# Patient Record
Sex: FEMALE | Race: WHITE | NOT HISPANIC OR LATINO | URBAN - METROPOLITAN AREA
[De-identification: names, ages, dates, MRNs, and addresses within clinical notes are randomized per-mention and may not be internally consistent; named-entity substitution may affect disease eponyms.]

---

## 2017-01-26 ENCOUNTER — PATIENT OUTREACH (OUTPATIENT)
Dept: ADMINISTRATIVE | Facility: HOSPITAL | Age: 62
End: 2017-01-26

## 2017-01-26 NOTE — LETTER
January 27, 2017    Celine Steen  [address] [place] [address]             [facility]  [address]  [facility] [address]  Phone: [phone] Dear Louie JiménezRector:    [facility] is committed to your overall health.  To help you get the most out of each of your visits, we will review your information to make sure you are up to date on all of your recommended tests and/or procedures.  We have Dr. Casa Lepe listed as your primary care provider.     He has found that you may be due for an office visit with him, some labs for your diabetes and cholesterol, your annual diabetic eye and foot exams, pap smear, mammogram, hepatitis C screening, and possibly some immunizations (flu, shingles, pneumonia, and tetanus).     If you have had any of the above done at an outside facility, please let us know so I can update your record.  If you have a copy of these records, please provide a copy for us to scan into your chart.  If not, please provide that provider/facilities contact information so that we may obtain copies from that facility.     Otherwise, please schedule these appointments at your earliest convenience.      If you have any questions or concerns, please don't hesitate to call.    Thank you for letting us care for you,  Keren Vega LPN Clinical Care Coordinator  [facility] [place]  [phone]

## 2017-01-26 NOTE — PROGRESS NOTES
Eye exam due report.    Due for an office visit with him, some labs for your diabetes and cholesterol, your annual diabetic eye and foot exams, pap smear, mammogram, hepatitis C screening, and possibly some immunizations (flu, shingles, pneumonia, and tetanus)    Last ov Roberth 5/2016    2nd attempt

## 2017-02-06 RX ORDER — ESCITALOPRAM OXALATE 20 MG/1
TABLET ORAL
Qty: 90 TABLET | Refills: 0 | Status: SHIPPED | OUTPATIENT
Start: 2017-02-06 | End: 2023-02-17 | Stop reason: SDUPTHER

## 2017-02-08 RX ORDER — METOPROLOL SUCCINATE 100 MG/1
TABLET, EXTENDED RELEASE ORAL
Qty: 90 TABLET | Refills: 0 | Status: SHIPPED | OUTPATIENT
Start: 2017-02-08 | End: 2023-02-17 | Stop reason: SDUPTHER

## 2017-03-02 ENCOUNTER — PATIENT OUTREACH (OUTPATIENT)
Dept: ADMINISTRATIVE | Facility: HOSPITAL | Age: 62
End: 2017-03-02

## 2017-03-02 NOTE — PROGRESS NOTES
Eye exam due report.    Due for an office visit with him, your fasting cholesterol and diabetes labs, your annual diabetic eye and foot exams, pap smear, mammogram, hepatitis C screening, and possibly some immunizations (flu, shingles, pneumonia, and tetanus)    Last ov Roberth 5/2016    Labs and ov scheduled - did you want to add anything?  There is a bnp and urine tox screen in there.  Do you want me to link them?

## 2017-03-08 ENCOUNTER — PATIENT OUTREACH (OUTPATIENT)
Dept: ADMINISTRATIVE | Facility: HOSPITAL | Age: 62
End: 2017-03-08

## 2017-03-08 NOTE — LETTER
March 8, 2017    Celine Steen  [address] [place] [address]             [facility]  [address]  [facility] [address]  Phone: [phone] Dear  Celsa:    [facility] committed to your overall health.  To help you get the most out of each of your visits, we will review your information to make sure you are up to date on all of your recommended tests and/or procedures.      Dr. Casa Lepe has found that you may be due for your annual diabetic eye and foot exams, pap smear, mammogram, and possibly some immunizations (flu, shingles, pneumonia, and tetanus).     If you have had any of the above done at another facility, please bring the records or information with you so that your record at [facility] will be complete.    If you are currently taking medication, please bring it with you to your appointment for review.    If you have any questions or concerns, please don't hesitate to call.    Thank you for letting us care for you,  Keren Vega LPN Clinical Care Coordinator  [facility] [place]  [phone]

## 2017-03-26 RX ORDER — CLOPIDOGREL BISULFATE 75 MG/1
TABLET ORAL
Qty: 90 TABLET | Refills: 0 | OUTPATIENT
Start: 2017-03-26

## 2017-05-24 RX ORDER — SIMVASTATIN 40 MG/1
TABLET, FILM COATED ORAL
Qty: 90 TABLET | Refills: 0 | OUTPATIENT
Start: 2017-05-24

## 2017-05-30 ENCOUNTER — PATIENT OUTREACH (OUTPATIENT)
Dept: ADMINISTRATIVE | Facility: HOSPITAL | Age: 62
End: 2017-05-30

## 2017-05-30 DIAGNOSIS — I10 ESSENTIAL HYPERTENSION: ICD-10-CM

## 2017-05-30 DIAGNOSIS — E11.00 TYPE 2 DIABETES MELLITUS WITH HYPEROSMOLARITY WITHOUT COMA, UNSPECIFIED LONG TERM INSULIN USE STATUS: Primary | ICD-10-CM

## 2017-05-30 DIAGNOSIS — Z11.59 ENCOUNTER FOR HEPATITIS C SCREENING TEST FOR LOW RISK PATIENT: ICD-10-CM

## 2017-05-30 NOTE — PROGRESS NOTES
DM registry, over 1 year.  Called pt to inform:    Due for an office visit with him, your fasting cholesterol and diabetes labs, a urine test for your kidneys, your annual diabetic eye and foot exams, mammogram, hepatitis C screening, and possibly some immunizations (shingles, pneumonia, and tetanus)    Last ov Roberth 5/2016, 5th attempt    Pt scheduled labs and ov.

## 2017-06-27 ENCOUNTER — PATIENT OUTREACH (OUTPATIENT)
Dept: ADMINISTRATIVE | Facility: HOSPITAL | Age: 62
End: 2017-06-27

## 2017-06-27 NOTE — LETTER
June 27, 2017    Celine Steen  [address] [place] [address]             [facility]  [address]  [facility] [address]  Phone: [phone] Dear Mrs. Jiménezhead:    We have tried to reach you unsuccessfully for the past several months.  We care about your health and want to ensure you are receiving the healthcare you need.  Please contact our office so that we can schedule any necessary appointments or testing that are due.    [facility] committed to your overall health.  To help you get the most out of each of your visits, we will review your information to make sure you are up to date on all of your recommended tests and/or procedures.      We have Dr. Casa Lepe listed as your primary care provider.  You have not been in to see him since May of 2016.  If Dr. Lepe is no longer your primary care provider, please contact me so that we may update our records accordingly.      He has found that you may be due for an office visit with him, your fasting cholesterol and diabetes labs, a urine test for your kidneys, your annual diabetic eye and foot exams, mammogram, and possibly some immunizations (shingles, pneumonia, and tetanus).     Medicare does not cover all immunizations to be given in the clinic.  Check your benefits to ensure that you do not need to receive your immunizations at the pharmacy.    If you have had any of the above done at an outside facility, please let us know so I can update your record.  If you have a copy of these records, please provide a copy for us to scan into your chart.  If not, please provide that provider/facilities contact information so that we may obtain copies from that facility.     Otherwise, please schedule these appointments at your earliest convenience.     If you have any questions or concerns, please don't hesitate to call.    Thank you for letting us care for you,  Keren Vega LPN Clinical Care Coordinator  [facility]  [place]  [phone]

## 2017-06-27 NOTE — PROGRESS NOTES
DM registry, over 1 year.  Called pt to inform:    Due for an office visit with him, your fasting cholesterol and diabetes labs, a urine test for your kidneys, your annual diabetic eye and foot exams, mammogram, and possibly some immunizations (shingles, pneumonia, and tetanus)    6th attempt, Amy's letter sent.

## 2018-04-12 ENCOUNTER — CLINICAL SUPPORT (OUTPATIENT)
Dept: SMOKING CESSATION | Facility: CLINIC | Age: 63
End: 2018-04-12
Payer: COMMERCIAL

## 2018-04-12 DIAGNOSIS — F17.200 NICOTINE DEPENDENCE: Primary | ICD-10-CM

## 2018-04-12 PROCEDURE — 99404 PREV MED CNSL INDIV APPRX 60: CPT | Mod: S$GLB,,,

## 2018-04-12 PROCEDURE — 99999 PR PBB SHADOW E&M-EST. PATIENT-LVL II: CPT | Mod: PBBFAC,,,

## 2018-04-12 RX ORDER — IBUPROFEN 200 MG
1 TABLET ORAL DAILY
Qty: 14 PATCH | Refills: 0 | Status: SHIPPED | OUTPATIENT
Start: 2018-04-12 | End: 2018-04-25 | Stop reason: SDUPTHER

## 2018-04-18 ENCOUNTER — CLINICAL SUPPORT (OUTPATIENT)
Dept: SMOKING CESSATION | Facility: CLINIC | Age: 63
End: 2018-04-18
Payer: COMMERCIAL

## 2018-04-18 DIAGNOSIS — F17.200 NICOTINE DEPENDENCE: Primary | ICD-10-CM

## 2018-04-18 PROCEDURE — 90853 GROUP PSYCHOTHERAPY: CPT | Mod: S$GLB,,,

## 2018-04-18 PROCEDURE — 99999 PR PBB SHADOW E&M-EST. PATIENT-LVL I: CPT | Mod: PBBFAC,,,

## 2018-04-18 NOTE — Clinical Note
Patient is smoking about 8-10 cigarettes, she has made some progress with reduction. The patient remains on the prescribed tobacco cessation medication regimen of 21 mg nicotine patch QD without any negative side effects at this time. The patient will continue to come to the clinic for additional support and encouragement.

## 2018-04-19 NOTE — PROGRESS NOTES
Site: [facility]  Date:  4/18/2018  Clinical Status of Patient: Outpatient   Length of Service and Code: 60 minutes - 30056   Number in Attendance: 5  Group Activities/Focus of Group:  orientation, client introductions, completion of TCRS (Tobacco Cessation Rating Scale) learned addiction model, cues/triggers, personal reasons for quitting, medications, goals, quit date    Target symptoms:  withdrawal and medication side effects             The following were rated moderate (3) to severe (4) on TCRS:       Moderate 3: Desire or crave, anxious - discussed withdrawal and habit      Severe 4:   none  Patient's Response to Intervention: Patient is smoking about 8-10 cigarettes, she has made some progress with reduction. The patient remains on the prescribed tobacco cessation medication regimen of 21 mg nicotine patch QD without any negative side effects at this time. The patient will continue to come to the clinic for additional support and encouragement.   Progress Toward Goals and Other Mental Status Changes: The patient denies any abnormal behavioral or mental changes at this time.   Diagnosis: Z72.0  Plan: The patient will continue with group therapy sessions and medication monitoring by CTTS. Prescribed medication management will be by physician.   Return to Clinic: 1 week

## 2018-04-25 ENCOUNTER — CLINICAL SUPPORT (OUTPATIENT)
Dept: SMOKING CESSATION | Facility: CLINIC | Age: 63
End: 2018-04-25
Payer: COMMERCIAL

## 2018-04-25 DIAGNOSIS — F17.200 NICOTINE DEPENDENCE: Primary | ICD-10-CM

## 2018-04-25 PROCEDURE — 99999 PR PBB SHADOW E&M-EST. PATIENT-LVL I: CPT | Mod: PBBFAC,,,

## 2018-04-25 PROCEDURE — 90853 GROUP PSYCHOTHERAPY: CPT | Mod: S$GLB,,,

## 2018-04-25 RX ORDER — IBUPROFEN 200 MG
1 TABLET ORAL DAILY
Qty: 14 PATCH | Refills: 0 | Status: SHIPPED | OUTPATIENT
Start: 2018-04-25 | End: 2018-05-10 | Stop reason: SDUPTHER

## 2018-04-25 NOTE — Clinical Note
Patient is smoking about 15-16 cigarettes per day. She states that she got some bad news about a friends health and she smoked more at that time. She is determined to get back on track and remain focused on her goal. She will be committed to consistent of the nicotine patch and she has set a reduction goal for this week. The patient remains on the prescribed tobacco cessation medication regimen of 21 mg nicotine patch QD without any negative side effects at this time. The patient will continue to come to the clinic for additional support and encouragement.

## 2018-04-26 NOTE — PROGRESS NOTES
Smoking Cessation Group Session #2    Site: [facility]  Date:  4/25/2018  Clinical Status of Patient: Outpatient   Length of Service and Code: 60 minutes - 27798   Number in Attendance: 4  Group Activities/Focus of Group:  Sharing last weeks challenges, triggers, and coping activities to remain quit and/ or keep making progress toward cessation, completion of TCRS (Tobacco Cessation Rating Scale) learned addiction model, personal reasons for quitting, medications, goals, quit date.    Specific session focus: completion of TCRS (Tobacco Cessation Rating Scale) reviewed strategies, cues, and triggers. Introduced the negative impact of tobacco on health, the health advantages of discontinuing the use of tobacco, time line improved health changes after a quit, withdrawal issues to expect from nicotine and habit, and ways to achieve the goal of a quit.  Target symptoms:  withdrawal and medication side effects             The following were rated moderate (3) to severe (4) on TCRS:       Moderate 3: desire or crave - discussed withdrawal and habit      Severe 4:   None   Patient's Response to Intervention: Patient is smoking about 15-16 cigarettes per day. She states that she got some bad news about a friends health and she smoked more at that time. She is determined to get back on track and remain focused on her goal. She will be committed to consistent of the nicotine patch and she has set a reduction goal for this week. The patient remains on the prescribed tobacco cessation medication regimen of 21 mg nicotine patch QD without any negative side effects at this time. The patient will continue to come to the clinic for additional support and encouragement.   Progress Toward Goals and Other Mental Status Changes: The patient denies any abnormal behavioral or mental changes at this time.   Diagnosis: Z72.0  Plan: The patient will continue with group therapy sessions and medication monitoring by CTTS. Prescribed medication  management will be by physician.   Return to Clinic: 1 week    Quit Date:    Planned Quit Date:

## 2018-05-03 ENCOUNTER — CLINICAL SUPPORT (OUTPATIENT)
Dept: SMOKING CESSATION | Facility: CLINIC | Age: 63
End: 2018-05-03
Payer: COMMERCIAL

## 2018-05-03 DIAGNOSIS — F17.200 NICOTINE DEPENDENCE: Primary | ICD-10-CM

## 2018-05-03 PROCEDURE — 99999 PR PBB SHADOW E&M-EST. PATIENT-LVL I: CPT | Mod: PBBFAC,,,

## 2018-05-03 PROCEDURE — 99407 BEHAV CHNG SMOKING > 10 MIN: CPT | Mod: S$GLB,,,

## 2018-05-09 ENCOUNTER — CLINICAL SUPPORT (OUTPATIENT)
Dept: SMOKING CESSATION | Facility: CLINIC | Age: 63
End: 2018-05-09
Payer: COMMERCIAL

## 2018-05-09 DIAGNOSIS — F17.200 NICOTINE DEPENDENCE: Primary | ICD-10-CM

## 2018-05-09 PROCEDURE — 90853 GROUP PSYCHOTHERAPY: CPT | Mod: S$GLB,,,

## 2018-05-09 PROCEDURE — 99999 PR PBB SHADOW E&M-EST. PATIENT-LVL I: CPT | Mod: PBBFAC,,,

## 2018-05-09 NOTE — Clinical Note
Patient is smoking about 9 cigarettes per day. She states that she got down to 5 per day and with stress she slipped back to 9. The patient remains on the prescribed tobacco cessation medication regimen of 21 mg nicotine patch QD without any negative side effects at this time. The patient will continue to come to the clinic for additional support and encouragement.

## 2018-05-10 RX ORDER — IBUPROFEN 200 MG
1 TABLET ORAL DAILY
Qty: 14 PATCH | Refills: 0 | Status: SHIPPED | OUTPATIENT
Start: 2018-05-10 | End: 2018-05-24 | Stop reason: SDUPTHER

## 2018-05-10 NOTE — PROGRESS NOTES
Smoking Cessation Group Session #4    Site: [facility]  Date:  5/9/2018  Clinical Status of Patient: Outpatient   Length of Service and Code: 60 minutes - 01994   Number in Attendance: 3  Group Activities/Focus of Group:  Sharing last weeks challenges, triggers, and coping activities to remain quit and/ or keep making progress toward cessation, completion of TCRS (Tobacco Cessation Rating Scale) learned addiction model, personal reasons for quitting, medications, goals, quit date.    Specific session focus: completion of TCRS (Tobacco Cessation Rating Scale) reviewed strategies, habitual behavior, stress, and high risk situations. Introduced stress with addition interventions, SOLVE, relaxation with interventions, nutrition, exercise, weight gain, and the importance of rewarding oneself for accomplishments toward becoming tobacco free. Open discussion of all items with interventions.     Target symptoms:  withdrawal and medication side effects             The following were rated moderate (3) to severe (4) on TCRS:       Moderate 3: irritable, desire or crave, anxious, restless - discussed withdrawal and habit      Severe 4:   None   Patient's Response to Intervention: Patient is smoking about 9 cigarettes per day. She states that she got down to 5 per day and with stress she slipped back to 9. The patient remains on the prescribed tobacco cessation medication regimen of 21 mg nicotine patch QD without any negative side effects at this time. The patient will continue to come to the clinic for additional support and encouragement.   Progress Toward Goals and Other Mental Status Changes: The patient denies any abnormal behavioral or mental changes at this time.   Diagnosis: Z72.0  Plan:The patient will continue with group therapy sessions and medication monitoring by CTTS. Prescribed medication management will be by physician.   Return to Clinic: 1 week    Quit Date:    Planned Quit Date: discussed but not yet set

## 2018-05-23 ENCOUNTER — CLINICAL SUPPORT (OUTPATIENT)
Dept: SMOKING CESSATION | Facility: CLINIC | Age: 63
End: 2018-05-23
Payer: COMMERCIAL

## 2018-05-23 DIAGNOSIS — F17.200 NICOTINE DEPENDENCE: Primary | ICD-10-CM

## 2018-05-23 PROCEDURE — 99999 PR PBB SHADOW E&M-EST. PATIENT-LVL I: CPT | Mod: PBBFAC,,,

## 2018-05-23 PROCEDURE — 90853 GROUP PSYCHOTHERAPY: CPT | Mod: S$GLB,,,

## 2018-05-24 RX ORDER — VARENICLINE TARTRATE 0.5 (11)-1
KIT ORAL
Qty: 1 PACKAGE | Refills: 0 | Status: SHIPPED | OUTPATIENT
Start: 2018-05-24 | End: 2018-06-21 | Stop reason: DRUGHIGH

## 2018-05-24 RX ORDER — IBUPROFEN 200 MG
1 TABLET ORAL DAILY
Qty: 14 PATCH | Refills: 0 | Status: SHIPPED | OUTPATIENT
Start: 2018-05-24 | End: 2018-06-07 | Stop reason: SDUPTHER

## 2018-05-24 NOTE — PROGRESS NOTES
Smoking Cessation Group Session #6    Site: [facility]  Date:  5/23/2018  Clinical Status of Patient: Outpatient   Length of Service and Code: 60 minutes - 08063   Number in Attendance: 3  Group Activities/Focus of Group:  Sharing last weeks challenges, triggers, and coping activities to remain quit and/ or keep making progress toward cessation, completion of TCRS (Tobacco Cessation Rating Scale) learned addiction model, personal reasons for quitting, medications, goals, quit date.    Specific session focus: completion of TCRS (Tobacco Cessation Rating Scale) reviewed strategies, cues, triggers, high risk situations, lapses, relapses, diet, exercise, stress, relaxation, sleep, habitual behavior, and life style changes.  Target symptoms:  withdrawal and medication side effects             The following were rated moderate (3) to severe (4) on TCRS:       Moderate 3: irritable, desire or crave, depressed mood, anxious, restless, difficulty concentrating - discussed withdrawal and habit      Severe 4:   none  Patient's Response to Intervention: Patient states that she had a bad week because she had to go see her brother in [place] that is dying with cancer. She is back to smoking 20 cigarettes per day. She took the nicotine patch off  for a few days. She states that this has been a real struggle for her and that she will try to refocus on this quit effort. She has requested Rx for Chantix to further aid her quit. We discussed the importance of a recommit to the quit and the importance of a daily consistent effort despite the more difficult days. The patient remains on the prescribed tobacco cessation medication regimen of 21 mg nicotine patch QD without any negative side effects at this time. I will look into coverage for Chantix. The patient will continue to come to the clinic for additional support and encouragement.      Progress Toward Goals and Other Mental Status Changes: The patient denies any abnormal behavioral or  mental changes at this time.   Diagnosis: Z72.0  Plan: The patient will continue with group therapy sessions and medication monitoring by CTTS. Prescribed medication management will be by physician.   Return to Clinic: 1 week    Quit Date:    Planned Quit Date: discussed but not yet set

## 2018-05-30 ENCOUNTER — CLINICAL SUPPORT (OUTPATIENT)
Dept: SMOKING CESSATION | Facility: CLINIC | Age: 63
End: 2018-05-30
Payer: COMMERCIAL

## 2018-05-30 DIAGNOSIS — F17.200 NICOTINE DEPENDENCE: Primary | ICD-10-CM

## 2018-05-30 PROCEDURE — 90853 GROUP PSYCHOTHERAPY: CPT | Mod: S$GLB,,,

## 2018-05-30 PROCEDURE — 99999 PR PBB SHADOW E&M-EST. PATIENT-LVL I: CPT | Mod: PBBFAC,,,

## 2018-05-30 NOTE — Clinical Note
Patient states that she is doing better this week. She is smoking a few puffs at a time on 1-2 cigarette throughout the day. She started Chantix starter pack and she is tolerating well thus far without any negative side effects. She has continued use of the 21 mg nicotine patch QD without any negative side effects at this time. I have recommended she finish the patches that she has and then plan to stop the nicotine patch. She will try a dry run quit day this week on 6/1/2018. The patient will continue to come to the clinic for additional support and encouragement.

## 2018-05-31 NOTE — PROGRESS NOTES
Site: [facility]  Date:  5/30/2018  Clinical Status of Patient: Outpatient   Length of Service and Code: 60 minutes - 11680   Number in Attendance: 4  Group Activities/Focus of Group:  orientation, client introductions, completion of TCRS (Tobacco Cessation Rating Scale) learned addiction model, cues/triggers, personal reasons for quitting, medications, goals, quit date    Target symptoms:  withdrawal and medication side effects             The following were rated moderate (3) to severe (4) on TCRS:       Moderate 3: increased appetite, anxious - discussed withdrawal and habit       Severe 4:   none  Patient's Response to Intervention: Patient states that she is doing better this week. She is smoking a few puffs at a time on 1-2 cigarette throughout the day. She started Chantix starter pack and she is tolerating well thus far without any negative side effects. She has continued use of the 21 mg nicotine patch QD without any negative side effects at this time. I have recommended she finish the patches that she has and then plan to stop the nicotine patch. She will try a dry run quit day this week on 6/1/2018. The patient will continue to come to the clinic for additional support and encouragement.     Progress Toward Goals and Other Mental Status Changes: The patient denies any abnormal behavioral or mental changes at this time.   Diagnosis: Z72.0  Plan: The patient will continue with group therapy sessions and medication monitoring by CTTS. Prescribed medication management will be by physician.     Return to Clinic: 1 week

## 2018-06-06 ENCOUNTER — CLINICAL SUPPORT (OUTPATIENT)
Dept: SMOKING CESSATION | Facility: CLINIC | Age: 63
End: 2018-06-06
Payer: COMMERCIAL

## 2018-06-06 DIAGNOSIS — F17.200 NICOTINE DEPENDENCE: Primary | ICD-10-CM

## 2018-06-06 PROCEDURE — 99999 PR PBB SHADOW E&M-EST. PATIENT-LVL I: CPT | Mod: PBBFAC,,,

## 2018-06-06 PROCEDURE — 90853 GROUP PSYCHOTHERAPY: CPT | Mod: S$GLB,,,

## 2018-06-06 NOTE — Clinical Note
Patient reports that she is tobacco free since 5/31/2018. She states that she's surprised herself, and she is doing well at this time. Patient is using strategies previously discussed. The patient remains on the prescribed tobacco cessation medication regimen of 1 mg Chantix BID & 21 mg nicotine patch QD, without any negative side effects at this time. We discussed discontinuing the use of patch and allowing Chantix to continue working at it's therapeutic level. She would like to stay on patches 21 mg QD for 2 more weeks. The patient will continue to come to the clinic for additional support and encouragement.

## 2018-06-07 RX ORDER — IBUPROFEN 200 MG
1 TABLET ORAL DAILY
Qty: 14 PATCH | Refills: 0 | Status: SHIPPED | OUTPATIENT
Start: 2018-06-07 | End: 2018-07-12 | Stop reason: DRUGHIGH

## 2018-06-07 NOTE — PROGRESS NOTES
Smoking Cessation Group Session #2    Site: [facility]  Date:  6/6/2018  Clinical Status of Patient: Outpatient   Length of Service and Code: 60 minutes - 38884   Number in Attendance: 3  Group Activities/Focus of Group:  Sharing last weeks challenges, triggers, and coping activities to remain quit and/ or keep making progress toward cessation, completion of TCRS (Tobacco Cessation Rating Scale) learned addiction model, personal reasons for quitting, medications, goals, quit date.    Specific session focus: completion of TCRS (Tobacco Cessation Rating Scale) reviewed strategies, cues, and triggers. Introduced the negative impact of tobacco on health, the health advantages of discontinuing the use of tobacco, time line improved health changes after a quit, withdrawal issues to expect from nicotine and habit, and ways to achieve the goal of a quit.  Target symptoms:  withdrawal and medication side effects             The following were rated moderate (3) to severe (4) on TCRS:       Moderate 3: depressed mood -discussed habit & withdrawal      Severe 4:   Increased appetite - discussed habit & withdrawal   Patient's Response to Intervention: Patient reports that she is tobacco free since 5/31/2018. She states that she's surprised herself, and she is doing well at this time. Patient is using strategies previously discussed. The patient remains on the prescribed tobacco cessation medication regimen of 1 mg Chantix BID & 21 mg nicotine patch QD, without any negative side effects at this time. We discussed discontinuing the use of patch and allowing Chantix to continue working at it's therapeutic level. She would like to stay on patches 21 mg QD for 2 more weeks. The patient will continue to come to the clinic for additional support and encouragement.     Progress Toward Goals and Other Mental Status Changes: The patient denies any abnormal behavioral or mental changes at this time.   Diagnosis: Z72.0  Plan: The patient will  continue with group therapy sessions and medication monitoring by CTTS. Prescribed medication management will be by physician.   Return to Clinic: 1 week    Quit Date:  5/31/2018  Planned Quit Date:

## 2018-06-13 ENCOUNTER — CLINICAL SUPPORT (OUTPATIENT)
Dept: SMOKING CESSATION | Facility: CLINIC | Age: 63
End: 2018-06-13
Payer: COMMERCIAL

## 2018-06-13 DIAGNOSIS — F17.200 NICOTINE DEPENDENCE: Primary | ICD-10-CM

## 2018-06-13 PROCEDURE — 99999 PR PBB SHADOW E&M-EST. PATIENT-LVL I: CPT | Mod: PBBFAC,,,

## 2018-06-13 PROCEDURE — 90853 GROUP PSYCHOTHERAPY: CPT | Mod: S$GLB,,,

## 2018-06-13 NOTE — Clinical Note
Patient remains tobacco free at this time. She is doing well and she states that her urges and desire have become less intense and less frequent. Patient is using strategies previously discussed. The patient remains on the prescribed tobacco cessation medication regimen of 1 mg Chantix BID & 21 mg nicotine patch QD, without any negative side effects at this time. I have encouraged patient to discontinue use of the nicotine patch, but she feels secure with the patch for now and she is tolerating well without negative side effects at this time.

## 2018-06-14 NOTE — PROGRESS NOTES
Smoking Cessation Group Session #3    Site: [facility]  Date:  6/13/2018  Clinical Status of Patient: Outpatient   Length of Service and Code: 60 minutes - 43757   Number in Attendance: 6  Group Activities/Focus of Group:  Sharing last weeks challenges, triggers, and coping activities to remain quit and/ or keep making progress toward cessation, completion of TCRS (Tobacco Cessation Rating Scale) learned addiction model, personal reasons for quitting, medications, goals, quit date.    Specific session focus: completion of TCRS (Tobacco Cessation Rating Scale) reviewed strategies, controlling environment, cues, triggers, new goals set. Introduced high risk situations with preparation interventions, caffeine similarities with withdrawal issues of habit and nicotine, Alcohol, Understanding urges, cravings, stress and relaxation. Open discussion with intervention discussion.  Target symptoms:  withdrawal and medication side effects             The following were rated moderate (3) to severe (4) on TCRS:       Moderate 3: increased appetite - discussed withdrawal & habit       Severe 4:   none  Patient's Response to Intervention: Patient remains tobacco free at this time. She is doing well and she states that her urges and desire have become less intense and less frequent. Patient is using strategies previously discussed. The patient remains on the prescribed tobacco cessation medication regimen of 1 mg Chantix BID & 21 mg nicotine patch QD, without any negative side effects at this time. I have encouraged patient to discontinue use of the nicotine patch, but she feels secure with the patch for now and she is tolerating well without negative side effects at this time.   Progress Toward Goals and Other Mental Status Changes: The patient denies any abnormal behavioral or mental changes at this time.     Diagnosis: Z72.0  Plan: The patient will continue with group therapy sessions and medication monitoring by CTTS. Prescribed  medication management will be by physician.   Return to Clinic: 1 week    Quit Date:  5/31/2018  Planned Quit Date:

## 2018-06-18 ENCOUNTER — TELEPHONE (OUTPATIENT)
Dept: SMOKING CESSATION | Facility: CLINIC | Age: 63
End: 2018-06-18

## 2018-06-18 ENCOUNTER — CLINICAL SUPPORT (OUTPATIENT)
Dept: SMOKING CESSATION | Facility: CLINIC | Age: 63
End: 2018-06-18
Payer: COMMERCIAL

## 2018-06-18 DIAGNOSIS — F17.200 NICOTINE DEPENDENCE: Primary | ICD-10-CM

## 2018-06-18 PROCEDURE — 99407 BEHAV CHNG SMOKING > 10 MIN: CPT | Mod: S$GLB,,,

## 2018-06-18 NOTE — PROGRESS NOTES
Successful contact with patient regarding tobacco cessation quit #1. Pt states, she remains tobacco free since 5/31/2018; down from 1-2 packs/day. Pt commended for the accomplishment, thus far. Pt also states, strategies implemented in group therapy, along with prescribed chantix helped her to maintain being tobacco free. Pt denies any abnormal behavior or mental changes at this time. Pt provided with her current and next available benefit status.  Will follow up with her progress in 3-4 months.

## 2018-06-20 ENCOUNTER — CLINICAL SUPPORT (OUTPATIENT)
Dept: SMOKING CESSATION | Facility: CLINIC | Age: 63
End: 2018-06-20
Payer: COMMERCIAL

## 2018-06-20 DIAGNOSIS — F17.200 NICOTINE DEPENDENCE: Primary | ICD-10-CM

## 2018-06-20 PROCEDURE — 90853 GROUP PSYCHOTHERAPY: CPT | Mod: S$GLB,,,

## 2018-06-20 PROCEDURE — 99999 PR PBB SHADOW E&M-EST. PATIENT-LVL I: CPT | Mod: PBBFAC,,,

## 2018-06-20 NOTE — Clinical Note
Patient is doing well at this time and she is managing withdrawal symptoms well. She remains tobacco free. The patient remains on the prescribed tobacco cessation medication regimen of 1 mg Chantix BID & 21 mg nicotine patch, without any negative side effects at this time. Discussed discontinue of 21 mg patch or weaning to 14 mg QD. The patient will continue to come to the clinic for additional support and encouragement.

## 2018-06-21 RX ORDER — VARENICLINE TARTRATE 1 MG/1
1 TABLET, FILM COATED ORAL 2 TIMES DAILY
Qty: 60 TABLET | Refills: 0 | Status: SHIPPED | OUTPATIENT
Start: 2018-06-21 | End: 2022-11-08

## 2018-06-21 NOTE — PROGRESS NOTES
Smoking Cessation Group Session #4    Site: [facility]  Date:  6/20/2018  Clinical Status of Patient: Outpatient   Length of Service and Code: 60 minutes - 09061   Number in Attendance: 3  Group Activities/Focus of Group:  Sharing last weeks challenges, triggers, and coping activities to remain quit and/ or keep making progress toward cessation, completion of TCRS (Tobacco Cessation Rating Scale) learned addiction model, personal reasons for quitting, medications, goals, quit date.    Specific session focus: completion of TCRS (Tobacco Cessation Rating Scale) reviewed strategies, habitual behavior, stress, and high risk situations. Introduced stress with addition interventions, SOLVE, relaxation with interventions, nutrition, exercise, weight gain, and the importance of rewarding oneself for accomplishments toward becoming tobacco free. Open discussion of all items with interventions.   Target symptoms:  withdrawal and medication side effects             The following were rated moderate (3) to severe (4) on TCRS:       Moderate 3: none     Severe 4:   none  Patient's Response to Intervention: Patient is doing well at this time and she is managing withdrawal symptoms well. She remains tobacco free. The patient remains on the prescribed tobacco cessation medication regimen of 1 mg Chantix BID & 21 mg nicotine patch, without any negative side effects at this time. The patient will continue to come to the clinic for additional support and encouragement.     Progress Toward Goals and Other Mental Status Changes: The patient denies any abnormal behavioral or mental changes at this time.     Diagnosis: Z72.0  Plan:The patient will continue with group therapy sessions and medication monitoring by CTTS. Prescribed medication management will be by physician.   Return to Clinic: 1 week    Quit Date:  5/31/2018  Planned Quit Date:

## 2018-07-11 ENCOUNTER — CLINICAL SUPPORT (OUTPATIENT)
Dept: SMOKING CESSATION | Facility: CLINIC | Age: 63
End: 2018-07-11
Payer: COMMERCIAL

## 2018-07-11 DIAGNOSIS — F17.200 NICOTINE DEPENDENCE: Primary | ICD-10-CM

## 2018-07-11 PROCEDURE — 90853 GROUP PSYCHOTHERAPY: CPT | Mod: S$GLB,,,

## 2018-07-11 PROCEDURE — 99999 PR PBB SHADOW E&M-EST. PATIENT-LVL I: CPT | Mod: PBBFAC,,,

## 2018-07-12 RX ORDER — IBUPROFEN 200 MG
1 TABLET ORAL DAILY
Qty: 14 PATCH | Refills: 0 | Status: SHIPPED | OUTPATIENT
Start: 2018-07-12 | End: 2022-11-08

## 2018-07-12 NOTE — PROGRESS NOTES
Smoking Cessation Group Session #5    Site: [facility]  Date:  7/11/2018  Clinical Status of Patient: Outpatient   Length of Service and Code: 60 minutes - 34932   Number in Attendance: 3  Group Activities/Focus of Group:  Sharing last weeks challenges, triggers, and coping activities to remain quit and/ or keep making progress toward cessation, completion of TCRS (Tobacco Cessation Rating Scale) learned addiction model, personal reasons for quitting, medications, goals, quit date.    Specific session focus: completion of TCRS (Tobacco Cessation Rating Scale) reviewed strategies, habitual behavior, high risks situations, understanding urges and cravings, stress and relaxation with open discussion and additional interventions, Introduced lapses, relapses, understanding them and analyzing the situation of a lapse, conflict issues that may be linked to a lapse.   Target symptoms:  withdrawal and medication side effects             The following were rated moderate (3) to severe (4) on TCRS:       Moderate 3: Increased appetite     Severe 4:   none  Patient's Response to Intervention: Patient remains tobacco free at this time. Patient is using strategies previously discussed.  The patient remains on the prescribed tobacco cessation medication regimen of 1 mg Chantix BID without any negative side effects at this time.    She dis try not using the nicotine patch for a few days as I had recommended and she believes that the patch does aid in keeping urges to minimum. I will wean the dose to 14 mg and encouraged her to wean off completely. The patient will continue to come to the clinic for additional support and encouragement.     Progress Toward Goals and Other Mental Status Changes: The patient denies any abnormal behavioral or mental changes at this time.   Diagnosis: Z72.0  Plan: The patient will continue with group therapy sessions and medication monitoring by CTTS. Prescribed medication management will be by physician.    Return to Clinic: 1 week    Quit Date: 5/31/2018   Planned Quit Date:

## 2018-10-25 ENCOUNTER — TELEPHONE (OUTPATIENT)
Dept: SMOKING CESSATION | Facility: CLINIC | Age: 63
End: 2018-10-25

## 2018-11-02 ENCOUNTER — TELEPHONE (OUTPATIENT)
Dept: SMOKING CESSATION | Facility: CLINIC | Age: 63
End: 2018-11-02

## 2018-11-15 ENCOUNTER — TELEPHONE (OUTPATIENT)
Dept: SMOKING CESSATION | Facility: CLINIC | Age: 63
End: 2018-11-15

## 2019-05-30 ENCOUNTER — CLINICAL SUPPORT (OUTPATIENT)
Dept: URGENT CARE | Facility: CLINIC | Age: 64
End: 2019-05-30
Payer: MEDICARE

## 2019-05-30 VITALS
WEIGHT: 191 LBS | RESPIRATION RATE: 18 BRPM | BODY MASS INDEX: 33.84 KG/M2 | HEIGHT: 63 IN | SYSTOLIC BLOOD PRESSURE: 165 MMHG | OXYGEN SATURATION: 95 % | TEMPERATURE: 99 F | DIASTOLIC BLOOD PRESSURE: 87 MMHG | HEART RATE: 89 BPM

## 2019-05-30 DIAGNOSIS — J06.9 UPPER RESPIRATORY TRACT INFECTION, UNSPECIFIED TYPE: ICD-10-CM

## 2019-05-30 DIAGNOSIS — J04.0 LARYNGITIS: ICD-10-CM

## 2019-05-30 DIAGNOSIS — J02.9 PHARYNGITIS, UNSPECIFIED ETIOLOGY: Primary | ICD-10-CM

## 2019-05-30 DIAGNOSIS — E11.9 TYPE 2 DIABETES MELLITUS WITHOUT COMPLICATION, UNSPECIFIED WHETHER LONG TERM INSULIN USE: ICD-10-CM

## 2019-05-30 LAB — GLUCOSE SERPL-MCNC: 194 MG/DL (ref 70–110)

## 2019-05-30 PROCEDURE — 99214 PR OFFICE/OUTPT VISIT, EST, LEVL IV, 30-39 MIN: ICD-10-PCS | Mod: 25,S$GLB,, | Performed by: NURSE PRACTITIONER

## 2019-05-30 PROCEDURE — 99214 OFFICE O/P EST MOD 30 MIN: CPT | Mod: 25,S$GLB,, | Performed by: NURSE PRACTITIONER

## 2019-05-30 PROCEDURE — 82962 POCT GLUCOSE, HAND-HELD DEVICE: ICD-10-PCS | Mod: ,,, | Performed by: NURSE PRACTITIONER

## 2019-05-30 PROCEDURE — 82962 GLUCOSE BLOOD TEST: CPT | Mod: ,,, | Performed by: NURSE PRACTITIONER

## 2019-05-30 RX ORDER — PREDNISONE 20 MG/1
20 TABLET ORAL 2 TIMES DAILY
Qty: 6 TABLET | Refills: 0 | Status: SHIPPED | OUTPATIENT
Start: 2019-05-30 | End: 2019-06-02

## 2019-05-30 RX ORDER — FLUTICASONE PROPIONATE 50 MCG
2 SPRAY, SUSPENSION (ML) NASAL 2 TIMES DAILY
Qty: 1 BOTTLE | Refills: 0 | Status: SHIPPED | OUTPATIENT
Start: 2019-05-30 | End: 2022-02-13

## 2019-05-30 RX ORDER — CODEINE PHOSPHATE AND GUAIFENESIN 10; 100 MG/5ML; MG/5ML
5 SOLUTION ORAL 3 TIMES DAILY PRN
Qty: 120 ML | Refills: 0 | Status: SHIPPED | OUTPATIENT
Start: 2019-05-30 | End: 2019-06-09

## 2019-05-30 RX ORDER — CETIRIZINE HYDROCHLORIDE 10 MG/1
10 TABLET ORAL DAILY
Qty: 30 TABLET | Refills: 2 | Status: ON HOLD | OUTPATIENT
Start: 2019-05-30 | End: 2023-04-08 | Stop reason: HOSPADM

## 2019-05-30 NOTE — PATIENT INSTRUCTIONS
Symptomatic treatment:    Alternate Tylenol and Ibuprofen every 3 hrs for fever, pain and inflammation. Avoid Nsaids if you are pregnant or have advanced kidney disease.     salt water gargles to soothe throat from post nasal drip  Honey/lemon water or warm tea to soothe throat from post nasal drip  Cepachol helps to soothe the discomfort in throat from post nasal drip    Cold-eeze helps to reduce the duration of URI symptoms if taken early  Elderberry to reduce duration of viral URI symptoms    Nasal saline spray reduces inflammation and dryness  Warm face compresses/hot showers as often as you can to open sinuses and allow to drain.   Flonase OTC or Nasacort OTC to help decrease inflammation in nasal turbinates and allow sinuses to drain    Vicks vapor rub at night  Simple foods like chicken noodle soup help provide hydration and nutrition    Delsym helps with coughing at night    Zantac will help if there is reflux from the post nasal drip and helpful to take at night    Zyrtec/Claritin during the day and Benadryl at night may help if allergy component concurrently with URI    Rest as much as you can    Your symptoms will likely last 5-7 days, maybe longer depending on how it affects your body.  You are contagious 5-7, so minimize contact with others to reduce the spread to others and stay home from work or school as we discussed. Dehydration is preventable but is one of the main reasons why you will feel so badly. Drink pedialyte, gatorade or propel. Stay hydrated.  Antibiotics are not needed unless a complication(such as Otitis Media, Bacterial sinus infection or pneumonia) develops. Taking antibiotics for Flu/Cold is not supported by evidence-based medicine and can expose you to unnecessary side effects of the medication, such as anaphylaxis, yeast infection and leads to antibiotic resistance.   If you experience any:  Chest pain, shortness of breath, wheezing or difficulty breathing,  Severe headache, face,  neck or ear pain,  New rash,  Fever over 101.5º F (38.6 C) for more than three days,  Confusion, behavior change or seizure,  Severe weakness or dizziness, please go to the ER immediately for further testing.             Laryngitis    Laryngitis is a swelling of the tissues around the vocal cords. Symptoms include a hoarse (scratchy) voice. The voice may be lost completely. It may be caused by a viral illness, such as a head or chest cold. It may also be due to overuse and strain of the voice. Smoking, drinking alcohol, acid reflux, allergies, or inhaling harsh chemicals may also lead to symptoms. This condition will usually resolve in 1-2 weeks.  Home care  · Rest your voice until it recovers. Talk as little as possible. If your symptoms are severe, rest at home for a day or so.  · Breathing cool steam from a humidifier/vaporizer or in a steamy shower may be helpful.  · Drink plenty of fluids to stay well hydrated.  · Do not smoke  Follow-up care  Follow up with your healthcare provider or this facility if you have not improved after one week.  When to seek medical advice  Contact your healthcare provider for any of the following:  · Severe pain with swallowing  · Trouble opening mouth  · Neck swelling, neck pain, or trouble moving neck  · Noisy breathing or trouble breathing  · Fever of 100.4°F (38.ºC) or higher, or as directed by your healthcare provider  · Drooling  · Symptoms do not resolve in 2 weeks  Date Last Reviewed: 4/26/2015  © 4077-2249 [employer]. [address] [place] [address], [place] [address]. All rights reserved. This information is not intended as a substitute for professional medical care. Always follow your healthcare professional's instructions.        Self-Care for Sore Throats    Sore throats happen for many reasons, such as colds, allergies, and infections caused by viruses or bacteria. In any case, your throat becomes red and sore. Your goal for self-care is to reduce your  discomfort while giving your throat a chance to heal.  Moisten and soothe your throat  Tips include the following:  · Try a sip of water first thing after waking up.  · Keep your throat moist by drinking 6 or more glasses of clear liquids every day.  · Run a cool-air humidifier in your room overnight.  · Avoid cigarette smoke.   · Suck on throat lozenges, cough drops, hard candy, ice chips, or frozen fruit-juice bars. Use the sugar-free versions if your diet or medical condition requires them.  Gargle to ease irritation  Gargling every hour or 2 can ease irritation. Try gargling with 1 of these solutions:  · 1/4 teaspoon of salt in 1/2 cup of warm water  · An over-the-counter anesthetic gargle  Use medicine for more relief  Over-the-counter medicine can reduce sore throat symptoms. Ask your pharmacist if you have questions about which medicine to use:  · Ease pain with anesthetic sprays. Aspirin or an aspirin substitute also helps. Remember, never give aspirin to anyone 18 or younger, or if you are already taking blood thinners.   · For sore throats caused by allergies, try antihistamines to block the allergic reaction.  · Remember: unless a sore throat is caused by a bacterial infection, antibiotics wont help you.  Prevent future sore throats  Prevention tips include the following:  · Stop smoking or reduce contact with secondhand smoke. Smoke irritates the tender throat lining.  · Limit contact with pets and with allergy-causing substances, such as pollen and mold.  · When youre around someone with a sore throat or cold, wash your hands often to keep viruses or bacteria from spreading.  · Dont strain your vocal cords.  Call your healthcare provider  Contact your healthcare provider if you have:  · A temperature over 101°F (38.3°C)  · White spots on the throat  · Great difficulty swallowing  · Trouble breathing  · A skin rash  · Recent exposure to someone else with strep bacteria  · Severe hoarseness and swollen  glands in the neck or jaw   Date Last Reviewed: 8/1/2016  © 8904-8252 [employer]. [address] [place] [address], [place] [address]. All rights reserved. This information is not intended as a substitute for professional medical care. Always follow your healthcare professional's instructions.

## 2019-05-30 NOTE — PROGRESS NOTES
"Subjective:       Patient ID: Celine Steen is a 64 y.o. female.    Vitals:  height is 5' 3" (1.6 m) and weight is 86.6 kg (191 lb). Her oral temperature is 98.7 °F (37.1 °C). Her blood pressure is 165/87 (abnormal) and her pulse is 89. Her respiration is 18 and oxygen saturation is 95%.     Chief Complaint: Sore Throat    Sore Throat    This is a new problem. The current episode started yesterday. Pertinent negatives include no congestion, coughing, diarrhea, headaches, shortness of breath or vomiting. She has tried NSAIDs for the symptoms.       Constitution: Negative for chills, fatigue and fever.   HENT: Positive for sore throat and voice change. Negative for congestion.    Neck: Negative for painful lymph nodes.   Cardiovascular: Negative for chest pain and leg swelling.   Eyes: Negative for double vision and blurred vision.   Respiratory: Negative for cough and shortness of breath.    Gastrointestinal: Negative for nausea, vomiting and diarrhea.   Genitourinary: Negative for dysuria, frequency, urgency and history of kidney stones.   Musculoskeletal: Negative for joint pain, joint swelling, muscle cramps and muscle ache.   Skin: Negative for color change, pale, rash and bruising.   Allergic/Immunologic: Negative for seasonal allergies.   Neurological: Negative for dizziness, history of vertigo, light-headedness, passing out and headaches.   Hematologic/Lymphatic: Negative for swollen lymph nodes.   Psychiatric/Behavioral: Negative for nervous/anxious, sleep disturbance and depression. The patient is not nervous/anxious.        Objective:      Physical Exam   Constitutional: She is oriented to person, place, and time. She appears well-developed and well-nourished. She is cooperative.  Non-toxic appearance. She does not appear ill. No distress.   HENT:   Head: Normocephalic and atraumatic.   Right Ear: Hearing, tympanic membrane, external ear and ear canal normal.   Left Ear: Hearing, tympanic membrane, " external ear and ear canal normal.   Nose: Nose normal. No mucosal edema, rhinorrhea or nasal deformity. No epistaxis. Right sinus exhibits no maxillary sinus tenderness and no frontal sinus tenderness. Left sinus exhibits no maxillary sinus tenderness and no frontal sinus tenderness.   Mouth/Throat: Uvula is midline and mucous membranes are normal. No trismus in the jaw. Normal dentition. No uvula swelling. Posterior oropharyngeal erythema present.   Eyes: Conjunctivae and lids are normal. Right eye exhibits no discharge. Left eye exhibits no discharge. No scleral icterus.   Sclera clear bilat   Neck: Trachea normal, normal range of motion, full passive range of motion without pain and phonation normal. Neck supple.   Cardiovascular: Normal rate, regular rhythm, normal heart sounds, intact distal pulses and normal pulses.   Pulmonary/Chest: Effort normal and breath sounds normal. No respiratory distress.   Abdominal: Soft. Normal appearance and bowel sounds are normal. She exhibits no distension, no pulsatile midline mass and no mass. There is no tenderness.   Musculoskeletal: Normal range of motion. She exhibits no edema or deformity.   Neurological: She is alert and oriented to person, place, and time. She exhibits normal muscle tone. Coordination normal.   Skin: Skin is warm, dry and intact. She is not diaphoretic. No pallor.   Psychiatric: She has a normal mood and affect. Her speech is normal and behavior is normal. Judgment and thought content normal. Cognition and memory are normal.   Nursing note and vitals reviewed.      Assessment:       1. Pharyngitis, unspecified etiology    2. Type 2 diabetes mellitus without complication, unspecified whether long term insulin use    3. Laryngitis    4. Upper respiratory tract infection, unspecified type        Plan:         Pharyngitis, unspecified etiology    Type 2 diabetes mellitus without complication, unspecified whether long term insulin use  -     POCT Glucose,  Hand-Held Device    Laryngitis    Upper respiratory tract infection, unspecified type    Other orders  -     guaifenesin-codeine 100-10 mg/5 ml (CHERATUSSIN AC)  mg/5 mL syrup; Take 5 mLs by mouth 3 (three) times daily as needed for Cough.  Dispense: 120 mL; Refill: 0  -     fluticasone propionate (FLONASE) 50 mcg/actuation nasal spray; 2 sprays (100 mcg total) by Each Nare route 2 (two) times daily.  Dispense: 1 Bottle; Refill: 0  -     cetirizine (ZYRTEC) 10 MG tablet; Take 1 tablet (10 mg total) by mouth once daily.  Dispense: 30 tablet; Refill: 2  -     predniSONE (DELTASONE) 20 MG tablet; Take 1 tablet (20 mg total) by mouth 2 (two) times daily. for 3 days  Dispense: 6 tablet; Refill: 0

## 2019-06-05 ENCOUNTER — CLINICAL SUPPORT (OUTPATIENT)
Dept: URGENT CARE | Facility: CLINIC | Age: 64
End: 2019-06-05
Payer: MEDICARE

## 2019-06-05 VITALS
BODY MASS INDEX: 33.66 KG/M2 | OXYGEN SATURATION: 96 % | HEART RATE: 78 BPM | HEIGHT: 63 IN | RESPIRATION RATE: 14 BRPM | TEMPERATURE: 98 F | SYSTOLIC BLOOD PRESSURE: 109 MMHG | DIASTOLIC BLOOD PRESSURE: 61 MMHG | WEIGHT: 190 LBS

## 2019-06-05 DIAGNOSIS — J40 BRONCHITIS: Primary | ICD-10-CM

## 2019-06-05 DIAGNOSIS — J02.9 PHARYNGITIS, UNSPECIFIED ETIOLOGY: ICD-10-CM

## 2019-06-05 PROCEDURE — 99214 OFFICE O/P EST MOD 30 MIN: CPT | Mod: S$GLB,,, | Performed by: NURSE PRACTITIONER

## 2019-06-05 PROCEDURE — 99214 PR OFFICE/OUTPT VISIT, EST, LEVL IV, 30-39 MIN: ICD-10-PCS | Mod: S$GLB,,, | Performed by: NURSE PRACTITIONER

## 2019-06-05 RX ORDER — CODEINE PHOSPHATE AND GUAIFENESIN 10; 100 MG/5ML; MG/5ML
5 SOLUTION ORAL EVERY 6 HOURS PRN
Qty: 120 ML | Refills: 0 | Status: SHIPPED | OUTPATIENT
Start: 2019-06-05 | End: 2019-06-08

## 2019-06-05 RX ORDER — AZITHROMYCIN 250 MG/1
TABLET, FILM COATED ORAL
Qty: 6 TABLET | Refills: 0 | Status: SHIPPED | OUTPATIENT
Start: 2019-06-05 | End: 2022-02-13

## 2019-06-05 NOTE — PATIENT INSTRUCTIONS
What Is Acute Bronchitis?  Acute bronchitis is when the airways in your lungs (bronchial tubes) become red and swollen (inflamed). It is usually caused by a viral infection. But it can also occur because of a bacteria or allergen. Symptoms include a cough that produces yellow or greenish mucus and can last for days or sometimes weeks.  Inside healthy lungs    Air travels in and out of the lungs through the airways. The linings of these airways produce sticky mucus. This mucus traps particles that enter the lungs. Tiny structures called cilia then sweep the particles out of the airways.     Healthy airway: Airways are normally open. Air moves in and out easily.      Healthy cilia: Tiny, hairlike cilia sweep mucus and particles up and out of the airways.   Lungs with bronchitis  Bronchitis often occurs with a cold or the flu virus. The airways become inflamed (red and swollen). There is a deep hacking cough from the extra mucus. Other symptoms may include:  · Wheezing  · Chest discomfort  · Shortness of breath  · Mild fever  A second infection, this time due to bacteria, may then occur. And airways irritated by allergens or smoke are more likely to get infected.        Inflamed airway: Inflammation and extra mucus narrow the airway, causing shortness of breath.      Impaired cilia: Extra mucus impairs cilia, causing congestion and wheezing. Smoking makes the problem worse.   Making a diagnosis  A physical exam, health history, and certain tests help your healthcare provider make the diagnosis.  Health history  Your healthcare provider will ask you about your symptoms.  The exam  Your provider listens to your chest for signs of congestion. He or she may also check your ears, nose, and throat.  Possible tests  · A sputum test for bacteria. This requires a sample of mucus from your lungs.  · A nasal or throat swab. This tests to see if you have a bacterial infection.  · A chest X-ray. This is done if your healthcare  provider thinks you have pneumonia.  · Tests to check for an underlying condition. Other tests may be done to check for things such as allergies, asthma, or COPD (chronic obstructive pulmonary disease). You may need to see a specialist for more lung function testing.  Treating a cough  The main treatment for bronchitis is easing symptoms. Avoiding smoke, allergens, and other things that trigger coughing can often help. If the infection is bacterial, you may be given antibiotics. During the illness, it's important to get plenty of sleep. To ease symptoms:  · Dont smoke. Also avoid secondhand smoke.  · Use a humidifier. Or try breathing in steam from a hot shower. This may help loosen mucus.  · Drink a lot of water and juice. They can soothe the throat and may help thin mucus.  · Sit up or use extra pillows when in bed. This helps to lessen coughing and congestion.  · Ask your provider about using medicine. Ask about using cough medicine, pain and fever medicine, or a decongestant.  Antibiotics  Most cases of bronchitis are caused by cold or flu viruses. They dont need antibiotics to treat them, even if your mucus is thick and green or yellow. Antibiotics dont treat viral illness and antibiotics have not been shown to have any benefit in cases of acute bronchitis. Taking antibiotics when they are not needed increases your risk of getting an infection later that is antibiotic-resistant. Antibiotics can also cause severe cases of diarrhea that require other antibiotics to treat.  It is important that you accept your healthcare provider's opinion to not use antibiotics. Your provider will prescribe antibiotics if the infection is caused by bacteria. If they are prescribed:  · Take all of the medicine. Take the medicine until it is used up, even if symptoms have improved. If you dont, the bronchitis may come back.  · Take the medicines as directed. For instance, some medicines should be taken with food.  · Ask about  side effects. Ask your provider or pharmacist what side effects are common, and what to do about them.  Follow-up care  You should see your provider again in 2 to 3 weeks. By this time, symptoms should have improved. An infection that lasts longer may mean you have a more serious problem.  Prevention  · Avoid tobacco smoke. If you smoke, quit. Stay away from smoky places. Ask friends and family not to smoke around you, or in your home or car.  · Get checked for allergies.  · Ask your provider about getting a yearly flu shot. Also ask about pneumococcal or pneumonia shots.  · Wash your hands often. This helps reduce the chance of picking up viruses that cause colds and flu.  Call your healthcare provider if:  · Symptoms worsen, or you have new symptoms  · Breathing problems worsen or  become severe  · Symptoms dont get better within a week, or within 3 days of taking antibiotics   Date Last Reviewed: 2/1/2017  © 8672-4143 [employer]. [address] [place] [address]. All rights reserved. This information is not intended as a substitute for professional medical care. Always follow your healthcare professional's instructions.        Self-Care for Sore Throats    Sore throats happen for many reasons, such as colds, allergies, and infections caused by viruses or bacteria. In any case, your throat becomes red and sore. Your goal for self-care is to reduce your discomfort while giving your throat a chance to heal.  Moisten and soothe your throat  Tips include the following:  · Try a sip of water first thing after waking up.  · Keep your throat moist by drinking 6 or more glasses of clear liquids every day.  · Run a cool-air humidifier in your room overnight.  · Avoid cigarette smoke.   · Suck on throat lozenges, cough drops, hard candy, ice chips, or frozen fruit-juice bars. Use the sugar-free versions if your diet or medical condition requires them.  Gargle to ease irritation  Gargling every hour  or 2 can ease irritation. Try gargling with 1 of these solutions:  · 1/4 teaspoon of salt in 1/2 cup of warm water  · An over-the-counter anesthetic gargle  Use medicine for more relief  Over-the-counter medicine can reduce sore throat symptoms. Ask your pharmacist if you have questions about which medicine to use:  · Ease pain with anesthetic sprays. Aspirin or an aspirin substitute also helps. Remember, never give aspirin to anyone 18 or younger, or if you are already taking blood thinners.   · For sore throats caused by allergies, try antihistamines to block the allergic reaction.  · Remember: unless a sore throat is caused by a bacterial infection, antibiotics wont help you.  Prevent future sore throats  Prevention tips include the following:  · Stop smoking or reduce contact with secondhand smoke. Smoke irritates the tender throat lining.  · Limit contact with pets and with allergy-causing substances, such as pollen and mold.  · When youre around someone with a sore throat or cold, wash your hands often to keep viruses or bacteria from spreading.  · Dont strain your vocal cords.  Call your healthcare provider  Contact your healthcare provider if you have:  · A temperature over 101°F (38.3°C)  · White spots on the throat  · Great difficulty swallowing  · Trouble breathing  · A skin rash  · Recent exposure to someone else with strep bacteria  · Severe hoarseness and swollen glands in the neck or jaw   Date Last Reviewed: 8/1/2016  © 9874-2765 [employer]. [address] [place] [address], [place] [address]. All rights reserved. This information is not intended as a substitute for professional medical care. Always follow your healthcare professional's instructions.

## 2019-06-05 NOTE — PROGRESS NOTES
"Subjective:       Patient ID: Celine Steen is a 64 y.o. female.    Vitals:  height is 5' 3" (1.6 m) and weight is 86.2 kg (190 lb). Her oral temperature is 98 °F (36.7 °C). Her blood pressure is 109/61 and her pulse is 78. Her respiration is 14 and oxygen saturation is 96%.     Chief Complaint: Follow-up    Was here on 5/30/19 for pharyngitis and URI. Pt was given steroids and cough meds and states shes not any better. She is still experience cough with yellow sputum, sore throat and fatigue. She denies f/c/n/v. She denies cp or sob.       Constitution: Positive for fatigue. Negative for chills, sweating and fever.   HENT: Positive for sore throat. Negative for ear pain, congestion, sinus pain, sinus pressure and voice change.    Neck: Negative for painful lymph nodes.   Eyes: Negative for eye redness.   Respiratory: Positive for cough and sputum production. Negative for chest tightness, bloody sputum, COPD, shortness of breath, stridor, wheezing and asthma.    Gastrointestinal: Negative for nausea and vomiting.   Musculoskeletal: Negative for muscle ache.   Skin: Negative for rash.   Allergic/Immunologic: Negative for seasonal allergies and asthma.   Hematologic/Lymphatic: Negative for swollen lymph nodes.       Objective:      Physical Exam   Constitutional: She is oriented to person, place, and time. She appears well-developed and well-nourished. She is cooperative.  Non-toxic appearance. She does not appear ill. No distress.   HENT:   Head: Normocephalic and atraumatic.   Right Ear: Hearing, tympanic membrane, external ear and ear canal normal.   Left Ear: Hearing, tympanic membrane, external ear and ear canal normal.   Nose: Nose normal. No mucosal edema, rhinorrhea or nasal deformity. No epistaxis. Right sinus exhibits no maxillary sinus tenderness and no frontal sinus tenderness. Left sinus exhibits no maxillary sinus tenderness and no frontal sinus tenderness.   Mouth/Throat: Uvula is midline and mucous " membranes are normal. No trismus in the jaw. Normal dentition. No uvula swelling. Posterior oropharyngeal erythema present.   Eyes: Conjunctivae and lids are normal. No scleral icterus.   Sclera clear bilat   Neck: Trachea normal, full passive range of motion without pain and phonation normal. Neck supple.   Cardiovascular: Normal rate, regular rhythm, normal heart sounds, intact distal pulses and normal pulses.   Pulmonary/Chest: Effort normal and breath sounds normal. No stridor. No respiratory distress. She has no wheezes. She has no rales. She exhibits no tenderness.   +wet cough   Abdominal: Soft. Normal appearance and bowel sounds are normal. She exhibits no distension. There is no tenderness.   Musculoskeletal: Normal range of motion. She exhibits no edema or deformity.   Neurological: She is alert and oriented to person, place, and time. She exhibits normal muscle tone. Coordination normal.   Skin: Skin is warm, dry and intact. She is not diaphoretic. No pallor.   Psychiatric: She has a normal mood and affect. Her speech is normal and behavior is normal. Judgment and thought content normal. Cognition and memory are normal.   Nursing note and vitals reviewed.      Assessment:       1. Bronchitis    2. Pharyngitis, unspecified etiology        Plan:         Bronchitis    Pharyngitis, unspecified etiology    Other orders  -     azithromycin (Z-MAG) 250 MG tablet; Take 2 tablets by mouth on day 1; Take 1 tablet by mouth on days 2-5  Dispense: 6 tablet; Refill: 0  -     guaifenesin-codeine 100-10 mg/5 ml (CHERATUSSIN AC)  mg/5 mL syrup; Take 5 mLs by mouth every 6 (six) hours as needed.  Dispense: 120 mL; Refill: 0

## 2019-06-18 ENCOUNTER — CLINICAL SUPPORT (OUTPATIENT)
Dept: SMOKING CESSATION | Facility: CLINIC | Age: 64
End: 2019-06-18
Payer: COMMERCIAL

## 2019-06-18 DIAGNOSIS — F17.200 NICOTINE DEPENDENCE: Primary | ICD-10-CM

## 2019-06-18 PROCEDURE — 99407 BEHAV CHNG SMOKING > 10 MIN: CPT | Mod: S$GLB,,, | Performed by: INTERNAL MEDICINE

## 2019-06-18 PROCEDURE — 99407 PR TOBACCO USE CESSATION INTENSIVE >10 MINUTES: ICD-10-PCS | Mod: S$GLB,,, | Performed by: INTERNAL MEDICINE

## 2019-06-18 NOTE — PROGRESS NOTES
Spoke with patient today in regard to smoking cessation progress for 6/12 month telephone follow up, she states tobacco free as of 5/31/2018. Commended patient on the accomplishment. Patient states using the prescribed tobacco cessation medication of Chantix to help aid in her quit. Informed patient of benefit period and contact information if any further help or support is needed. Will resolve episode and complete smart form for Quit attempt #1.

## 2020-02-13 DIAGNOSIS — Z13.820 SCREENING FOR OSTEOPOROSIS: ICD-10-CM

## 2020-02-13 DIAGNOSIS — Z78.0 MENOPAUSE: ICD-10-CM

## 2020-02-13 DIAGNOSIS — Z12.31 VISIT FOR SCREENING MAMMOGRAM: Primary | ICD-10-CM

## 2020-02-14 ENCOUNTER — HOSPITAL ENCOUNTER (OUTPATIENT)
Dept: RADIOLOGY | Facility: HOSPITAL | Age: 65
Discharge: HOME | End: 2020-02-14
Attending: NURSE PRACTITIONER
Payer: MEDICARE

## 2020-02-14 VITALS — BODY MASS INDEX: 33.68 KG/M2 | HEIGHT: 63 IN | WEIGHT: 190.06 LBS

## 2020-02-14 DIAGNOSIS — Z13.820 SCREENING FOR OSTEOPOROSIS: ICD-10-CM

## 2020-02-14 DIAGNOSIS — Z12.31 VISIT FOR SCREENING MAMMOGRAM: ICD-10-CM

## 2020-02-14 DIAGNOSIS — Z78.0 MENOPAUSE: ICD-10-CM

## 2020-02-14 PROCEDURE — 77067 SCR MAMMO BI INCL CAD: CPT | Mod: TC,PO

## 2020-02-14 PROCEDURE — 77080 DXA BONE DENSITY AXIAL: CPT | Mod: TC,PO

## 2020-04-21 ENCOUNTER — TELEPHONE (OUTPATIENT)
Dept: SMOKING CESSATION | Facility: CLINIC | Age: 65
End: 2020-04-21

## 2020-04-21 NOTE — TELEPHONE ENCOUNTER
I called patient for her tobacco cessation intake appointment, I was unable to reach her at this time. I did leave my name and contact information for a return call.

## 2020-04-28 ENCOUNTER — TELEPHONE (OUTPATIENT)
Dept: SMOKING CESSATION | Facility: CLINIC | Age: 65
End: 2020-04-28

## 2020-04-28 NOTE — TELEPHONE ENCOUNTER
I called to offer the patient an opportunity to reschedule her tobacco cessation intake appointment. However, I was unable to reach her today, I did leave my name and number for a return bonny.

## 2021-04-01 ENCOUNTER — IMMUNIZATION (OUTPATIENT)
Dept: PRIMARY CARE CLINIC | Facility: CLINIC | Age: 66
End: 2021-04-01
Payer: MEDICARE

## 2021-04-01 DIAGNOSIS — Z23 NEED FOR VACCINATION: Primary | ICD-10-CM

## 2021-04-01 PROCEDURE — 91300 COVID-19, MRNA, LNP-S, PF, 30 MCG/0.3 ML DOSE VACCINE: CPT | Mod: S$GLB,,, | Performed by: FAMILY MEDICINE

## 2021-04-01 PROCEDURE — 0001A COVID-19, MRNA, LNP-S, PF, 30 MCG/0.3 ML DOSE VACCINE: ICD-10-PCS | Mod: CV19,S$GLB,, | Performed by: FAMILY MEDICINE

## 2021-04-01 PROCEDURE — 0001A COVID-19, MRNA, LNP-S, PF, 30 MCG/0.3 ML DOSE VACCINE: CPT | Mod: CV19,S$GLB,, | Performed by: FAMILY MEDICINE

## 2021-04-01 PROCEDURE — 91300 COVID-19, MRNA, LNP-S, PF, 30 MCG/0.3 ML DOSE VACCINE: ICD-10-PCS | Mod: S$GLB,,, | Performed by: FAMILY MEDICINE

## 2021-04-22 ENCOUNTER — IMMUNIZATION (OUTPATIENT)
Dept: PRIMARY CARE CLINIC | Facility: CLINIC | Age: 66
End: 2021-04-22
Payer: MEDICARE

## 2021-04-22 DIAGNOSIS — Z23 NEED FOR VACCINATION: Primary | ICD-10-CM

## 2021-04-22 PROCEDURE — 91300 COVID-19, MRNA, LNP-S, PF, 30 MCG/0.3 ML DOSE VACCINE: ICD-10-PCS | Mod: S$GLB,,, | Performed by: PHYSICIAN ASSISTANT

## 2021-04-22 PROCEDURE — 0002A COVID-19, MRNA, LNP-S, PF, 30 MCG/0.3 ML DOSE VACCINE: ICD-10-PCS | Mod: CV19,S$GLB,, | Performed by: PHYSICIAN ASSISTANT

## 2021-04-22 PROCEDURE — 0002A COVID-19, MRNA, LNP-S, PF, 30 MCG/0.3 ML DOSE VACCINE: CPT | Mod: CV19,S$GLB,, | Performed by: PHYSICIAN ASSISTANT

## 2021-04-22 PROCEDURE — 91300 COVID-19, MRNA, LNP-S, PF, 30 MCG/0.3 ML DOSE VACCINE: CPT | Mod: S$GLB,,, | Performed by: PHYSICIAN ASSISTANT

## 2021-08-25 ENCOUNTER — LAB VISIT (OUTPATIENT)
Dept: LAB | Facility: HOSPITAL | Age: 66
End: 2021-08-25
Payer: MEDICARE

## 2021-08-25 DIAGNOSIS — E78.2 MIXED HYPERLIPIDEMIA: ICD-10-CM

## 2021-08-25 DIAGNOSIS — Z00.01 ENCOUNTER FOR GENERAL ADULT MEDICAL EXAMINATION WITH ABNORMAL FINDINGS: ICD-10-CM

## 2021-08-25 DIAGNOSIS — E11.8 DIABETIC COMPLICATION: ICD-10-CM

## 2021-08-25 DIAGNOSIS — I10 ESSENTIAL HYPERTENSION, MALIGNANT: Primary | ICD-10-CM

## 2021-08-25 LAB
25(OH)D3+25(OH)D2 SERPL-MCNC: 28 NG/ML (ref 30–96)
ALBUMIN SERPL BCP-MCNC: 3.5 G/DL (ref 3.5–5.2)
ALP SERPL-CCNC: 24 U/L (ref 55–135)
ALT SERPL W/O P-5'-P-CCNC: 16 U/L (ref 10–44)
ANION GAP SERPL CALC-SCNC: 12 MMOL/L (ref 8–16)
AST SERPL-CCNC: 18 U/L (ref 10–40)
BASOPHILS # BLD AUTO: 0.05 K/UL (ref 0–0.2)
BASOPHILS NFR BLD: 0.6 % (ref 0–1.9)
BILIRUB SERPL-MCNC: 0.3 MG/DL (ref 0.1–1)
BILIRUB UR QL STRIP: NEGATIVE
BUN SERPL-MCNC: 26 MG/DL (ref 8–23)
CALCIUM SERPL-MCNC: 9.7 MG/DL (ref 8.7–10.5)
CHLORIDE SERPL-SCNC: 100 MMOL/L (ref 95–110)
CHOLEST SERPL-MCNC: 134 MG/DL (ref 120–199)
CHOLEST/HDLC SERPL: 4.2 {RATIO} (ref 2–5)
CLARITY UR REFRACT.AUTO: CLEAR
CO2 SERPL-SCNC: 23 MMOL/L (ref 23–29)
COLOR UR AUTO: YELLOW
CREAT SERPL-MCNC: 0.8 MG/DL (ref 0.5–1.4)
DIFFERENTIAL METHOD: ABNORMAL
EOSINOPHIL # BLD AUTO: 0.3 K/UL (ref 0–0.5)
EOSINOPHIL NFR BLD: 3.1 % (ref 0–8)
ERYTHROCYTE [DISTWIDTH] IN BLOOD BY AUTOMATED COUNT: 14.1 % (ref 11.5–14.5)
EST. GFR  (AFRICAN AMERICAN): >60 ML/MIN/1.73 M^2
EST. GFR  (NON AFRICAN AMERICAN): >60 ML/MIN/1.73 M^2
ESTIMATED AVG GLUCOSE: 134 MG/DL (ref 68–131)
GLUCOSE SERPL-MCNC: 131 MG/DL (ref 70–110)
GLUCOSE UR QL STRIP: NEGATIVE
HBA1C MFR BLD: 6.3 % (ref 4–5.6)
HCT VFR BLD AUTO: 37.9 % (ref 37–48.5)
HDLC SERPL-MCNC: 32 MG/DL (ref 40–75)
HDLC SERPL: 23.9 % (ref 20–50)
HGB BLD-MCNC: 11.7 G/DL (ref 12–16)
HGB UR QL STRIP: NEGATIVE
IMM GRANULOCYTES # BLD AUTO: 0.03 K/UL (ref 0–0.04)
IMM GRANULOCYTES NFR BLD AUTO: 0.3 % (ref 0–0.5)
KETONES UR QL STRIP: NEGATIVE
LDLC SERPL CALC-MCNC: 74.8 MG/DL (ref 63–159)
LEUKOCYTE ESTERASE UR QL STRIP: NEGATIVE
LYMPHOCYTES # BLD AUTO: 2.7 K/UL (ref 1–4.8)
LYMPHOCYTES NFR BLD: 30.9 % (ref 18–48)
MCH RBC QN AUTO: 27.2 PG (ref 27–31)
MCHC RBC AUTO-ENTMCNC: 30.9 G/DL (ref 32–36)
MCV RBC AUTO: 88 FL (ref 82–98)
MONOCYTES # BLD AUTO: 1.3 K/UL (ref 0.3–1)
MONOCYTES NFR BLD: 14.7 % (ref 4–15)
NEUTROPHILS # BLD AUTO: 4.5 K/UL (ref 1.8–7.7)
NEUTROPHILS NFR BLD: 50.4 % (ref 38–73)
NITRITE UR QL STRIP: NEGATIVE
NONHDLC SERPL-MCNC: 102 MG/DL
NRBC BLD-RTO: 0 /100 WBC
PH UR STRIP: 7 [PH] (ref 5–8)
PLATELET # BLD AUTO: 335 K/UL (ref 150–450)
PMV BLD AUTO: 10.5 FL (ref 9.2–12.9)
POTASSIUM SERPL-SCNC: 4.9 MMOL/L (ref 3.5–5.1)
PROT SERPL-MCNC: 7.1 G/DL (ref 6–8.4)
PROT UR QL STRIP: NEGATIVE
RBC # BLD AUTO: 4.3 M/UL (ref 4–5.4)
SODIUM SERPL-SCNC: 135 MMOL/L (ref 136–145)
SP GR UR STRIP: 1.01 (ref 1–1.03)
TRIGL SERPL-MCNC: 136 MG/DL (ref 30–150)
TSH SERPL DL<=0.005 MIU/L-ACNC: 1.54 UIU/ML (ref 0.4–4)
URN SPEC COLLECT METH UR: NORMAL
WBC # BLD AUTO: 8.84 K/UL (ref 3.9–12.7)

## 2021-08-25 PROCEDURE — 83036 HEMOGLOBIN GLYCOSYLATED A1C: CPT | Performed by: NURSE PRACTITIONER

## 2021-08-25 PROCEDURE — 84443 ASSAY THYROID STIM HORMONE: CPT | Performed by: NURSE PRACTITIONER

## 2021-08-25 PROCEDURE — 80061 LIPID PANEL: CPT | Performed by: NURSE PRACTITIONER

## 2021-08-25 PROCEDURE — 36415 COLL VENOUS BLD VENIPUNCTURE: CPT | Mod: PO | Performed by: NURSE PRACTITIONER

## 2021-08-25 PROCEDURE — 82306 VITAMIN D 25 HYDROXY: CPT | Performed by: NURSE PRACTITIONER

## 2021-08-25 PROCEDURE — 81003 URINALYSIS AUTO W/O SCOPE: CPT | Performed by: NURSE PRACTITIONER

## 2021-08-25 PROCEDURE — 80053 COMPREHEN METABOLIC PANEL: CPT | Performed by: NURSE PRACTITIONER

## 2021-08-25 PROCEDURE — 85025 COMPLETE CBC W/AUTO DIFF WBC: CPT | Performed by: NURSE PRACTITIONER

## 2022-01-11 ENCOUNTER — IMMUNIZATION (OUTPATIENT)
Dept: PRIMARY CARE CLINIC | Facility: CLINIC | Age: 67
End: 2022-01-11
Payer: MEDICARE

## 2022-01-11 DIAGNOSIS — Z23 NEED FOR VACCINATION: Primary | ICD-10-CM

## 2022-01-11 PROCEDURE — 0004A COVID-19, MRNA, LNP-S, PF, 30 MCG/0.3 ML DOSE VACCINE: CPT | Mod: S$GLB,,, | Performed by: FAMILY MEDICINE

## 2022-01-11 PROCEDURE — 0004A COVID-19, MRNA, LNP-S, PF, 30 MCG/0.3 ML DOSE VACCINE: ICD-10-PCS | Mod: S$GLB,,, | Performed by: FAMILY MEDICINE

## 2022-01-11 PROCEDURE — 91300 COVID-19, MRNA, LNP-S, PF, 30 MCG/0.3 ML DOSE VACCINE: CPT | Mod: S$GLB,,, | Performed by: FAMILY MEDICINE

## 2022-01-11 PROCEDURE — 91300 COVID-19, MRNA, LNP-S, PF, 30 MCG/0.3 ML DOSE VACCINE: ICD-10-PCS | Mod: S$GLB,,, | Performed by: FAMILY MEDICINE

## 2022-01-27 ENCOUNTER — OFFICE VISIT (OUTPATIENT)
Dept: URGENT CARE | Facility: CLINIC | Age: 67
End: 2022-01-27
Payer: MEDICARE

## 2022-01-27 VITALS
BODY MASS INDEX: 32.02 KG/M2 | WEIGHT: 174 LBS | TEMPERATURE: 98 F | HEIGHT: 62 IN | DIASTOLIC BLOOD PRESSURE: 84 MMHG | OXYGEN SATURATION: 96 % | RESPIRATION RATE: 16 BRPM | SYSTOLIC BLOOD PRESSURE: 147 MMHG | HEART RATE: 79 BPM

## 2022-01-27 DIAGNOSIS — U07.1 COVID-19 VIRUS DETECTED: ICD-10-CM

## 2022-01-27 DIAGNOSIS — Z20.822 ENCOUNTER FOR LABORATORY TESTING FOR COVID-19 VIRUS: Primary | ICD-10-CM

## 2022-01-27 LAB
CTP QC/QA: YES
SARS-COV-2 AG RESP QL IA.RAPID: POSITIVE

## 2022-01-27 PROCEDURE — 99213 OFFICE O/P EST LOW 20 MIN: CPT | Mod: CR,S$GLB,CS, | Performed by: NURSE PRACTITIONER

## 2022-01-27 PROCEDURE — 87811 SARS CORONAVIRUS 2 ANTIGEN POCT, MANUAL READ: ICD-10-PCS | Mod: CR,QW,S$GLB, | Performed by: NURSE PRACTITIONER

## 2022-01-27 PROCEDURE — 99213 PR OFFICE/OUTPT VISIT, EST, LEVL III, 20-29 MIN: ICD-10-PCS | Mod: CR,S$GLB,CS, | Performed by: NURSE PRACTITIONER

## 2022-01-27 PROCEDURE — 87811 SARS-COV-2 COVID19 W/OPTIC: CPT | Mod: CR,QW,S$GLB, | Performed by: NURSE PRACTITIONER

## 2022-01-27 NOTE — PROGRESS NOTES
"Subjective:       Patient ID: Celine Steen is a 66 y.o. female.    Vitals:  height is 5' 2" (1.575 m) and weight is 78.9 kg (174 lb). Her temperature is 98 °F (36.7 °C). Her blood pressure is 147/84 (abnormal) and her pulse is 79. Her respiration is 16 and oxygen saturation is 96%.     Chief Complaint: No chief complaint on file.    Rapid test for cruise Saturday.  Denies symptoms        Constitution: Negative for chills, fatigue and fever.   HENT: Negative for congestion and sore throat.    Cardiovascular: Negative for chest pain.   Respiratory: Negative for cough.    Gastrointestinal: Negative for nausea, vomiting and diarrhea.       Objective:      Physical Exam   Constitutional: She is oriented to person, place, and time. She appears well-developed.  Non-toxic appearance. She does not appear ill. No distress.   HENT:   Head: Normocephalic and atraumatic.   Mouth/Throat: Oropharynx is clear and moist.   Eyes: Conjunctivae and EOM are normal.   Cardiovascular: Normal rate, regular rhythm and normal heart sounds.   Pulmonary/Chest: Effort normal and breath sounds normal.   Abdominal: Normal appearance.   Neurological: She is alert and oriented to person, place, and time.   Skin: Skin is warm, dry and not diaphoretic.   Psychiatric: Her behavior is normal.   Nursing note and vitals reviewed.        Assessment:       1. Encounter for laboratory testing for COVID-19 virus    2. COVID-19 virus detected          Plan:         Encounter for laboratory testing for COVID-19 virus  -     SARS Coronavirus 2 Antigen, POCT Manual Read    COVID-19 virus detected                   "

## 2022-02-13 ENCOUNTER — HOSPITAL ENCOUNTER (INPATIENT)
Facility: HOSPITAL | Age: 67
LOS: 2 days | Discharge: HOME | DRG: 291 | End: 2022-02-15
Attending: EMERGENCY MEDICINE | Admitting: INTERNAL MEDICINE
Payer: MEDICARE

## 2022-02-13 DIAGNOSIS — R06.02 SHORTNESS OF BREATH: ICD-10-CM

## 2022-02-13 DIAGNOSIS — I50.9 CONGESTIVE HEART FAILURE, UNSPECIFIED HF CHRONICITY, UNSPECIFIED HEART FAILURE TYPE: Primary | ICD-10-CM

## 2022-02-13 DIAGNOSIS — I50.9 ACUTE EXACERBATION OF CHF (CONGESTIVE HEART FAILURE): ICD-10-CM

## 2022-02-13 PROBLEM — E87.5 HYPERKALEMIA: Status: ACTIVE | Noted: 2022-02-13

## 2022-02-13 PROBLEM — D72.829 LEUKOCYTOSIS: Status: ACTIVE | Noted: 2022-02-13

## 2022-02-13 PROBLEM — E87.4 METABOLIC ACIDOSIS WITH RESPIRATORY ACIDOSIS: Status: ACTIVE | Noted: 2022-02-13

## 2022-02-13 LAB
ALBUMIN SERPL BCP-MCNC: 3.5 G/DL (ref 3.5–5.2)
ALBUMIN SERPL BCP-MCNC: 3.6 G/DL (ref 3.5–5.2)
ALLENS TEST: ABNORMAL
ALP SERPL-CCNC: 50 U/L (ref 55–135)
ALP SERPL-CCNC: 50 U/L (ref 55–135)
ALT SERPL W/O P-5'-P-CCNC: 33 U/L (ref 10–44)
ALT SERPL W/O P-5'-P-CCNC: 37 U/L (ref 10–44)
ANION GAP SERPL CALC-SCNC: 12 MMOL/L (ref 8–16)
ANION GAP SERPL CALC-SCNC: 21 MMOL/L (ref 8–16)
AST SERPL-CCNC: 26 U/L (ref 10–40)
AST SERPL-CCNC: 52 U/L (ref 10–40)
B-OH-BUTYR BLD STRIP-SCNC: 0.1 MMOL/L (ref 0–0.5)
BASOPHILS # BLD AUTO: 0.05 K/UL (ref 0–0.2)
BASOPHILS # BLD AUTO: 0.11 K/UL (ref 0–0.2)
BASOPHILS NFR BLD: 0.3 % (ref 0–1.9)
BASOPHILS NFR BLD: 0.6 % (ref 0–1.9)
BILIRUB SERPL-MCNC: 0.3 MG/DL (ref 0.1–1)
BILIRUB SERPL-MCNC: 0.4 MG/DL (ref 0.1–1)
BNP SERPL-MCNC: 115 PG/ML (ref 0–99)
BUN SERPL-MCNC: 20 MG/DL (ref 8–23)
BUN SERPL-MCNC: 26 MG/DL (ref 8–23)
CALCIUM SERPL-MCNC: 9.8 MG/DL (ref 8.7–10.5)
CALCIUM SERPL-MCNC: 9.9 MG/DL (ref 8.7–10.5)
CHLORIDE SERPL-SCNC: 97 MMOL/L (ref 95–110)
CHLORIDE SERPL-SCNC: 99 MMOL/L (ref 95–110)
CO2 SERPL-SCNC: 15 MMOL/L (ref 23–29)
CO2 SERPL-SCNC: 29 MMOL/L (ref 23–29)
CREAT SERPL-MCNC: 0.8 MG/DL (ref 0.5–1.4)
CREAT SERPL-MCNC: 1.1 MG/DL (ref 0.5–1.4)
DELSYS: ABNORMAL
DIFFERENTIAL METHOD: ABNORMAL
DIFFERENTIAL METHOD: ABNORMAL
EOSINOPHIL # BLD AUTO: 0.1 K/UL (ref 0–0.5)
EOSINOPHIL # BLD AUTO: 0.5 K/UL (ref 0–0.5)
EOSINOPHIL NFR BLD: 0.7 % (ref 0–8)
EOSINOPHIL NFR BLD: 2.5 % (ref 0–8)
EP: 6
ERYTHROCYTE [DISTWIDTH] IN BLOOD BY AUTOMATED COUNT: 13.8 % (ref 11.5–14.5)
ERYTHROCYTE [DISTWIDTH] IN BLOOD BY AUTOMATED COUNT: 14 % (ref 11.5–14.5)
ERYTHROCYTE [SEDIMENTATION RATE] IN BLOOD BY WESTERGREN METHOD: 8 MM/H
EST. GFR  (AFRICAN AMERICAN): 60 ML/MIN/1.73 M^2
EST. GFR  (AFRICAN AMERICAN): >60 ML/MIN/1.73 M^2
EST. GFR  (NON AFRICAN AMERICAN): 52 ML/MIN/1.73 M^2
EST. GFR  (NON AFRICAN AMERICAN): >60 ML/MIN/1.73 M^2
ESTIMATED AVG GLUCOSE: 154 MG/DL (ref 68–131)
FIO2: 100
GLUCOSE SERPL-MCNC: 144 MG/DL (ref 70–110)
GLUCOSE SERPL-MCNC: 267 MG/DL (ref 70–110)
HBA1C MFR BLD: 7 % (ref 4–5.6)
HCO3 UR-SCNC: 24 MMOL/L (ref 24–28)
HCT VFR BLD AUTO: 39.1 % (ref 37–48.5)
HCT VFR BLD AUTO: 44 % (ref 37–48.5)
HGB BLD-MCNC: 12.3 G/DL (ref 12–16)
HGB BLD-MCNC: 13.5 G/DL (ref 12–16)
IMM GRANULOCYTES # BLD AUTO: 0.07 K/UL (ref 0–0.04)
IMM GRANULOCYTES # BLD AUTO: 0.19 K/UL (ref 0–0.04)
IMM GRANULOCYTES NFR BLD AUTO: 0.4 % (ref 0–0.5)
IMM GRANULOCYTES NFR BLD AUTO: 1.1 % (ref 0–0.5)
IP: 14
LYMPHOCYTES # BLD AUTO: 3 K/UL (ref 1–4.8)
LYMPHOCYTES # BLD AUTO: 4.9 K/UL (ref 1–4.8)
LYMPHOCYTES NFR BLD: 18.6 % (ref 18–48)
LYMPHOCYTES NFR BLD: 27.7 % (ref 18–48)
MAGNESIUM SERPL-MCNC: 1.5 MG/DL (ref 1.6–2.6)
MCH RBC QN AUTO: 27.2 PG (ref 27–31)
MCH RBC QN AUTO: 28 PG (ref 27–31)
MCHC RBC AUTO-ENTMCNC: 30.7 G/DL (ref 32–36)
MCHC RBC AUTO-ENTMCNC: 31.5 G/DL (ref 32–36)
MCV RBC AUTO: 87 FL (ref 82–98)
MCV RBC AUTO: 91 FL (ref 82–98)
MIN VOL: 12.3
MODE: ABNORMAL
MONOCYTES # BLD AUTO: 1.8 K/UL (ref 0.3–1)
MONOCYTES # BLD AUTO: 2 K/UL (ref 0.3–1)
MONOCYTES NFR BLD: 11.5 % (ref 4–15)
MONOCYTES NFR BLD: 11.5 % (ref 4–15)
NEUTROPHILS # BLD AUTO: 10.1 K/UL (ref 1.8–7.7)
NEUTROPHILS # BLD AUTO: 11 K/UL (ref 1.8–7.7)
NEUTROPHILS NFR BLD: 56.6 % (ref 38–73)
NEUTROPHILS NFR BLD: 68.5 % (ref 38–73)
NRBC BLD-RTO: 0 /100 WBC
NRBC BLD-RTO: 0 /100 WBC
PCO2 BLDA: 49.3 MMHG (ref 35–45)
PH SMN: 7.3 [PH] (ref 7.35–7.45)
PHOSPHATE SERPL-MCNC: 4.4 MG/DL (ref 2.7–4.5)
PLATELET # BLD AUTO: 346 K/UL (ref 150–450)
PLATELET # BLD AUTO: 352 K/UL (ref 150–450)
PLATELET BLD QL SMEAR: ABNORMAL
PMV BLD AUTO: 10.5 FL (ref 9.2–12.9)
PMV BLD AUTO: 11 FL (ref 9.2–12.9)
PO2 BLDA: 361 MMHG (ref 80–100)
POC BE: -2 MMOL/L
POC SATURATED O2: 100 % (ref 95–100)
POC TCO2: 26 MMOL/L (ref 23–27)
POCT GLUCOSE: 175 MG/DL (ref 70–110)
POCT GLUCOSE: 181 MG/DL (ref 70–110)
POCT GLUCOSE: 187 MG/DL (ref 70–110)
POCT GLUCOSE: 191 MG/DL (ref 70–110)
POTASSIUM SERPL-SCNC: 4.4 MMOL/L (ref 3.5–5.1)
POTASSIUM SERPL-SCNC: 5.3 MMOL/L (ref 3.5–5.1)
PROCALCITONIN SERPL IA-MCNC: 0.07 NG/ML
PROT SERPL-MCNC: 7.6 G/DL (ref 6–8.4)
PROT SERPL-MCNC: 8.7 G/DL (ref 6–8.4)
RBC # BLD AUTO: 4.52 M/UL (ref 4–5.4)
RBC # BLD AUTO: 4.83 M/UL (ref 4–5.4)
SAMPLE: ABNORMAL
SARS-COV-2 RDRP RESP QL NAA+PROBE: NEGATIVE
SITE: ABNORMAL
SODIUM SERPL-SCNC: 135 MMOL/L (ref 136–145)
SODIUM SERPL-SCNC: 138 MMOL/L (ref 136–145)
SP02: 99
SPONT RATE: 22
TROPONIN I SERPL DL<=0.01 NG/ML-MCNC: 0.01 NG/ML (ref 0–0.03)
WBC # BLD AUTO: 16.04 K/UL (ref 3.9–12.7)
WBC # BLD AUTO: 17.78 K/UL (ref 3.9–12.7)

## 2022-02-13 PROCEDURE — 86803 HEPATITIS C AB TEST: CPT | Performed by: EMERGENCY MEDICINE

## 2022-02-13 PROCEDURE — 84145 PROCALCITONIN (PCT): CPT | Performed by: NURSE PRACTITIONER

## 2022-02-13 PROCEDURE — 80053 COMPREHEN METABOLIC PANEL: CPT | Mod: 91 | Performed by: NURSE PRACTITIONER

## 2022-02-13 PROCEDURE — 27000190 HC CPAP FULL FACE MASK W/VALVE

## 2022-02-13 PROCEDURE — 51702 INSERT TEMP BLADDER CATH: CPT

## 2022-02-13 PROCEDURE — 83880 ASSAY OF NATRIURETIC PEPTIDE: CPT | Performed by: EMERGENCY MEDICINE

## 2022-02-13 PROCEDURE — 83735 ASSAY OF MAGNESIUM: CPT | Performed by: NURSE PRACTITIONER

## 2022-02-13 PROCEDURE — 82803 BLOOD GASES ANY COMBINATION: CPT

## 2022-02-13 PROCEDURE — 94761 N-INVAS EAR/PLS OXIMETRY MLT: CPT

## 2022-02-13 PROCEDURE — 63600175 PHARM REV CODE 636 W HCPCS: Performed by: NURSE PRACTITIONER

## 2022-02-13 PROCEDURE — 96375 TX/PRO/DX INJ NEW DRUG ADDON: CPT

## 2022-02-13 PROCEDURE — 99291 CRITICAL CARE FIRST HOUR: CPT | Mod: 25

## 2022-02-13 PROCEDURE — 94660 CPAP INITIATION&MGMT: CPT

## 2022-02-13 PROCEDURE — 25000003 PHARM REV CODE 250: Performed by: EMERGENCY MEDICINE

## 2022-02-13 PROCEDURE — 27000221 HC OXYGEN, UP TO 24 HOURS

## 2022-02-13 PROCEDURE — 83036 HEMOGLOBIN GLYCOSYLATED A1C: CPT | Performed by: NURSE PRACTITIONER

## 2022-02-13 PROCEDURE — U0002 COVID-19 LAB TEST NON-CDC: HCPCS | Performed by: EMERGENCY MEDICINE

## 2022-02-13 PROCEDURE — 11000001 HC ACUTE MED/SURG PRIVATE ROOM

## 2022-02-13 PROCEDURE — 80053 COMPREHEN METABOLIC PANEL: CPT | Performed by: EMERGENCY MEDICINE

## 2022-02-13 PROCEDURE — 63600175 PHARM REV CODE 636 W HCPCS: Performed by: EMERGENCY MEDICINE

## 2022-02-13 PROCEDURE — 36415 COLL VENOUS BLD VENIPUNCTURE: CPT | Performed by: EMERGENCY MEDICINE

## 2022-02-13 PROCEDURE — 25000003 PHARM REV CODE 250: Performed by: NURSE PRACTITIONER

## 2022-02-13 PROCEDURE — 84100 ASSAY OF PHOSPHORUS: CPT | Performed by: NURSE PRACTITIONER

## 2022-02-13 PROCEDURE — 36415 COLL VENOUS BLD VENIPUNCTURE: CPT | Performed by: NURSE PRACTITIONER

## 2022-02-13 PROCEDURE — 93005 ELECTROCARDIOGRAM TRACING: CPT

## 2022-02-13 PROCEDURE — 96374 THER/PROPH/DIAG INJ IV PUSH: CPT

## 2022-02-13 PROCEDURE — 82010 KETONE BODYS QUAN: CPT | Performed by: NURSE PRACTITIONER

## 2022-02-13 PROCEDURE — 85025 COMPLETE CBC W/AUTO DIFF WBC: CPT | Mod: 91 | Performed by: NURSE PRACTITIONER

## 2022-02-13 PROCEDURE — 36600 WITHDRAWAL OF ARTERIAL BLOOD: CPT

## 2022-02-13 PROCEDURE — 84484 ASSAY OF TROPONIN QUANT: CPT | Performed by: EMERGENCY MEDICINE

## 2022-02-13 PROCEDURE — 85025 COMPLETE CBC W/AUTO DIFF WBC: CPT | Performed by: EMERGENCY MEDICINE

## 2022-02-13 PROCEDURE — 99900035 HC TECH TIME PER 15 MIN (STAT)

## 2022-02-13 RX ORDER — NITROGLYCERIN 20 MG/100ML
0-400 INJECTION INTRAVENOUS CONTINUOUS
Status: DISCONTINUED | OUTPATIENT
Start: 2022-02-13 | End: 2022-02-13

## 2022-02-13 RX ORDER — INSULIN ASPART 100 [IU]/ML
1-10 INJECTION, SOLUTION INTRAVENOUS; SUBCUTANEOUS
Status: DISCONTINUED | OUTPATIENT
Start: 2022-02-13 | End: 2022-02-15 | Stop reason: HOSPADM

## 2022-02-13 RX ORDER — FUROSEMIDE 10 MG/ML
40 INJECTION INTRAMUSCULAR; INTRAVENOUS
Status: DISCONTINUED | OUTPATIENT
Start: 2022-02-13 | End: 2022-02-15

## 2022-02-13 RX ORDER — CLOPIDOGREL BISULFATE 75 MG/1
75 TABLET ORAL DAILY
Status: DISCONTINUED | OUTPATIENT
Start: 2022-02-13 | End: 2022-02-15 | Stop reason: HOSPADM

## 2022-02-13 RX ORDER — ESCITALOPRAM OXALATE 10 MG/1
10 TABLET ORAL 2 TIMES DAILY
Status: DISCONTINUED | OUTPATIENT
Start: 2022-02-13 | End: 2022-02-15 | Stop reason: HOSPADM

## 2022-02-13 RX ORDER — CLONAZEPAM 1 MG/1
1 TABLET ORAL DAILY PRN
COMMUNITY
Start: 2022-02-08 | End: 2023-05-17

## 2022-02-13 RX ORDER — LISINOPRIL 10 MG/1
10 TABLET ORAL DAILY
Status: DISCONTINUED | OUTPATIENT
Start: 2022-02-13 | End: 2022-02-15 | Stop reason: HOSPADM

## 2022-02-13 RX ORDER — SODIUM CHLORIDE 0.9 % (FLUSH) 0.9 %
10 SYRINGE (ML) INJECTION
Status: DISCONTINUED | OUTPATIENT
Start: 2022-02-13 | End: 2022-02-15 | Stop reason: HOSPADM

## 2022-02-13 RX ORDER — METOPROLOL SUCCINATE 50 MG/1
100 TABLET, EXTENDED RELEASE ORAL DAILY
Status: DISCONTINUED | OUTPATIENT
Start: 2022-02-13 | End: 2022-02-15 | Stop reason: HOSPADM

## 2022-02-13 RX ORDER — OXYBUTYNIN CHLORIDE 5 MG/1
5 TABLET, EXTENDED RELEASE ORAL DAILY
Status: DISCONTINUED | OUTPATIENT
Start: 2022-02-13 | End: 2022-02-15 | Stop reason: HOSPADM

## 2022-02-13 RX ORDER — IBUPROFEN 200 MG
16 TABLET ORAL
Status: DISCONTINUED | OUTPATIENT
Start: 2022-02-13 | End: 2022-02-15 | Stop reason: HOSPADM

## 2022-02-13 RX ORDER — ONDANSETRON 2 MG/ML
8 INJECTION INTRAMUSCULAR; INTRAVENOUS EVERY 6 HOURS PRN
Status: DISCONTINUED | OUTPATIENT
Start: 2022-02-13 | End: 2022-02-15 | Stop reason: HOSPADM

## 2022-02-13 RX ORDER — FUROSEMIDE 10 MG/ML
80 INJECTION INTRAMUSCULAR; INTRAVENOUS
Status: COMPLETED | OUTPATIENT
Start: 2022-02-13 | End: 2022-02-13

## 2022-02-13 RX ORDER — AMOXICILLIN 250 MG
1 CAPSULE ORAL 2 TIMES DAILY
Status: DISCONTINUED | OUTPATIENT
Start: 2022-02-13 | End: 2022-02-15 | Stop reason: HOSPADM

## 2022-02-13 RX ORDER — IBUPROFEN 200 MG
24 TABLET ORAL
Status: DISCONTINUED | OUTPATIENT
Start: 2022-02-13 | End: 2022-02-15 | Stop reason: HOSPADM

## 2022-02-13 RX ORDER — GLUCAGON 1 MG
1 KIT INJECTION
Status: DISCONTINUED | OUTPATIENT
Start: 2022-02-13 | End: 2022-02-15 | Stop reason: HOSPADM

## 2022-02-13 RX ORDER — ATORVASTATIN CALCIUM 20 MG/1
20 TABLET, FILM COATED ORAL DAILY
Status: DISCONTINUED | OUTPATIENT
Start: 2022-02-13 | End: 2022-02-15 | Stop reason: HOSPADM

## 2022-02-13 RX ORDER — ENOXAPARIN SODIUM 100 MG/ML
40 INJECTION SUBCUTANEOUS EVERY 24 HOURS
Status: DISCONTINUED | OUTPATIENT
Start: 2022-02-13 | End: 2022-02-15 | Stop reason: HOSPADM

## 2022-02-13 RX ORDER — ASPIRIN 81 MG/1
81 TABLET ORAL DAILY
Status: DISCONTINUED | OUTPATIENT
Start: 2022-02-13 | End: 2022-02-15 | Stop reason: HOSPADM

## 2022-02-13 RX ORDER — CLONAZEPAM 0.5 MG/1
1 TABLET ORAL DAILY PRN
Status: DISCONTINUED | OUTPATIENT
Start: 2022-02-13 | End: 2022-02-15 | Stop reason: HOSPADM

## 2022-02-13 RX ORDER — FENOFIBRATE 145 MG/1
145 TABLET, FILM COATED ORAL DAILY
Status: DISCONTINUED | OUTPATIENT
Start: 2022-02-13 | End: 2022-02-15 | Stop reason: HOSPADM

## 2022-02-13 RX ADMIN — ESCITALOPRAM OXALATE 10 MG: 10 TABLET, FILM COATED ORAL at 10:02

## 2022-02-13 RX ADMIN — LISINOPRIL 10 MG: 10 TABLET ORAL at 10:02

## 2022-02-13 RX ADMIN — ENOXAPARIN SODIUM 40 MG: 100 INJECTION SUBCUTANEOUS at 04:02

## 2022-02-13 RX ADMIN — NITROGLYCERIN 40 MCG/MIN: 20 INJECTION INTRAVENOUS at 01:02

## 2022-02-13 RX ADMIN — FUROSEMIDE 80 MG: 10 INJECTION, SOLUTION INTRAMUSCULAR; INTRAVENOUS at 01:02

## 2022-02-13 RX ADMIN — ESCITALOPRAM OXALATE 10 MG: 10 TABLET, FILM COATED ORAL at 08:02

## 2022-02-13 RX ADMIN — FUROSEMIDE 40 MG: 10 INJECTION, SOLUTION INTRAMUSCULAR; INTRAVENOUS at 04:02

## 2022-02-13 RX ADMIN — ATORVASTATIN CALCIUM 20 MG: 20 TABLET, FILM COATED ORAL at 10:02

## 2022-02-13 RX ADMIN — METOPROLOL SUCCINATE 100 MG: 50 TABLET, EXTENDED RELEASE ORAL at 10:02

## 2022-02-13 RX ADMIN — DOCUSATE SODIUM AND SENNOSIDES 1 TABLET: 8.6; 5 TABLET, FILM COATED ORAL at 10:02

## 2022-02-13 RX ADMIN — CLOPIDOGREL 75 MG: 75 TABLET, FILM COATED ORAL at 10:02

## 2022-02-13 RX ADMIN — CLONAZEPAM 1 MG: 0.5 TABLET ORAL at 08:02

## 2022-02-13 RX ADMIN — FENOFIBRATE 145 MG: 145 TABLET, FILM COATED ORAL at 10:02

## 2022-02-13 RX ADMIN — ASPIRIN 81 MG: 81 TABLET, COATED ORAL at 10:02

## 2022-02-13 RX ADMIN — FUROSEMIDE 40 MG: 10 INJECTION, SOLUTION INTRAMUSCULAR; INTRAVENOUS at 05:02

## 2022-02-13 NOTE — ED NOTES
Patient presenting to ED via EMS for SOB. Patient currently has CPAP in place. Patient AAOx3 with GCS 15. Patient immediately placed on BIPAP. Patient  at side. Patient updated on plan of care and verbalized understanding.

## 2022-02-13 NOTE — ED NOTES
Patient resting in bed with BiPAP in place. Patient with even and unlabored breathing. Patient reporting feeling much better with breathing. Patient with miller draining clear yellow urine. Patient denies any needs at this time. vss/nadn

## 2022-02-13 NOTE — CARE UPDATE
02/13/22 0716   Patient Assessment/Suction   Level of Consciousness (AVPU) alert   PRE-TX-O2   O2 Device (Oxygen Therapy) nasal cannula   $ Is the patient on Low Flow Oxygen? Yes   Flow (L/min) 4  (decreased to 3L)   SpO2 98 %   Pulse Oximetry Type Intermittent   $ Pulse Oximetry - Multiple Charge Pulse Oximetry - Multiple

## 2022-02-13 NOTE — ASSESSMENT & PLAN NOTE
Hyperkalemia:   - K upon admission: 5.3  -Lasix given, will recheck level  -no EKG changes  - Will continue to monitor

## 2022-02-13 NOTE — ASSESSMENT & PLAN NOTE
Patient is identified as having Systolic (HFrEF) heart failure that is Acute on chronic. CHF is currently uncontrolled due to Continued edema of extremities, Rales/crackles on pulmonary exam and Pulmonary edema/pleural effusion on CXR. Latest ECHO performed and demonstrates- No results found for this or any previous visit.  . Continue Beta Blocker and Furosemide and monitor clinical status closely. Monitor on telemetry. Patient is on CHF pathway.  Monitor strict Is&Os and daily weights.  Place on fluid restriction of 1.5 L. Continue to stress to patient importance of self efficacy and  on diet for CHF. Last BNP reviewed- and noted below   Recent Labs   Lab 02/13/22  0115   *   .

## 2022-02-13 NOTE — ED NOTES
Patient continues to remain on 4 L NC with no shortness or breath or labored breathing. Patient and  updated on admission status. No questions noted at this time. vss/nadn

## 2022-02-13 NOTE — H&P
[facility] Medicine  History & Physical    Patient Name: Celine Steen  MRN: [id number]  Patient Class: IP- Inpatient  Admission Date: 2/13/2022  Attending Physician: Sonia Hsieh MD   Primary Care Provider: Primary Doctor No         Patient information was obtained from patient, past medical records and ER records.     Subjective:     Principal Problem:Acute exacerbation of CHF (congestive heart failure)    Chief Complaint:   Chief Complaint   Patient presents with    Shortness of Breath        HPI: Celine Steen is a 66 year old female with a past medical history of HTN, HLD, DM, CAD, anxiety and past surgical history of CABG and defibrillator placement, who presented to the ED with a complaint of sudden onset SOB. She states she was diagnosed with covid on 1/27/22, and while she remained essentially asymptomatic, she states she has had more fatigue and sleeping more than normal. This Friday she went out shopping with no issue, but when she got home, felt very fatigued and SOB. She laid down for a bit and the symptoms resolved. She states today the same thing happened to her, but significantly worse. She states that she has been diagnosed with CHF in the past, but does not know much about her diagnosis. She denies chest pain, nausea, vomiting, diarrhea, fever, chills, lightheadedness, abdominal pain or other complaint.    Upon arrival to the ER, the patient was in severe respiratory distress and very hypertensive. She arrived on CPAP and was converted to BiPAP. She was initiated on a Tridil drip, but that was dc'ed shortly after starting. CXR showed bilateral pulmonary edema/congestion, and she was given Lasix 80 mg IV. She began to diurese and became less short of breath. She was taken off BiPAP and placed on 4L O2 via NC, tolerating with no issue or distress with an oxygen saturation of %. BNP level was 115. Troponin negative. CMP showed a potassium of 5.3, glucose of  267 and anion gap of 21. ABG showed pH of 7.296, pCO2 49.3, pO2 361 and HCO3 24. Pending beta hydroxybutyrate level. CBC showed an elevated WBC of 17.78, no evidence of infection seen, pending procalcitonin level. Hospital Medicine consulted for admission and further management.       Past Medical History:   Diagnosis Date    Anxiety     CAD (coronary artery disease)     Chronic low back pain     Diabetes 2/1/2012    Diabetes mellitus     Hypertension        Past Surgical History:   Procedure Laterality Date    CARDIAC DEFIBRILLATOR PLACEMENT  2007    CORONARY ARTERY BYPASS GRAFT  2007    HYSTERECTOMY         Review of patient's allergies indicates:  No Known Allergies    No current facility-administered medications on file prior to encounter.     Current Outpatient Medications on File Prior to Encounter   Medication Sig    aspirin (ECOTRIN) 81 MG EC tablet Take 81 mg by mouth once daily.      cetirizine (ZYRTEC) 10 MG tablet Take 1 tablet (10 mg total) by mouth once daily.    clonazePAM (KLONOPIN) 1 MG tablet Take 1 mg by mouth daily as needed.    clopidogrel (PLAVIX) 75 mg tablet TAKE 1 TABLET BY MOUTH EVERY DAY    coenzyme Q10 (CO Q-10) 200 mg capsule Take 200 mg by mouth once daily.      escitalopram oxalate (LEXAPRO) 20 MG tablet TAKE 1/2 TABLET BY MOUTH TWICE DAILY    fenofibrate (TRICOR) 145 MG tablet TAKE 1 TABLET BY MOUTH EVERY DAY    furosemide (LASIX) 40 MG tablet Take 0.5 tablets (20 mg total) by mouth daily as needed.    lisinopril 10 MG tablet TAKE 1 TABLET BY MOUTH EVERY DAY    metformin (GLUCOPHAGE) 500 MG tablet TAKE 1 TABLET BY MOUTH TWICE DAILY    metoprolol succinate (TOPROL-XL) 100 MG 24 hr tablet TAKE 1 TABLET BY MOUTH EVERY DAY    nicotine (NICODERM CQ) 14 mg/24 hr Place 1 patch onto the skin once daily. Okay to dispense generic.    nitroGLYCERIN (NITROSTAT) 0.3 MG SL tablet Place 1 tablet (0.3 mg total) under the tongue every 5 (five) minutes as needed for Chest pain.     oxybutynin (DITROPAN-XL) 5 MG TR24 Take 1 tablet (5 mg total) by mouth once daily.    simvastatin (ZOCOR) 40 MG tablet TAKE 1 TABLET BY MOUTH EVERY EVENING    varenicline (CHANTIX) 1 mg Tab Take 1 tablet (1 mg total) by mouth 2 (two) times daily.    [DISCONTINUED] alprazolam (XANAX) 1 MG tablet TAKE 1 TABLET BY MOUTH TWICE DAILY AS NEEDED FOR ANXIETY    [DISCONTINUED] alprazolam (XANAX) 1 MG tablet TAKE 1 TABLET BY MOUTH TWICE DAILY AS NEEDED ANXIETY    [DISCONTINUED] azithromycin (Z-MAG) 250 MG tablet Take 2 tablets by mouth on day 1; Take 1 tablet by mouth on days 2-5    [DISCONTINUED] buprenorphine HCl/naloxone HCl (SUBOXONE SL) Place under the tongue.    [DISCONTINUED] fluticasone propionate (FLONASE) 50 mcg/actuation nasal spray 2 sprays (100 mcg total) by Each Nare route 2 (two) times daily.    [DISCONTINUED] hydrocodone-acetaminophen 10-325mg (NORCO)  mg Tab Take 1 tablet by mouth every 12 (twelve) hours as needed.     Family History     Problem Relation (Age of Onset)    Cerebral aneurysm Brother    Diabetes Mother    Heart disease Brother    Mental illness Brother        Tobacco Use    Smoking status: Former Smoker     Quit date:      Years since quittin.1    Smokeless tobacco: Never Used   Substance and Sexual Activity    Alcohol use: No    Drug use: Not on file    Sexual activity: Not on file     Review of Systems   Constitutional: Positive for fatigue. Negative for chills and fever.   HENT: Negative for congestion and sore throat.    Eyes: Negative for visual disturbance.   Respiratory: Positive for shortness of breath and wheezing. Negative for cough.    Cardiovascular: Positive for leg swelling. Negative for chest pain and palpitations.   Gastrointestinal: Negative for abdominal pain, nausea and vomiting.   Endocrine: Negative for cold intolerance and heat intolerance.   Genitourinary: Negative for dysuria and hematuria.   Musculoskeletal: Negative for arthralgias and  myalgias.   Skin: Negative for pallor and rash.   Neurological: Positive for weakness. Negative for tremors and seizures.   Hematological: Negative for adenopathy. Does not bruise/bleed easily.   Psychiatric/Behavioral: Negative for hallucinations.   All other systems reviewed and are negative.    Objective:     Vital Signs (Most Recent):  Temp: 99 °F (37.2 °C) (22 0144)  Pulse: 90 (22 0330)  Resp: 18 (22 0316)  BP: (!) 140/74 (22 0330)  SpO2: 95 % (22) Vital Signs (24h Range):  Temp:  [99 °F (37.2 °C)] 99 °F (37.2 °C)  Pulse:  [] 90  Resp:  [18-30] 18  SpO2:  [82 %-100 %] 95 %  BP: (116-234)/() 140/74     Weight: 77.1 kg (170 lb)  Body mass index is 31.09 kg/m².    Physical Exam  Constitutional:       General: She is awake. She is not in acute distress.     Appearance: Normal appearance. She is well-developed. She is ill-appearing (chronically ill and elderly ).   HENT:      Head: Normocephalic and atraumatic.      Mouth/Throat:      Lips: Pink.      Mouth: Mucous membranes are moist.      Dentition: Abnormal dentition.   Eyes:      Conjunctiva/sclera: Conjunctivae normal.      Pupils: Pupils are equal, round, and reactive to light.   Neck:      Thyroid: No thyromegaly.      Vascular: No JVD.   Cardiovascular:      Rate and Rhythm: Normal rate and regular rhythm.      Heart sounds: Normal heart sounds, S1 normal and S2 normal. No murmur heard.  No friction rub. No gallop.    Pulmonary:      Effort: Pulmonary effort is normal.      Breath sounds: Examination of the right-lower field reveals rales. Examination of the left-lower field reveals rales. Rales present.   Chest:       Abdominal:      General: Bowel sounds are normal. There is no distension.      Palpations: Abdomen is soft. There is no mass.      Tenderness: There is no abdominal tenderness.   Musculoskeletal:         General: Normal range of motion.      Cervical back: Neck supple.      Right lower le+  Pitting Edema present.      Left lower le+ Pitting Edema present.   Skin:     General: Skin is warm and dry.      Capillary Refill: Capillary refill takes less than 2 seconds.   Neurological:      General: No focal deficit present.      Mental Status: She is alert and oriented to person, place, and time. Mental status is at baseline.      Cranial Nerves: No cranial nerve deficit.      Sensory: Sensation is intact.      Motor: Motor function is intact.   Psychiatric:         Behavior: Behavior normal. Behavior is cooperative.           CRANIAL NERVES     CN III, IV, VI   Pupils are equal, round, and reactive to light.       Significant Labs:   All pertinent labs within the past 24 hours have been reviewed.  CBC:   Recent Labs   Lab 22   WBC 17.78*   HGB 13.5   HCT 44.0        CMP:   Recent Labs   Lab 22   *   K 5.3*   CL 99   CO2 15*   *   BUN 20   CREATININE 1.1   CALCIUM 9.8   PROT 8.7*   ALBUMIN 3.6   BILITOT 0.3   ALKPHOS 50*   AST 52*   ALT 37   ANIONGAP 21*   EGFRNONAA 52*     Cardiac Markers:   Recent Labs   Lab 22  011   *     Troponin:   Recent Labs   Lab 22   TROPONINI 0.010     Recent Labs  ABG     22  0143   PH 7.296*   PCO2 49.3*   PO2 361*   HCO3 24.0   POCSATURATED 100   BE -2     Significant Imaging:   CXR: pulmonary edema, no evidence of infectious process    Assessment/Plan:     * Acute exacerbation of CHF (congestive heart failure)  Patient is identified as having Systolic (HFrEF) heart failure that is Acute on chronic. CHF is currently uncontrolled due to Continued edema of extremities, Rales/crackles on pulmonary exam and Pulmonary edema/pleural effusion on CXR. Latest ECHO performed and demonstrates- No results found for this or any previous visit.  . Continue Beta Blocker and Furosemide and monitor clinical status closely. Monitor on telemetry. Patient is on CHF pathway.  Monitor strict Is&Os and daily weights.   Place on fluid restriction of 1.5 L. Continue to stress to patient importance of self efficacy and  on diet for CHF. Last BNP reviewed- and noted below   Recent Labs   Lab 02/13/22  0115   *   .      Hyperkalemia  Hyperkalemia:   - K upon admission: 5.3  -Lasix given, will recheck level  -no EKG changes  - Will continue to monitor      Leukocytosis  WBC 17.78  cxr shows no evidence of acute infection, only edema  Pending procalcitonin level      Metabolic acidosis with respiratory acidosis  Monitor CMP as well as ABGs  Check beta hydroxybutyrate level-pending  Off bipap now, no distress noted  Recent Labs     02/13/22  0143   PH 7.296*   PCO2 49.3*   PO2 361*   HCO3 24.0   POCSATURATED 100   BE -2     Sodium 135 Low  mmol/L Albumin 3.6 g/dL   Potassium 5.3 High  mmol/L Total Bilirubin 0.3 mg/dL    Chloride 99 mmol/L Alkaline Phosphatase 50 Low  U/L   CO2 15 Low  mmol/L AST 52 High  U/L   Glucose 267 High  mg/dL ALT 37 U/L   BUN 20 mg/dL Anion Gap 21 High  mmol/L   Creatinine 1.1 mg/dL eGFR if African American 60 mL/min/1.73 m^2   Calcium 9.8 mg/dL eGFR if non  52 Abnormal  mL/min/1.73 m^2    Total Protein 8.7 High  g/dL                  ICD (implantable cardioverter-defibrillator) in place  Noted, continue tele      Bipolar 1 disorder    Noted, continue home meds    Type 2 diabetes mellitus without complication  Patient's FSGs are uncontrolled due to hyperglycemia on current medication regimen.  Last A1c reviewed-   Lab Results   Component Value Date    HGBA1C 6.3 (H) 08/25/2021     Most recent fingerstick glucose reviewed- No results for input(s): POCTGLUCOSE in the last 24 hours.  Current correctional scale  Medium  Maintain anti-hyperglycemic dose as follows-   Antihyperglycemics (From admission, onward)            Start     Stop Route Frequency Ordered    02/13/22 0443  insulin aspart U-100 pen 1-10 Units         -- SubQ Before meals & nightly PRN 02/13/22 0343        Hold Oral  hypoglycemics while patient is in the hospital.     Pending Beta hydroxybutyrate level        Essential hypertension  Chronic, controlled.  Latest blood pressure and vitals reviewed-   Temp:  [99 °F (37.2 °C)]   Pulse:  []   Resp:  [18-30]   BP: (116-234)/()   SpO2:  [82 %-100 %] .   Home meds for hypertension were reviewed and noted below.   Hypertension Medications             furosemide (LASIX) 40 MG tablet Take 0.5 tablets (20 mg total) by mouth daily as needed.    lisinopril 10 MG tablet TAKE 1 TABLET BY MOUTH EVERY DAY    metoprolol succinate (TOPROL-XL) 100 MG 24 hr tablet TAKE 1 TABLET BY MOUTH EVERY DAY    nitroGLYCERIN (NITROSTAT) 0.3 MG SL tablet Place 1 tablet (0.3 mg total) under the tongue every 5 (five) minutes as needed for Chest pain.          While in the hospital, will manage blood pressure as follows; Continue home antihypertensive regimen    Will utilize p.r.n. blood pressure medication only if patient's blood pressure greater than  180/110 and she develops symptoms such as worsening chest pain or shortness of breath.        CAD (coronary artery disease)  Patient with known CAD s/p CABG, which is controlled Will continue ASA, Plavix and Statin and monitor for S/Sx of angina/ACS. Continue to monitor on telemetry.           VTE Risk Mitigation (From admission, onward)         Ordered     enoxaparin injection 40 mg  Daily         02/13/22 0340     IP VTE HIGH RISK PATIENT  Once         02/13/22 0340     Place sequential compression device  Until discontinued         02/13/22 0340                   JENNIFER Mitchell  Department of Hospital Medicine   [facility]

## 2022-02-13 NOTE — PLAN OF CARE
[facility]  Initial Discharge Assessment       Primary Care Provider: Josseline Vargas NP    Admission Diagnosis: Shortness of breath [R06.02]  Acute exacerbation of CHF (congestive heart failure) [I50.9]    Admission Date: 2/13/2022  Expected Discharge Date:     Cm completed the assessment with pt and spouse at bedside.  Demographic, pcp and insurance is current on face sheet. Pt has a LW.  Pt is on Plavix, denies dialysis. Pt is a diabetic. dme listed below.  Disposition:  Pt will discharge to home. Spouse will provide transportation on discharge.  No current needs at this time.        Discharge Barriers Identified: None    Payor: MEDICARE / Plan: MEDICARE PART A & B / Product Type: Government /     Extended Emergency Contact Information  Primary Emergency Contact: Loi Steen  Address: [address] [place] [address] [place] [address] [place] [facility]  Mobile Phone: [phone]  Relation: Spouse    Discharge Plan A: Home with family  Discharge Plan B: Home with family      [id number] - [place] - [address] & [facility]  [address]  [place] [address]  Phone: [phone] Fax: [phone]      Initial Assessment (most recent)     Adult Discharge Assessment - 02/13/22 1630        Discharge Assessment    Assessment Type Discharge Planning Assessment     Confirmed/corrected address, phone number and insurance Yes     Confirmed Demographics Correct on Facesheet     Source of Information patient;family     Reason For Admission shortness of breath     Lives With spouse     Facility Arrived From: home     Do you expect to return to your current living situation? Yes     Do you have help at home or someone to help you manage your care at home? Yes     Who are your caregiver(s) and their phone number(s)? loi - [phone]     Prior to hospitilization cognitive status: Alert/Oriented     Current cognitive status:  Alert/Oriented     Walking or Climbing Stairs Difficulty ambulation difficulty, requires equipment     Dressing/Bathing Difficulty none     Equipment Currently Used at Home cane, quad;rollator     Readmission within 30 days? No     Patient currently being followed by outpatient case management? No     Do you currently have service(s) that help you manage your care at home? No     Do you take prescription medications? Yes     Do you have prescription coverage? Yes     Do you have any problems affording any of your prescribed medications? No     Is the patient taking medications as prescribed? yes     Who is going to help you get home at discharge? spouse     How do you get to doctors appointments? car, drives self;family or friend will provide     Are you on dialysis? No     Do you take coumadin? No   Plavix    Discharge Plan A Home with family     Discharge Plan B Home with family     DME Needed Upon Discharge  none     Discharge Plan discussed with: Spouse/sig other;Patient     Name(s) and Number(s) same as above     Discharge Barriers Identified None

## 2022-02-13 NOTE — CARE UPDATE
02/13/22 0835   Patient Assessment/Suction   Level of Consciousness (AVPU) alert   PRE-TX-O2   O2 Device (Oxygen Therapy) nasal cannula   Flow (L/min) 3  (decreased to 2L)   SpO2 96 %   Pulse Oximetry Type Intermittent

## 2022-02-13 NOTE — ASSESSMENT & PLAN NOTE
Patient's FSGs are uncontrolled due to hyperglycemia on current medication regimen.  Last A1c reviewed-   Lab Results   Component Value Date    HGBA1C 6.3 (H) 08/25/2021     Most recent fingerstick glucose reviewed- No results for input(s): POCTGLUCOSE in the last 24 hours.  Current correctional scale  Medium  Maintain anti-hyperglycemic dose as follows-   Antihyperglycemics (From admission, onward)            Start     Stop Route Frequency Ordered    02/13/22 0443  insulin aspart U-100 pen 1-10 Units         -- SubQ Before meals & nightly PRN 02/13/22 0343        Hold Oral hypoglycemics while patient is in the hospital.     Pending Beta hydroxybutyrate level

## 2022-02-13 NOTE — ASSESSMENT & PLAN NOTE
Monitor CMP as well as ABGs  Check beta hydroxybutyrate level-pending  Off bipap now, no distress noted  Recent Labs     02/13/22  0143   PH 7.296*   PCO2 49.3*   PO2 361*   HCO3 24.0   POCSATURATED 100   BE -2     Sodium 135 Low  mmol/L Albumin 3.6 g/dL   Potassium 5.3 High  mmol/L Total Bilirubin 0.3 mg/dL    Chloride 99 mmol/L Alkaline Phosphatase 50 Low  U/L   CO2 15 Low  mmol/L AST 52 High  U/L   Glucose 267 High  mg/dL ALT 37 U/L   BUN 20 mg/dL Anion Gap 21 High  mmol/L   Creatinine 1.1 mg/dL eGFR if African American 60 mL/min/1.73 m^2   Calcium 9.8 mg/dL eGFR if non  52 Abnormal  mL/min/1.73 m^2    Total Protein 8.7 High  g/dL

## 2022-02-13 NOTE — ED NOTES
Patient taken off BiPAP per ER Provider request. Patient placed on 4 L NC. Will monitor oxygen saturations

## 2022-02-13 NOTE — SUBJECTIVE & OBJECTIVE
Past Medical History:   Diagnosis Date    Anxiety     CAD (coronary artery disease)     Chronic low back pain     Diabetes 2/1/2012    Diabetes mellitus     Hypertension        Past Surgical History:   Procedure Laterality Date    CARDIAC DEFIBRILLATOR PLACEMENT  2007    CORONARY ARTERY BYPASS GRAFT  2007    HYSTERECTOMY         Review of patient's allergies indicates:  No Known Allergies    No current facility-administered medications on file prior to encounter.     Current Outpatient Medications on File Prior to Encounter   Medication Sig    aspirin (ECOTRIN) 81 MG EC tablet Take 81 mg by mouth once daily.      cetirizine (ZYRTEC) 10 MG tablet Take 1 tablet (10 mg total) by mouth once daily.    clonazePAM (KLONOPIN) 1 MG tablet Take 1 mg by mouth daily as needed.    clopidogrel (PLAVIX) 75 mg tablet TAKE 1 TABLET BY MOUTH EVERY DAY    coenzyme Q10 (CO Q-10) 200 mg capsule Take 200 mg by mouth once daily.      escitalopram oxalate (LEXAPRO) 20 MG tablet TAKE 1/2 TABLET BY MOUTH TWICE DAILY    fenofibrate (TRICOR) 145 MG tablet TAKE 1 TABLET BY MOUTH EVERY DAY    furosemide (LASIX) 40 MG tablet Take 0.5 tablets (20 mg total) by mouth daily as needed.    lisinopril 10 MG tablet TAKE 1 TABLET BY MOUTH EVERY DAY    metformin (GLUCOPHAGE) 500 MG tablet TAKE 1 TABLET BY MOUTH TWICE DAILY    metoprolol succinate (TOPROL-XL) 100 MG 24 hr tablet TAKE 1 TABLET BY MOUTH EVERY DAY    nicotine (NICODERM CQ) 14 mg/24 hr Place 1 patch onto the skin once daily. Okay to dispense generic.    nitroGLYCERIN (NITROSTAT) 0.3 MG SL tablet Place 1 tablet (0.3 mg total) under the tongue every 5 (five) minutes as needed for Chest pain.    oxybutynin (DITROPAN-XL) 5 MG TR24 Take 1 tablet (5 mg total) by mouth once daily.    simvastatin (ZOCOR) 40 MG tablet TAKE 1 TABLET BY MOUTH EVERY EVENING    varenicline (CHANTIX) 1 mg Tab Take 1 tablet (1 mg total) by mouth 2 (two) times daily.    [DISCONTINUED] alprazolam  (XANAX) 1 MG tablet TAKE 1 TABLET BY MOUTH TWICE DAILY AS NEEDED FOR ANXIETY    [DISCONTINUED] alprazolam (XANAX) 1 MG tablet TAKE 1 TABLET BY MOUTH TWICE DAILY AS NEEDED ANXIETY    [DISCONTINUED] azithromycin (Z-MAG) 250 MG tablet Take 2 tablets by mouth on day 1; Take 1 tablet by mouth on days 2-5    [DISCONTINUED] buprenorphine HCl/naloxone HCl (SUBOXONE SL) Place under the tongue.    [DISCONTINUED] fluticasone propionate (FLONASE) 50 mcg/actuation nasal spray 2 sprays (100 mcg total) by Each Nare route 2 (two) times daily.    [DISCONTINUED] hydrocodone-acetaminophen 10-325mg (NORCO)  mg Tab Take 1 tablet by mouth every 12 (twelve) hours as needed.     Family History     Problem Relation (Age of Onset)    Cerebral aneurysm Brother    Diabetes Mother    Heart disease Brother    Mental illness Brother        Tobacco Use    Smoking status: Former Smoker     Quit date:      Years since quittin.1    Smokeless tobacco: Never Used   Substance and Sexual Activity    Alcohol use: No    Drug use: Not on file    Sexual activity: Not on file     Review of Systems   Constitutional: Positive for fatigue. Negative for chills and fever.   HENT: Negative for congestion and sore throat.    Eyes: Negative for visual disturbance.   Respiratory: Positive for shortness of breath and wheezing. Negative for cough.    Cardiovascular: Positive for leg swelling. Negative for chest pain and palpitations.   Gastrointestinal: Negative for abdominal pain, nausea and vomiting.   Endocrine: Negative for cold intolerance and heat intolerance.   Genitourinary: Negative for dysuria and hematuria.   Musculoskeletal: Negative for arthralgias and myalgias.   Skin: Negative for pallor and rash.   Neurological: Positive for weakness. Negative for tremors and seizures.   Hematological: Negative for adenopathy. Does not bruise/bleed easily.   Psychiatric/Behavioral: Negative for hallucinations.   All other systems reviewed and are  negative.    Objective:     Vital Signs (Most Recent):  Temp: 99 °F (37.2 °C) (22 0144)  Pulse: 90 (22 0330)  Resp: 18 (22 0316)  BP: (!) 140/74 (22 0330)  SpO2: 95 % (22 033) Vital Signs (24h Range):  Temp:  [99 °F (37.2 °C)] 99 °F (37.2 °C)  Pulse:  [] 90  Resp:  [18-30] 18  SpO2:  [82 %-100 %] 95 %  BP: (116-234)/() 140/74     Weight: 77.1 kg (170 lb)  Body mass index is 31.09 kg/m².    Physical Exam  Constitutional:       General: She is awake. She is not in acute distress.     Appearance: Normal appearance. She is well-developed. She is ill-appearing (chronically ill and elderly ).   HENT:      Head: Normocephalic and atraumatic.      Mouth/Throat:      Lips: Pink.      Mouth: Mucous membranes are moist.      Dentition: Abnormal dentition.   Eyes:      Conjunctiva/sclera: Conjunctivae normal.      Pupils: Pupils are equal, round, and reactive to light.   Neck:      Thyroid: No thyromegaly.      Vascular: No JVD.   Cardiovascular:      Rate and Rhythm: Normal rate and regular rhythm.      Heart sounds: Normal heart sounds, S1 normal and S2 normal. No murmur heard.  No friction rub. No gallop.    Pulmonary:      Effort: Pulmonary effort is normal.      Breath sounds: Examination of the right-lower field reveals rales. Examination of the left-lower field reveals rales. Rales present.   Chest:       Abdominal:      General: Bowel sounds are normal. There is no distension.      Palpations: Abdomen is soft. There is no mass.      Tenderness: There is no abdominal tenderness.   Musculoskeletal:         General: Normal range of motion.      Cervical back: Neck supple.      Right lower le+ Pitting Edema present.      Left lower le+ Pitting Edema present.   Skin:     General: Skin is warm and dry.      Capillary Refill: Capillary refill takes less than 2 seconds.   Neurological:      General: No focal deficit present.      Mental Status: She is alert and oriented to  person, place, and time. Mental status is at baseline.      Cranial Nerves: No cranial nerve deficit.      Sensory: Sensation is intact.      Motor: Motor function is intact.   Psychiatric:         Behavior: Behavior normal. Behavior is cooperative.           CRANIAL NERVES     CN III, IV, VI   Pupils are equal, round, and reactive to light.       Significant Labs:   All pertinent labs within the past 24 hours have been reviewed.  CBC:   Recent Labs   Lab 02/13/22  0115   WBC 17.78*   HGB 13.5   HCT 44.0        CMP:   Recent Labs   Lab 02/13/22 0115   *   K 5.3*   CL 99   CO2 15*   *   BUN 20   CREATININE 1.1   CALCIUM 9.8   PROT 8.7*   ALBUMIN 3.6   BILITOT 0.3   ALKPHOS 50*   AST 52*   ALT 37   ANIONGAP 21*   EGFRNONAA 52*     Cardiac Markers:   Recent Labs   Lab 02/13/22  0115   *     Troponin:   Recent Labs   Lab 02/13/22 0115   TROPONINI 0.010     Recent Labs  ABG     02/13/22  0143   PH 7.296*   PCO2 49.3*   PO2 361*   HCO3 24.0   POCSATURATED 100   BE -2     Significant Imaging:   CXR: pulmonary edema, no evidence of infectious process

## 2022-02-13 NOTE — ASSESSMENT & PLAN NOTE
Chronic, controlled.  Latest blood pressure and vitals reviewed-   Temp:  [99 °F (37.2 °C)]   Pulse:  []   Resp:  [18-30]   BP: (116-234)/()   SpO2:  [82 %-100 %] .   Home meds for hypertension were reviewed and noted below.   Hypertension Medications             furosemide (LASIX) 40 MG tablet Take 0.5 tablets (20 mg total) by mouth daily as needed.    lisinopril 10 MG tablet TAKE 1 TABLET BY MOUTH EVERY DAY    metoprolol succinate (TOPROL-XL) 100 MG 24 hr tablet TAKE 1 TABLET BY MOUTH EVERY DAY    nitroGLYCERIN (NITROSTAT) 0.3 MG SL tablet Place 1 tablet (0.3 mg total) under the tongue every 5 (five) minutes as needed for Chest pain.          While in the hospital, will manage blood pressure as follows; Continue home antihypertensive regimen    Will utilize p.r.n. blood pressure medication only if patient's blood pressure greater than  180/110 and she develops symptoms such as worsening chest pain or shortness of breath.

## 2022-02-13 NOTE — ED NOTES
Report given to HARRIS Stephen. Patient AAOX3 with GCS 15. Bilateral IVs patent. Patient currently on 4 L NC with no shortness of breath noted. Perez draining clear yellow urine.  at bedside.

## 2022-02-13 NOTE — HPI
Celine Steen is a 66 year old female with a past medical history of HTN, HLD, DM, CAD, anxiety and past surgical history of CABG and defibrillator placement, who presented to the ED with a complaint of sudden onset SOB. She states she was diagnosed with covid on 1/27/22, and while she remained essentially asymptomatic, she states she has had more fatigue and sleeping more than normal. This Friday she went out shopping with no issue, but when she got home, felt very fatigued and SOB. She laid down for a bit and the symptoms resolved. She states today the same thing happened to her, but significantly worse. She states that she has been diagnosed with CHF in the past, but does not know much about her diagnosis. She denies chest pain, nausea, vomiting, diarrhea, fever, chills, lightheadedness, abdominal pain or other complaint.    Upon arrival to the ER, the patient was in severe respiratory distress and very hypertensive. She arrived on CPAP and was converted to BiPAP. She was initiated on a Tridil drip, but that was dc'ed shortly after starting. CXR showed bilateral pulmonary edema/congestion, and she was given Lasix 80 mg IV. She began to diurese and became less short of breath. She was taken off BiPAP and placed on 4L O2 via NC, tolerating with no issue or distress with an oxygen saturation of %. BNP level was 115. Troponin negative. CMP showed a potassium of 5.3, glucose of 267 and anion gap of 21. ABG showed pH of 7.296, pCO2 49.3, pO2 361 and HCO3 24. Pending beta hydroxybutyrate level. CBC showed an elevated WBC of 17.78, no evidence of infection seen, pending procalcitonin level. Hospital Medicine consulted for admission and further management.

## 2022-02-13 NOTE — ED PROVIDER NOTES
Encounter Date: 2022       History     Chief Complaint   Patient presents with    Shortness of Breath     This patient is 66-year-old female Past Medical History:  No date: Anxiety  No date: CAD (coronary artery disease)  No date: Chronic low back pain  2012: Diabetes  No date: Diabetes mellitus  No date: Hypertension  Presenting per EMS in acute respiratory distress.  Accompanying reports state that she had abrupt onset of acute severe shortness of breath at home tonight and the patient endorses it has gradually worsened over last 24 hours.  She also reports worsening swelling in the lower extremities and a positive history of congestive heart failure.  She reports she has been taking her medications at home as prescribed.  She has no home oxygen.  History for recent and remote events are limited secondary to patient acuity, as she has received on CPAP and transferred to [facility].        Review of patient's allergies indicates:  No Known Allergies  Past Medical History:   Diagnosis Date    Anxiety     CAD (coronary artery disease)     Chronic low back pain     Diabetes 2012    Diabetes mellitus     Hypertension      Past Surgical History:   Procedure Laterality Date    CARDIAC DEFIBRILLATOR PLACEMENT      CORONARY ARTERY BYPASS GRAFT  2007    HYSTERECTOMY       Family History   Problem Relation Age of Onset    Diabetes Mother     Mental illness Brother     Heart disease Brother         MI    Cerebral aneurysm Brother      Social History     Tobacco Use    Smoking status: Former Smoker     Quit date: 2018     Years since quittin.1    Smokeless tobacco: Never Used   Substance Use Topics    Alcohol use: No     Review of Systems   Unable to perform ROS: Acuity of condition       Physical Exam     Initial Vitals   BP Pulse Resp Temp SpO2   22 0104 22 0104 22 0104 22 0144 22 0104   (!) 234/126 (!) 130 (!) 30 99 °F (37.2 °C) (!) 82 %      MAP       --                 Physical Exam    Nursing note and vitals reviewed.  Constitutional: She appears distressed.   HENT:   Head: Normocephalic and atraumatic.   Edentulous, dry mucous membranes   Eyes: Conjunctivae and EOM are normal.   Neck: Neck supple. JVD present.   Normal range of motion.  Cardiovascular: Intact distal pulses.   Tachycardic and regular   Pulmonary/Chest:   Rhonchorous breath sounds bilaterally, more predominant the bases   Abdominal: Bowel sounds are normal. She exhibits distension. There is no abdominal tenderness.   Musculoskeletal:      Cervical back: Normal range of motion and neck supple.      Comments: Bilateral and equal 1+ lower extremity pitting edema     Neurological: She is alert. GCS score is 15. GCS eye subscore is 4. GCS verbal subscore is 5. GCS motor subscore is 6.   Skin: Skin is warm and dry. No erythema. There is pallor.   Psychiatric: She has a normal mood and affect. Thought content normal.         ED Course   Procedures  Labs Reviewed   CBC W/ AUTO DIFFERENTIAL - Abnormal; Notable for the following components:       Result Value    WBC 17.78 (*)     MCHC 30.7 (*)     Immature Granulocytes 1.1 (*)     Gran # (ANC) 10.1 (*)     Immature Grans (Abs) 0.19 (*)     Lymph # 4.9 (*)     Mono # 2.0 (*)     All other components within normal limits    Narrative:     Release to patient->Immediate   COMPREHENSIVE METABOLIC PANEL - Abnormal; Notable for the following components:    Sodium 135 (*)     Potassium 5.3 (*)     CO2 15 (*)     Glucose 267 (*)     Total Protein 8.7 (*)     Alkaline Phosphatase 50 (*)     AST 52 (*)     Anion Gap 21 (*)     eGFR if non  52 (*)     All other components within normal limits    Narrative:     Release to patient->Immediate   B-TYPE NATRIURETIC PEPTIDE - Abnormal; Notable for the following components:     (*)     All other components within normal limits    Narrative:     Release to patient->Immediate   ISTAT PROCEDURE - Abnormal; Notable  for the following components:    POC PH 7.296 (*)     POC PCO2 49.3 (*)     POC PO2 361 (*)     All other components within normal limits   TROPONIN I    Narrative:     Release to patient->Immediate   SARS-COV-2 RNA AMPLIFICATION, QUAL   HEPATITIS C ANTIBODY   BETA - HYDROXYBUTYRATE, SERUM   HEMOGLOBIN A1C   MAGNESIUM   PHOSPHORUS   PROCALCITONIN   POCT GLUCOSE MONITORING CONTINUOUS     EKG Readings: (Independently Interpreted)   Sinus tachycardia, 113 beats per minute, rightward axis, septal infarct, age undetermined, no STEMI       Imaging Results          X-Ray Chest AP Portable (In process)                  Medications   nitroGLYCERIN 50 mg in dextrose 5 % 250 mL infusion (TITRATING) (0 mcg/min Intravenous Stopped 2/13/22 6236)   aspirin EC tablet 81 mg (has no administration in time range)   clonazePAM tablet 1 mg (has no administration in time range)   clopidogreL tablet 75 mg (has no administration in time range)   EScitalopram oxalate tablet 10 mg (has no administration in time range)   fenofibrate tablet 145 mg (has no administration in time range)   lisinopriL tablet 10 mg (has no administration in time range)   metoprolol succinate (TOPROL-XL) 24 hr tablet 100 mg (has no administration in time range)   oxybutynin 24 hr tablet 5 mg (has no administration in time range)   atorvastatin tablet 20 mg (has no administration in time range)   sodium chloride 0.9% flush 10 mL (has no administration in time range)   enoxaparin injection 40 mg (has no administration in time range)   furosemide injection 40 mg (has no administration in time range)   ondansetron injection 8 mg (has no administration in time range)   senna-docusate 8.6-50 mg per tablet 1 tablet (has no administration in time range)   glucose chewable tablet 16 g (has no administration in time range)   glucose chewable tablet 24 g (has no administration in time range)   dextrose 50% injection 12.5 g (has no administration in time range)   dextrose 50%  injection 25 g (has no administration in time range)   glucagon (human recombinant) injection 1 mg (has no administration in time range)   insulin aspart U-100 pen 1-10 Units (has no administration in time range)   furosemide injection 80 mg (80 mg Intravenous Given 2/13/22 0122)     Medical Decision Making:   ED Management:      Celine Steen is a 66 y.o. female who presents to the Emergency Department with severe acute shortness of breath.  My overall impression is congestive heart failure exacerbation.  I do not think the patient has acute coronary syndrome, pulmonary embolism, significant symptomatic anemia, grave electrolyte abnormality, pneumonia, pneumothorax.  Patient is visibly short of breath with crackles on exam and is transitioned to BiPAP with significant improvement..  Chest x-ray concerning for CHF.  BNP is minimally elevated at 115.  Patient started on Lasix and Tridil in the emergency department.  There was marked improvement in the emergency room and she was able to be transferred to 4 L nasal cannula.  I feel the patient needs to be admitted and the case was discussed with and accepted by the on-call hospitalist nurse practitioner.  Patient updated on the plan of care and she is in agreement with this disposition.  Transported to a telemetry bed in guarded condition.            Attending Attestation:         Attending Critical Care:   Critical Care Times:   Direct Patient Care (initial evaluation, reassessments, and time considering the case)................................................................15 minutes.   Additional History from reviewing old medical records or taking additional history from the family, EMS, PCP, etc.......................5 minutes.   Ordering, Reviewing, and Interpreting Diagnostic Studies...............................................................................................................5 minutes.    Documentation..................................................................................................................................................................................5 minutes.   Consultation with other Physicians. .................................................................................................................................................0 minutes.   Consultation with the patient's family directly relating to the patient's condition, care, and DNR status (when patient unable)......5 minutes.   Other..................................................................................................................................................................................................10 minutes.   ==============================================================  · Total Critical Care Time - exclusive of procedural time: 45 minutes.  ==============================================================  Critical care was necessary to treat or prevent imminent or life-threatening deterioration of the following conditions: congestive heart failure and respiratory failure.   The following critical care procedures were done by me (see procedure notes): pulse oximetry.   Critical care was time spent personally by me on the following activities: obtaining history from patient or relative, review of old charts, examination of patient, ordering lab, x-rays, and/or EKG, development of treatment plan with patient or relative, ordering and performing treatments and interventions, discussions with primary provider, evaluation of patient's response to treatment, re-evaluation of patient's conition and interpretation of cardiac measurements.   Critical Care Condition: life-threatening                    Clinical Impression:   Final diagnoses:  [R06.02] Shortness of breath  [I50.9] Acute exacerbation of CHF (congestive heart failure)          ED Disposition Condition    Admit                Eduin Prasad MD  02/13/22 7934

## 2022-02-14 PROBLEM — D72.829 LEUKOCYTOSIS: Status: RESOLVED | Noted: 2022-02-13 | Resolved: 2022-02-14

## 2022-02-14 PROBLEM — E87.5 HYPERKALEMIA: Status: RESOLVED | Noted: 2022-02-13 | Resolved: 2022-02-14

## 2022-02-14 PROBLEM — E87.4 METABOLIC ACIDOSIS WITH RESPIRATORY ACIDOSIS: Status: RESOLVED | Noted: 2022-02-13 | Resolved: 2022-02-14

## 2022-02-14 LAB
AORTIC ROOT ANNULUS: 3.09 CM
AORTIC VALVE CUSP SEPERATION: 1.87 CM
AV INDEX (PROSTH): 0.72
AV MEAN GRADIENT: 3 MMHG
AV PEAK GRADIENT: 6 MMHG
AV VALVE AREA: 2.03 CM2
AV VELOCITY RATIO: 0.72
BASOPHILS # BLD AUTO: 0.04 K/UL (ref 0–0.2)
BASOPHILS NFR BLD: 0.3 % (ref 0–1.9)
BSA FOR ECHO PROCEDURE: 1.83 M2
CV ECHO LV RWT: 0.45 CM
D DIMER PPP IA.FEU-MCNC: 0.66 MG/L FEU
DIFFERENTIAL METHOD: ABNORMAL
DOP CALC AO PEAK VEL: 1.23 M/S
DOP CALC AO VTI: 26.66 CM
DOP CALC LVOT AREA: 2.8 CM2
DOP CALC LVOT DIAMETER: 1.9 CM
DOP CALC LVOT PEAK VEL: 0.89 M/S
DOP CALC LVOT STROKE VOLUME: 54.24 CM3
DOP CALC MV VTI: 32.89 CM
DOP CALCLVOT PEAK VEL VTI: 19.14 CM
E WAVE DECELERATION TIME: 180.59 MSEC
E/A RATIO: 1.88
E/E' RATIO: 23.33 M/S
ECHO LV POSTERIOR WALL: 1.14 CM (ref 0.6–1.1)
EJECTION FRACTION: 35 %
EOSINOPHIL # BLD AUTO: 0.2 K/UL (ref 0–0.5)
EOSINOPHIL NFR BLD: 1.4 % (ref 0–8)
ERYTHROCYTE [DISTWIDTH] IN BLOOD BY AUTOMATED COUNT: 13.9 % (ref 11.5–14.5)
FRACTIONAL SHORTENING: 28 % (ref 28–44)
HCT VFR BLD AUTO: 41 % (ref 37–48.5)
HCV AB SERPL QL IA: NEGATIVE
HGB BLD-MCNC: 12.7 G/DL (ref 12–16)
IMM GRANULOCYTES # BLD AUTO: 0.07 K/UL (ref 0–0.04)
IMM GRANULOCYTES NFR BLD AUTO: 0.6 % (ref 0–0.5)
INTERVENTRICULAR SEPTUM: 0.97 CM (ref 0.6–1.1)
LA MAJOR: 5.47 CM
LA MINOR: 5.28 CM
LA WIDTH: 4.05 CM
LEFT ATRIUM SIZE: 4.1 CM
LEFT ATRIUM VOLUME INDEX: 42.6 ML/M2
LEFT ATRIUM VOLUME: 75.84 CM3
LEFT INTERNAL DIMENSION IN SYSTOLE: 3.66 CM (ref 2.1–4)
LEFT VENTRICLE DIASTOLIC VOLUME INDEX: 69.85 ML/M2
LEFT VENTRICLE DIASTOLIC VOLUME: 124.33 ML
LEFT VENTRICLE MASS INDEX: 114 G/M2
LEFT VENTRICLE SYSTOLIC VOLUME INDEX: 31.8 ML/M2
LEFT VENTRICLE SYSTOLIC VOLUME: 56.63 ML
LEFT VENTRICULAR INTERNAL DIMENSION IN DIASTOLE: 5.11 CM (ref 3.5–6)
LEFT VENTRICULAR MASS: 202.72 G
LV LATERAL E/E' RATIO: 17.5 M/S
LV SEPTAL E/E' RATIO: 35 M/S
LYMPHOCYTES # BLD AUTO: 2 K/UL (ref 1–4.8)
LYMPHOCYTES NFR BLD: 16.4 % (ref 18–48)
MCH RBC QN AUTO: 27 PG (ref 27–31)
MCHC RBC AUTO-ENTMCNC: 31 G/DL (ref 32–36)
MCV RBC AUTO: 87 FL (ref 82–98)
MONOCYTES # BLD AUTO: 1.9 K/UL (ref 0.3–1)
MONOCYTES NFR BLD: 16.2 % (ref 4–15)
MV MEAN GRADIENT: 1 MMHG
MV PEAK A VEL: 0.56 M/S
MV PEAK E VEL: 1.05 M/S
MV PEAK GRADIENT: 5 MMHG
MV STENOSIS PRESSURE HALF TIME: 98.56 MS
MV VALVE AREA BY CONTINUITY EQUATION: 1.65 CM2
MV VALVE AREA P 1/2 METHOD: 2.23 CM2
NEUTROPHILS # BLD AUTO: 7.8 K/UL (ref 1.8–7.7)
NEUTROPHILS NFR BLD: 65.1 % (ref 38–73)
NRBC BLD-RTO: 0 /100 WBC
PISA TR MAX VEL: 2.83 M/S
PLATELET # BLD AUTO: 324 K/UL (ref 150–450)
PMV BLD AUTO: 10.6 FL (ref 9.2–12.9)
POCT GLUCOSE: 149 MG/DL (ref 70–110)
POCT GLUCOSE: 172 MG/DL (ref 70–110)
POCT GLUCOSE: 201 MG/DL (ref 70–110)
POCT GLUCOSE: 218 MG/DL (ref 70–110)
PV PEAK VELOCITY: 0.82 CM/S
RA MAJOR: 4.86 CM
RA PRESSURE: 3 MMHG
RA WIDTH: 4.44 CM
RBC # BLD AUTO: 4.71 M/UL (ref 4–5.4)
RIGHT VENTRICULAR END-DIASTOLIC DIMENSION: 2.97 CM
RV TISSUE DOPPLER FREE WALL SYSTOLIC VELOCITY 1 (APICAL 4 CHAMBER VIEW): 7.61 CM/S
TDI LATERAL: 0.06 M/S
TDI SEPTAL: 0.03 M/S
TDI: 0.05 M/S
TR MAX PG: 32 MMHG
TRICUSPID ANNULAR PLANE SYSTOLIC EXCURSION: 1.17 CM
TV REST PULMONARY ARTERY PRESSURE: 35 MMHG
WBC # BLD AUTO: 11.95 K/UL (ref 3.9–12.7)

## 2022-02-14 PROCEDURE — 25000003 PHARM REV CODE 250: Performed by: INTERNAL MEDICINE

## 2022-02-14 PROCEDURE — 36415 COLL VENOUS BLD VENIPUNCTURE: CPT | Performed by: INTERNAL MEDICINE

## 2022-02-14 PROCEDURE — 85025 COMPLETE CBC W/AUTO DIFF WBC: CPT | Performed by: INTERNAL MEDICINE

## 2022-02-14 PROCEDURE — 11000001 HC ACUTE MED/SURG PRIVATE ROOM

## 2022-02-14 PROCEDURE — 63600175 PHARM REV CODE 636 W HCPCS: Performed by: NURSE PRACTITIONER

## 2022-02-14 PROCEDURE — 94618 PULMONARY STRESS TESTING: CPT

## 2022-02-14 PROCEDURE — 25000003 PHARM REV CODE 250: Performed by: NURSE PRACTITIONER

## 2022-02-14 PROCEDURE — 85379 FIBRIN DEGRADATION QUANT: CPT | Performed by: INTERNAL MEDICINE

## 2022-02-14 RX ORDER — MUPIROCIN 20 MG/G
OINTMENT TOPICAL 2 TIMES DAILY
Status: DISCONTINUED | OUTPATIENT
Start: 2022-02-14 | End: 2022-02-15 | Stop reason: HOSPADM

## 2022-02-14 RX ORDER — AMLODIPINE BESYLATE 5 MG/1
5 TABLET ORAL DAILY
Status: DISCONTINUED | OUTPATIENT
Start: 2022-02-14 | End: 2022-02-15 | Stop reason: HOSPADM

## 2022-02-14 RX ADMIN — CLONAZEPAM 1 MG: 0.5 TABLET ORAL at 08:02

## 2022-02-14 RX ADMIN — ENOXAPARIN SODIUM 40 MG: 100 INJECTION SUBCUTANEOUS at 04:02

## 2022-02-14 RX ADMIN — FENOFIBRATE 145 MG: 145 TABLET, FILM COATED ORAL at 09:02

## 2022-02-14 RX ADMIN — AMLODIPINE BESYLATE 5 MG: 5 TABLET ORAL at 11:02

## 2022-02-14 RX ADMIN — DOCUSATE SODIUM AND SENNOSIDES 1 TABLET: 8.6; 5 TABLET, FILM COATED ORAL at 09:02

## 2022-02-14 RX ADMIN — FUROSEMIDE 40 MG: 10 INJECTION, SOLUTION INTRAMUSCULAR; INTRAVENOUS at 04:02

## 2022-02-14 RX ADMIN — ASPIRIN 81 MG: 81 TABLET, COATED ORAL at 09:02

## 2022-02-14 RX ADMIN — METOPROLOL SUCCINATE 100 MG: 50 TABLET, EXTENDED RELEASE ORAL at 09:02

## 2022-02-14 RX ADMIN — ESCITALOPRAM OXALATE 10 MG: 10 TABLET, FILM COATED ORAL at 09:02

## 2022-02-14 RX ADMIN — MUPIROCIN: 20 OINTMENT TOPICAL at 11:02

## 2022-02-14 RX ADMIN — ATORVASTATIN CALCIUM 20 MG: 20 TABLET, FILM COATED ORAL at 09:02

## 2022-02-14 RX ADMIN — LISINOPRIL 10 MG: 10 TABLET ORAL at 09:02

## 2022-02-14 RX ADMIN — CLOPIDOGREL 75 MG: 75 TABLET, FILM COATED ORAL at 09:02

## 2022-02-14 RX ADMIN — DOCUSATE SODIUM AND SENNOSIDES 1 TABLET: 8.6; 5 TABLET, FILM COATED ORAL at 08:02

## 2022-02-14 RX ADMIN — ESCITALOPRAM OXALATE 10 MG: 10 TABLET, FILM COATED ORAL at 08:02

## 2022-02-14 NOTE — PROGRESS NOTES
[facility] Hospital Medicine  Progress Note    Patient Name: Celine Steen  MRN: [id number]  Patient Class: IP- Inpatient   Admission Date: 2/13/2022  Length of Stay: 1 days  Attending Physician: Sonia Hsieh MD  Primary Care Provider: Josseline Vargas NP        Subjective:     Principal Problem:Acute exacerbation of CHF (congestive heart failure)        HPI:  Celine Steen is a 66 year old female with a past medical history of HTN, HLD, DM, CAD, anxiety and past surgical history of CABG and defibrillator placement, who presented to the ED with a complaint of sudden onset SOB. She states she was diagnosed with covid on 1/27/22, and while she remained essentially asymptomatic, she states she has had more fatigue and sleeping more than normal. This Friday she went out shopping with no issue, but when she got home, felt very fatigued and SOB. She laid down for a bit and the symptoms resolved. She states today the same thing happened to her, but significantly worse. She states that she has been diagnosed with CHF in the past, but does not know much about her diagnosis. She denies chest pain, nausea, vomiting, diarrhea, fever, chills, lightheadedness, abdominal pain or other complaint.    Upon arrival to the ER, the patient was in severe respiratory distress and very hypertensive. She arrived on CPAP and was converted to BiPAP. She was initiated on a Tridil drip, but that was dc'ed shortly after starting. CXR showed bilateral pulmonary edema/congestion, and she was given Lasix 80 mg IV. She began to diurese and became less short of breath. She was taken off BiPAP and placed on 4L O2 via NC, tolerating with no issue or distress with an oxygen saturation of %. BNP level was 115. Troponin negative. CMP showed a potassium of 5.3, glucose of 267 and anion gap of 21. ABG showed pH of 7.296, pCO2 49.3, pO2 361 and HCO3 24. Pending beta hydroxybutyrate level. CBC showed an elevated WBC of  17.78, no evidence of infection seen, pending procalcitonin level. Hospital Medicine consulted for admission and further management.       Overview/Hospital Course:  No notes on file    Interval History: Shortness of breath improved.  Currently on IV Lasix patient denies any new symptoms no acute events overnight    Review of Systems   Constitutional: Positive for fatigue. Negative for chills and fever.   HENT: Negative for congestion and sore throat.    Eyes: Negative for visual disturbance.   Respiratory: Negative for cough, shortness of breath and wheezing.    Cardiovascular: Negative for chest pain, palpitations and leg swelling.   Gastrointestinal: Negative for abdominal pain, nausea and vomiting.   Endocrine: Negative for cold intolerance and heat intolerance.   Genitourinary: Negative for dysuria and hematuria.   Musculoskeletal: Negative for arthralgias and myalgias.   Skin: Negative for pallor and rash.   Neurological: Positive for weakness. Negative for tremors and seizures.   Hematological: Negative for adenopathy. Does not bruise/bleed easily.   Psychiatric/Behavioral: Negative for hallucinations.   All other systems reviewed and are negative.    Objective:     Vital Signs (Most Recent):  Temp: 96.8 °F (36 °C) (02/14/22 1128)  Pulse: 70 (02/14/22 1128)  Resp: 16 (02/14/22 1128)  BP: 132/61 (02/14/22 1128)  SpO2: (!) 93 % (02/14/22 1128) Vital Signs (24h Range):  Temp:  [96.4 °F (35.8 °C)-98.4 °F (36.9 °C)] 96.8 °F (36 °C)  Pulse:  [64-73] 70  Resp:  [16-18] 16  SpO2:  [91 %-96 %] 93 %  BP: (125-175)/(56-79) 132/61     Weight: 76.7 kg (169 lb)  Body mass index is 30.91 kg/m².    Intake/Output Summary (Last 24 hours) at 2/14/2022 1431  Last data filed at 2/14/2022 0600  Gross per 24 hour   Intake 450 ml   Output 3000 ml   Net -2550 ml      Physical Exam  Constitutional:       General: She is awake. She is not in acute distress.     Appearance: Normal appearance. She is well-developed. She is not  ill-appearing (chronically ill and elderly ).   HENT:      Head: Normocephalic and atraumatic.      Mouth/Throat:      Lips: Pink.      Mouth: Mucous membranes are moist.      Dentition: Abnormal dentition.   Eyes:      Conjunctiva/sclera: Conjunctivae normal.      Pupils: Pupils are equal, round, and reactive to light.   Neck:      Thyroid: No thyromegaly.      Vascular: No JVD.   Cardiovascular:      Rate and Rhythm: Normal rate and regular rhythm.      Heart sounds: Normal heart sounds, S1 normal and S2 normal. No murmur heard.  No friction rub. No gallop.    Pulmonary:      Effort: Pulmonary effort is normal.      Breath sounds: No rales.   Chest:       Abdominal:      General: Bowel sounds are normal. There is no distension.      Palpations: Abdomen is soft. There is no mass.      Tenderness: There is no abdominal tenderness.   Musculoskeletal:         General: Normal range of motion.      Cervical back: Neck supple.      Right lower le+ Pitting Edema present.      Left lower le+ Pitting Edema present.   Skin:     General: Skin is warm and dry.      Capillary Refill: Capillary refill takes less than 2 seconds.   Neurological:      General: No focal deficit present.      Mental Status: She is alert and oriented to person, place, and time. Mental status is at baseline.      Cranial Nerves: No cranial nerve deficit.      Sensory: Sensation is intact.      Motor: Motor function is intact.   Psychiatric:         Behavior: Behavior normal. Behavior is cooperative.         Significant Labs:   All pertinent labs within the past 24 hours have been reviewed.  CBC:   Recent Labs   Lab 22  0115 22  1208 22  1028   WBC 17.78* 16.04* 11.95   HGB 13.5 12.3 12.7   HCT 44.0 39.1 41.0    346 324     CMP:   Recent Labs   Lab 22  0115 22  1208   * 138   K 5.3* 4.4   CL 99 97   CO2 15* 29   * 144*   BUN 20 26*   CREATININE 1.1 0.8   CALCIUM 9.8 9.9   PROT 8.7* 7.6   ALBUMIN  3.6 3.5   BILITOT 0.3 0.4   ALKPHOS 50* 50*   AST 52* 26   ALT 37 33   ANIONGAP 21* 12   EGFRNONAA 52* >60       Significant Imaging: I have reviewed all pertinent imaging results/findings within the past 24 hours.      Assessment/Plan:      * Acute exacerbation of CHF (congestive heart failure)  Patient is identified as having Systolic (HFrEF) heart failure that is Acute on chronic. CHF is currently uncontrolled due to Continued edema of extremities, Rales/crackles on pulmonary exam and Pulmonary edema/pleural effusion on CXR. Latest ECHO performed and demonstrates- No results found for this or any previous visit.  . Continue Beta Blocker and Furosemide and monitor clinical status closely. Monitor on telemetry. Patient is on CHF pathway.  Monitor strict Is&Os and daily weights.  Place on fluid restriction of 1.5 L. Continue to stress to patient importance of self efficacy and  on diet for CHF. Last BNP reviewed- and noted below   Recent Labs   Lab 02/13/22  0115   *   .  Results for orders placed during the hospital encounter of 02/13/22    Echo    Interpretation Summary  · The estimated ejection fraction is 35%.  · The left ventricle is normal in size with mild concentric hypertrophy and moderately decreased systolic function.  · Grade II left ventricular diastolic dysfunction.  · Moderate left atrial enlargement.  · There is moderate left ventricular global hypokinesis.  · Mild right atrial enlargement.  · Mild mitral regurgitation.  · Mild tricuspid regurgitation.      ICD (implantable cardioverter-defibrillator) in place  Noted, continue tele      Bipolar 1 disorder    Noted, continue home meds    Type 2 diabetes mellitus without complication  Patient's FSGs are uncontrolled due to hyperglycemia on current medication regimen.  Last A1c reviewed-   Lab Results   Component Value Date    HGBA1C 7.0 (H) 02/13/2022     Most recent fingerstick glucose reviewed-   Recent Labs   Lab 02/13/22  6538  02/13/22 2025 02/14/22  0729 02/14/22  1135   POCTGLUCOSE 191* 187* 149* 172*     Current correctional scale  Medium  Maintain anti-hyperglycemic dose as follows-   Antihyperglycemics (From admission, onward)            Start     Stop Route Frequency Ordered    02/13/22 0443  insulin aspart U-100 pen 1-10 Units         -- SubQ Before meals & nightly PRN 02/13/22 0343        Hold Oral hypoglycemics while patient is in the hospital.     Pending Beta hydroxybutyrate level        Essential hypertension  Chronic, controlled.  Latest blood pressure and vitals reviewed-   Temp:  [96.4 °F (35.8 °C)-98.4 °F (36.9 °C)]   Pulse:  [64-73]   Resp:  [16-18]   BP: (125-175)/(56-79)   SpO2:  [91 %-96 %] .   Home meds for hypertension were reviewed and noted below.   Hypertension Medications             furosemide (LASIX) 40 MG tablet Take 0.5 tablets (20 mg total) by mouth daily as needed.    lisinopril 10 MG tablet TAKE 1 TABLET BY MOUTH EVERY DAY    metoprolol succinate (TOPROL-XL) 100 MG 24 hr tablet TAKE 1 TABLET BY MOUTH EVERY DAY    nitroGLYCERIN (NITROSTAT) 0.3 MG SL tablet Place 1 tablet (0.3 mg total) under the tongue every 5 (five) minutes as needed for Chest pain.          While in the hospital, will manage blood pressure as follows; Continue home antihypertensive regimen    Will utilize p.r.n. blood pressure medication only if patient's blood pressure greater than  180/110 and she develops symptoms such as worsening chest pain or shortness of breath.        CAD (coronary artery disease)  Patient with known CAD s/p CABG, which is controlled Will continue ASA, Plavix and Statin and monitor for S/Sx of angina/ACS. Continue to monitor on telemetry.           VTE Risk Mitigation (From admission, onward)         Ordered     enoxaparin injection 40 mg  Daily         02/13/22 0340     IP VTE HIGH RISK PATIENT  Once         02/13/22 0340     Place sequential compression device  Until discontinued         02/13/22 0340                 Discharge Planning   CRISTHIAN:      Code Status: Full Code   Is the patient medically ready for discharge?:     Reason for patient still in hospital (select all that apply): Patient trending condition and Treatment  Discharge Plan A: Home with family                  Sonia Hsieh MD  Department of Hospital Medicine   [facility]

## 2022-02-14 NOTE — CARE UPDATE
02/14/22 1050   Patient Assessment/Suction   Level of Consciousness (AVPU) alert   Respiratory Effort Normal;Unlabored   Home Oxygen Qualification   $ Home O2 Qualification Pulmonary Stress Test/6 min walk   Room Air SpO2 At Rest (!) 88 %   Heart Rate on O2 63 bpm   Post Ambulation O2 LPM 2 LPM   Home O2 Eval Comments Failed home O2 Eval

## 2022-02-14 NOTE — PROGRESS NOTES
Food & Nutrition                                                           Education     Diet Education: Heart Failure Nutrition Therapy   Time Spent: 10 minutes  Learners: Patient         Nutrition Education provided with handouts: Heart Failure Nutrition Therapy         Comments: I educated the patient on Heart failure Nutrition therapy.  Pt was attentive and seemed interested in the topic.  We discussed how to follow a low sodium diet: foods to avoid, eating out, salting foods/salt substitutes, and label reading.   We discussed fluid restriction.  Pt stated that the fluid restriction was the hardest part for her.  Pt stated that her mouth is often dry.  Suggestion of sucking on ice cubes or frozen grapes was given.  I educated pt on the importance of weighing herself often.  Pt seemed to understand the information provided.      NFPE not done, Pt appears well nourished.    All questions and concerns answered. Dietitian's contact information provided.        Follow-Up: yes     Please Re-consult as needed

## 2022-02-14 NOTE — PLAN OF CARE
Patient not medically clear, on 2L EF 35%, BLE edema 2+, discharge plan is home Tuesday.          CM will continue to follow.

## 2022-02-14 NOTE — ASSESSMENT & PLAN NOTE
Patient is identified as having Systolic (HFrEF) heart failure that is Acute on chronic. CHF is currently uncontrolled due to Continued edema of extremities, Rales/crackles on pulmonary exam and Pulmonary edema/pleural effusion on CXR. Latest ECHO performed and demonstrates- No results found for this or any previous visit.  . Continue Beta Blocker and Furosemide and monitor clinical status closely. Monitor on telemetry. Patient is on CHF pathway.  Monitor strict Is&Os and daily weights.  Place on fluid restriction of 1.5 L. Continue to stress to patient importance of self efficacy and  on diet for CHF. Last BNP reviewed- and noted below   Recent Labs   Lab 02/13/22  0115   *   .  Results for orders placed during the hospital encounter of 02/13/22    Echo    Interpretation Summary  · The estimated ejection fraction is 35%.  · The left ventricle is normal in size with mild concentric hypertrophy and moderately decreased systolic function.  · Grade II left ventricular diastolic dysfunction.  · Moderate left atrial enlargement.  · There is moderate left ventricular global hypokinesis.  · Mild right atrial enlargement.  · Mild mitral regurgitation.  · Mild tricuspid regurgitation.

## 2022-02-14 NOTE — SUBJECTIVE & OBJECTIVE
Interval History: Shortness of breath improved.  Currently on IV Lasix patient denies any new symptoms no acute events overnight    Review of Systems   Constitutional: Positive for fatigue. Negative for chills and fever.   HENT: Negative for congestion and sore throat.    Eyes: Negative for visual disturbance.   Respiratory: Negative for cough, shortness of breath and wheezing.    Cardiovascular: Negative for chest pain, palpitations and leg swelling.   Gastrointestinal: Negative for abdominal pain, nausea and vomiting.   Endocrine: Negative for cold intolerance and heat intolerance.   Genitourinary: Negative for dysuria and hematuria.   Musculoskeletal: Negative for arthralgias and myalgias.   Skin: Negative for pallor and rash.   Neurological: Positive for weakness. Negative for tremors and seizures.   Hematological: Negative for adenopathy. Does not bruise/bleed easily.   Psychiatric/Behavioral: Negative for hallucinations.   All other systems reviewed and are negative.    Objective:     Vital Signs (Most Recent):  Temp: 96.8 °F (36 °C) (02/14/22 1128)  Pulse: 70 (02/14/22 1128)  Resp: 16 (02/14/22 1128)  BP: 132/61 (02/14/22 1128)  SpO2: (!) 93 % (02/14/22 1128) Vital Signs (24h Range):  Temp:  [96.4 °F (35.8 °C)-98.4 °F (36.9 °C)] 96.8 °F (36 °C)  Pulse:  [64-73] 70  Resp:  [16-18] 16  SpO2:  [91 %-96 %] 93 %  BP: (125-175)/(56-79) 132/61     Weight: 76.7 kg (169 lb)  Body mass index is 30.91 kg/m².    Intake/Output Summary (Last 24 hours) at 2/14/2022 1431  Last data filed at 2/14/2022 0600  Gross per 24 hour   Intake 450 ml   Output 3000 ml   Net -2550 ml      Physical Exam  Constitutional:       General: She is awake. She is not in acute distress.     Appearance: Normal appearance. She is well-developed. She is not ill-appearing (chronically ill and elderly ).   HENT:      Head: Normocephalic and atraumatic.      Mouth/Throat:      Lips: Pink.      Mouth: Mucous membranes are moist.      Dentition: Abnormal  dentition.   Eyes:      Conjunctiva/sclera: Conjunctivae normal.      Pupils: Pupils are equal, round, and reactive to light.   Neck:      Thyroid: No thyromegaly.      Vascular: No JVD.   Cardiovascular:      Rate and Rhythm: Normal rate and regular rhythm.      Heart sounds: Normal heart sounds, S1 normal and S2 normal. No murmur heard.  No friction rub. No gallop.    Pulmonary:      Effort: Pulmonary effort is normal.      Breath sounds: No rales.   Chest:       Abdominal:      General: Bowel sounds are normal. There is no distension.      Palpations: Abdomen is soft. There is no mass.      Tenderness: There is no abdominal tenderness.   Musculoskeletal:         General: Normal range of motion.      Cervical back: Neck supple.      Right lower le+ Pitting Edema present.      Left lower le+ Pitting Edema present.   Skin:     General: Skin is warm and dry.      Capillary Refill: Capillary refill takes less than 2 seconds.   Neurological:      General: No focal deficit present.      Mental Status: She is alert and oriented to person, place, and time. Mental status is at baseline.      Cranial Nerves: No cranial nerve deficit.      Sensory: Sensation is intact.      Motor: Motor function is intact.   Psychiatric:         Behavior: Behavior normal. Behavior is cooperative.         Significant Labs:   All pertinent labs within the past 24 hours have been reviewed.  CBC:   Recent Labs   Lab 22  0115 22  1208 22  1028   WBC 17.78* 16.04* 11.95   HGB 13.5 12.3 12.7   HCT 44.0 39.1 41.0    346 324     CMP:   Recent Labs   Lab 22  0115 22  1208   * 138   K 5.3* 4.4   CL 99 97   CO2 15* 29   * 144*   BUN 20 26*   CREATININE 1.1 0.8   CALCIUM 9.8 9.9   PROT 8.7* 7.6   ALBUMIN 3.6 3.5   BILITOT 0.3 0.4   ALKPHOS 50* 50*   AST 52* 26   ALT 37 33   ANIONGAP 21* 12   EGFRNONAA 52* >60       Significant Imaging: I have reviewed all pertinent imaging results/findings within  the past 24 hours.

## 2022-02-14 NOTE — ASSESSMENT & PLAN NOTE
Patient's FSGs are uncontrolled due to hyperglycemia on current medication regimen.  Last A1c reviewed-   Lab Results   Component Value Date    HGBA1C 7.0 (H) 02/13/2022     Most recent fingerstick glucose reviewed-   Recent Labs   Lab 02/13/22  1652 02/13/22 2025 02/14/22  0729 02/14/22  1135   POCTGLUCOSE 191* 187* 149* 172*     Current correctional scale  Medium  Maintain anti-hyperglycemic dose as follows-   Antihyperglycemics (From admission, onward)            Start     Stop Route Frequency Ordered    02/13/22 0443  insulin aspart U-100 pen 1-10 Units         -- SubQ Before meals & nightly PRN 02/13/22 0343        Hold Oral hypoglycemics while patient is in the hospital.     Pending Beta hydroxybutyrate level

## 2022-02-14 NOTE — ASSESSMENT & PLAN NOTE
Chronic, controlled.  Latest blood pressure and vitals reviewed-   Temp:  [96.4 °F (35.8 °C)-98.4 °F (36.9 °C)]   Pulse:  [64-73]   Resp:  [16-18]   BP: (125-175)/(56-79)   SpO2:  [91 %-96 %] .   Home meds for hypertension were reviewed and noted below.   Hypertension Medications             furosemide (LASIX) 40 MG tablet Take 0.5 tablets (20 mg total) by mouth daily as needed.    lisinopril 10 MG tablet TAKE 1 TABLET BY MOUTH EVERY DAY    metoprolol succinate (TOPROL-XL) 100 MG 24 hr tablet TAKE 1 TABLET BY MOUTH EVERY DAY    nitroGLYCERIN (NITROSTAT) 0.3 MG SL tablet Place 1 tablet (0.3 mg total) under the tongue every 5 (five) minutes as needed for Chest pain.          While in the hospital, will manage blood pressure as follows; Continue home antihypertensive regimen    Will utilize p.r.n. blood pressure medication only if patient's blood pressure greater than  180/110 and she develops symptoms such as worsening chest pain or shortness of breath.

## 2022-02-15 ENCOUNTER — TELEPHONE (OUTPATIENT)
Dept: FAMILY MEDICINE | Facility: CLINIC | Age: 67
End: 2022-02-15
Payer: MEDICARE

## 2022-02-15 VITALS
HEIGHT: 62 IN | WEIGHT: 170 LBS | HEART RATE: 62 BPM | BODY MASS INDEX: 31.28 KG/M2 | SYSTOLIC BLOOD PRESSURE: 118 MMHG | OXYGEN SATURATION: 93 % | RESPIRATION RATE: 16 BRPM | DIASTOLIC BLOOD PRESSURE: 56 MMHG | TEMPERATURE: 97 F

## 2022-02-15 LAB
ANION GAP SERPL CALC-SCNC: 8 MMOL/L (ref 8–16)
BUN SERPL-MCNC: 30 MG/DL (ref 8–23)
CALCIUM SERPL-MCNC: 9.3 MG/DL (ref 8.7–10.5)
CHLORIDE SERPL-SCNC: 95 MMOL/L (ref 95–110)
CO2 SERPL-SCNC: 35 MMOL/L (ref 23–29)
CREAT SERPL-MCNC: 0.9 MG/DL (ref 0.5–1.4)
EST. GFR  (AFRICAN AMERICAN): >60 ML/MIN/1.73 M^2
EST. GFR  (NON AFRICAN AMERICAN): >60 ML/MIN/1.73 M^2
GLUCOSE SERPL-MCNC: 219 MG/DL (ref 70–110)
POCT GLUCOSE: 131 MG/DL (ref 70–110)
POCT GLUCOSE: 165 MG/DL (ref 70–110)
POCT GLUCOSE: 179 MG/DL (ref 70–110)
POTASSIUM SERPL-SCNC: 4.7 MMOL/L (ref 3.5–5.1)
SODIUM SERPL-SCNC: 138 MMOL/L (ref 136–145)

## 2022-02-15 PROCEDURE — 25000003 PHARM REV CODE 250: Performed by: NURSE PRACTITIONER

## 2022-02-15 PROCEDURE — 80048 BASIC METABOLIC PNL TOTAL CA: CPT | Performed by: INTERNAL MEDICINE

## 2022-02-15 PROCEDURE — 36415 COLL VENOUS BLD VENIPUNCTURE: CPT | Performed by: INTERNAL MEDICINE

## 2022-02-15 PROCEDURE — 99900031 HC PATIENT EDUCATION (STAT)

## 2022-02-15 PROCEDURE — 25000003 PHARM REV CODE 250: Performed by: INTERNAL MEDICINE

## 2022-02-15 PROCEDURE — 63600175 PHARM REV CODE 636 W HCPCS: Performed by: NURSE PRACTITIONER

## 2022-02-15 RX ORDER — AMLODIPINE BESYLATE 5 MG/1
5 TABLET ORAL DAILY
Qty: 30 TABLET | Refills: 11 | Status: SHIPPED | OUTPATIENT
Start: 2022-02-16 | End: 2023-02-17 | Stop reason: SDUPTHER

## 2022-02-15 RX ORDER — FUROSEMIDE 40 MG/1
40 TABLET ORAL 2 TIMES DAILY
Qty: 180 TABLET | Refills: 0
Start: 2022-02-15 | End: 2022-02-23 | Stop reason: SDUPTHER

## 2022-02-15 RX ORDER — FUROSEMIDE 40 MG/1
40 TABLET ORAL 2 TIMES DAILY
Status: DISCONTINUED | OUTPATIENT
Start: 2022-02-15 | End: 2022-02-15 | Stop reason: HOSPADM

## 2022-02-15 RX ADMIN — LISINOPRIL 10 MG: 10 TABLET ORAL at 09:02

## 2022-02-15 RX ADMIN — AMLODIPINE BESYLATE 5 MG: 5 TABLET ORAL at 09:02

## 2022-02-15 RX ADMIN — FUROSEMIDE 40 MG: 10 INJECTION, SOLUTION INTRAMUSCULAR; INTRAVENOUS at 04:02

## 2022-02-15 RX ADMIN — ESCITALOPRAM OXALATE 10 MG: 10 TABLET, FILM COATED ORAL at 09:02

## 2022-02-15 RX ADMIN — ASPIRIN 81 MG: 81 TABLET, COATED ORAL at 09:02

## 2022-02-15 RX ADMIN — CLOPIDOGREL 75 MG: 75 TABLET, FILM COATED ORAL at 09:02

## 2022-02-15 RX ADMIN — METOPROLOL SUCCINATE 100 MG: 50 TABLET, EXTENDED RELEASE ORAL at 09:02

## 2022-02-15 RX ADMIN — ATORVASTATIN CALCIUM 20 MG: 20 TABLET, FILM COATED ORAL at 09:02

## 2022-02-15 RX ADMIN — DOCUSATE SODIUM AND SENNOSIDES 1 TABLET: 8.6; 5 TABLET, FILM COATED ORAL at 09:02

## 2022-02-15 RX ADMIN — FENOFIBRATE 145 MG: 145 TABLET, FILM COATED ORAL at 09:02

## 2022-02-15 RX ADMIN — MUPIROCIN: 20 OINTMENT TOPICAL at 09:02

## 2022-02-15 NOTE — PLAN OF CARE
Problem: Infection  Goal: Absence of Infection Signs and Symptoms  Outcome: Met     Problem: Adult Inpatient Plan of Care  Goal: Plan of Care Review  Outcome: Met  Goal: Patient-Specific Goal (Individualized)  Outcome: Met  Goal: Absence of Hospital-Acquired Illness or Injury  Outcome: Met  Goal: Optimal Comfort and Wellbeing  Outcome: Met  Goal: Readiness for Transition of Care  Outcome: Met     Problem: Diabetes Comorbidity  Goal: Blood Glucose Level Within Targeted Range  Outcome: Met

## 2022-02-15 NOTE — PLAN OF CARE
Pt in bed no s/s of distress. Pt has no complaints of sob. Perez in placed. Pt possible d/c tomorrow. Pt call light in place bed in lowest position call light in reach.

## 2022-02-15 NOTE — PLAN OF CARE
Patient is clear for discharge from case management standpoint AFTER respiratory O2 for home use teaching.  PCP follow up scheduled, AVS updated.  Oxygen (1 E Tank Setup) delivered to the patients room, patient signed.         02/15/22 1536   Final Note   Assessment Type Final Discharge Note   Anticipated Discharge Disposition Home   What phone number can be called within the next 1-3 days to see how you are doing after discharge? [phone]   Hospital Resources/Appts/Education Provided Appointments scheduled and added to AVS   Post-Acute Status   Post-Acute Authorization [facility] Status Set-up Complete/Auth obtained  (Oxygen delivered to the patients room, patient signed)   Discharge Delays None known at this time

## 2022-02-15 NOTE — DISCHARGE SUMMARY
[facility] Hospital Medicine  Discharge Summary      Patient Name: Celine Steen  MRN: [id number]  Patient Class: IP- Inpatient  Admission Date: 2/13/2022  Hospital Length of Stay: 2 days  Discharge Date and Time:  02/15/2022 3:53 PM  Attending Physician: Sonia Hsieh MD   Discharging Provider: Sonia Hsieh MD  Primary Care Provider: Josseline Vargas NP      HPI:   Celine Steen is a 66 year old female with a past medical history of HTN, HLD, DM, CAD, anxiety and past surgical history of CABG and defibrillator placement, who presented to the ED with a complaint of sudden onset SOB. She states she was diagnosed with covid on 1/27/22, and while she remained essentially asymptomatic, she states she has had more fatigue and sleeping more than normal. This Friday she went out shopping with no issue, but when she got home, felt very fatigued and SOB. She laid down for a bit and the symptoms resolved. She states today the same thing happened to her, but significantly worse. She states that she has been diagnosed with CHF in the past, but does not know much about her diagnosis. She denies chest pain, nausea, vomiting, diarrhea, fever, chills, lightheadedness, abdominal pain or other complaint.    Upon arrival to the ER, the patient was in severe respiratory distress and very hypertensive. She arrived on CPAP and was converted to BiPAP. She was initiated on a Tridil drip, but that was dc'ed shortly after starting. CXR showed bilateral pulmonary edema/congestion, and she was given Lasix 80 mg IV. She began to diurese and became less short of breath. She was taken off BiPAP and placed on 4L O2 via NC, tolerating with no issue or distress with an oxygen saturation of %. BNP level was 115. Troponin negative. CMP showed a potassium of 5.3, glucose of 267 and anion gap of 21. ABG showed pH of 7.296, pCO2 49.3, pO2 361 and HCO3 24. Pending beta hydroxybutyrate level. CBC showed an  elevated WBC of 17.78, no evidence of infection seen, pending procalcitonin level. Hospital Medicine consulted for admission and further management.       * No surgery found *      Hospital Course:   Patient was admitted the CHF pathway Patient was given IV lasix and intake and output closely monitored. ECHO was ordered results below. Patient's renal panel and electrolytes closely monitored. Patient had daily weights. Patient was educated on monitoring daily weight, following low salt diet and in case if gain more than 3 pounds in 24 hours, to call their doctor for further advice. Patient's symptoms resolved.  Patient was transitioned to Lasix twice a day.  Patient was medically stable for discharge with outpatient follow-up with Cardiology     Results for orders placed during the hospital encounter of 02/13/22    Echo    Interpretation Summary  · The estimated ejection fraction is 35%.  · The left ventricle is normal in size with mild concentric hypertrophy and moderately decreased systolic function.  · Grade II left ventricular diastolic dysfunction.  · Moderate left atrial enlargement.  · There is moderate left ventricular global hypokinesis.  · Mild right atrial enlargement.  · Mild mitral regurgitation.  · Mild tricuspid regurgitation.         Goals of Care Treatment Preferences:  Code Status: Full Code      Consults:   Consults (From admission, onward)        Status Ordering Provider     Inpatient consult to Social Work/Case Management  Once        Provider:  (Not yet assigned)    Completed FOREST MOULTON     Inpatient consult to Registered Dietitian/Nutritionist  Once        Provider:  (Not yet assigned)    Completed FOREST MOULTON.          No new Assessment & Plan notes have been filed under this hospital service since the last note was generated.  Service: Hospital Medicine    Final Active Diagnoses:    Diagnosis Date Noted POA    PRINCIPAL PROBLEM:  Acute exacerbation of CHF (congestive heart failure)  "[I50.9] 02/13/2022 Yes    ICD (implantable cardioverter-defibrillator) in place [Z95.810] 08/11/2016 Yes    Bipolar 1 disorder [F31.9] 12/11/2012 Yes    Type 2 diabetes mellitus without complication [E11.9] 02/01/2012 Yes    CAD (coronary artery disease) [I25.10]  Yes    Essential hypertension [I10]  Yes      Problems Resolved During this Admission:    Diagnosis Date Noted Date Resolved POA    Metabolic acidosis with respiratory acidosis [E87.4] 02/13/2022 02/14/2022 Yes    Leukocytosis [D72.829] 02/13/2022 02/14/2022 Yes    Hyperkalemia [E87.5] 02/13/2022 02/14/2022 Yes       Discharged Condition: stable    Disposition: Home or Self Care    Follow Up:   Follow-up Information     Mary Kate Fulton NP On 2/23/2022.    Specialty: Family Medicine  Why: Hospital Follow Up at 1:00 pm  Contact information:  [address] [facility] [address]  [phone]                       Patient Instructions:      OXYGEN FOR HOME USE     Order Specific Question Answer Comments   Liter Flow 2    Duration Continuous    Qualifying Test Performed at: Rest    Oxygen saturation: 88    Portable mode: continuous    Route nasal cannula    Device: home concentrator with portable concentrator    Length of need (in months): 99 mos    Patient condition with qualifying saturation CHF    Height: 5' 2" (1.575 m)    Weight: 77.1 kg (169 lb 15.6 oz)    Alternative treatment measures have been tried or considered and deemed clinically ineffective. Yes      Ambulatory referral/consult to Cardiology   Standing Status: Future   Referral Priority: Routine Referral Type: Consultation   Referral Reason: Specialty Services Required   Requested Specialty: Cardiology   Number of Visits Requested: 1     Diet Cardiac     Activity as tolerated       Significant Diagnostic Studies: Labs:   BMP:   Recent Labs   Lab 02/15/22  0956   *      K 4.7   CL 95   CO2 35*   BUN 30*   CREATININE 0.9   CALCIUM 9.3    and CBC   Recent Labs   Lab " 02/14/22  1028   WBC 11.95   HGB 12.7   HCT 41.0        Microbiology: Blood Culture No results found for: LABBLOO, Sputum Culture No results found for: GSRESP, RESPIRATORYC and Urine Culture  No results found for: LABURIN    Pending Diagnostic Studies:     None         Medications:  Reconciled Home Medications:      Medication List      START taking these medications    amLODIPine 5 MG tablet  Commonly known as: NORVASC  Take 1 tablet (5 mg total) by mouth once daily.  Start taking on: February 16, 2022        CHANGE how you take these medications    furosemide 40 MG tablet  Commonly known as: LASIX  Take 1 tablet (40 mg total) by mouth 2 (two) times a day.  What changed:   · how much to take  · when to take this  · reasons to take this        CONTINUE taking these medications    aspirin 81 MG EC tablet  Commonly known as: ECOTRIN  Take 81 mg by mouth once daily.     cetirizine 10 MG tablet  Commonly known as: ZYRTEC  Take 1 tablet (10 mg total) by mouth once daily.     clonazePAM 1 MG tablet  Commonly known as: KlonoPIN  Take 1 mg by mouth daily as needed.     CO Q-10 200 mg capsule  Generic drug: coenzyme Q10  Take 200 mg by mouth once daily.     EScitalopram oxalate 20 MG tablet  Commonly known as: LEXAPRO  TAKE 1/2 TABLET BY MOUTH TWICE DAILY     fenofibrate 145 MG tablet  Commonly known as: TRICOR  TAKE 1 TABLET BY MOUTH EVERY DAY     lisinopriL 10 MG tablet  TAKE 1 TABLET BY MOUTH EVERY DAY     metFORMIN 500 MG tablet  Commonly known as: GLUCOPHAGE  TAKE 1 TABLET BY MOUTH TWICE DAILY     metoprolol succinate 100 MG 24 hr tablet  Commonly known as: TOPROL-XL  TAKE 1 TABLET BY MOUTH EVERY DAY     nicotine 14 mg/24 hr  Commonly known as: NICODERM CQ  Place 1 patch onto the skin once daily. Okay to dispense generic.     nitroGLYCERIN 0.3 MG SL tablet  Commonly known as: NITROSTAT  Place 1 tablet (0.3 mg total) under the tongue every 5 (five) minutes as needed for Chest pain.     oxybutynin 5 MG  Tr24  Commonly known as: DITROPAN-XL  Take 1 tablet (5 mg total) by mouth once daily.     simvastatin 40 MG tablet  Commonly known as: ZOCOR  TAKE 1 TABLET BY MOUTH EVERY EVENING     varenicline 1 mg Tab  Commonly known as: CHANTIX  Take 1 tablet (1 mg total) by mouth 2 (two) times daily.        STOP taking these medications    ALPRAZolam 1 MG tablet  Commonly known as: XANAX     azithromycin 250 MG tablet  Commonly known as: Z-MAG     clopidogreL 75 mg tablet  Commonly known as: PLAVIX     fluticasone propionate 50 mcg/actuation nasal spray  Commonly known as: FLONASE     HYDROcodone-acetaminophen  mg per tablet  Commonly known as: NORCO     SUBOXONE SL            Indwelling Lines/Drains at time of discharge:   Lines/Drains/Airways     None                 Time spent on the discharge of patient: 60 minutes         Sonia Hsieh MD  Department of Hospital Medicine  [facility]

## 2022-02-15 NOTE — HOSPITAL COURSE
Patient was admitted the CHF pathway Patient was given IV lasix and intake and output closely monitored. ECHO was ordered results below. Patient's renal panel and electrolytes closely monitored. Patient had daily weights. Patient was educated on monitoring daily weight, following low salt diet and in case if gain more than 3 pounds in 24 hours, to call their doctor for further advice. Patient's symptoms resolved.  Patient was transitioned to Lasix twice a day.  Patient was medically stable for discharge with outpatient follow-up with Cardiology     Results for orders placed during the hospital encounter of 02/13/22    Echo    Interpretation Summary  · The estimated ejection fraction is 35%.  · The left ventricle is normal in size with mild concentric hypertrophy and moderately decreased systolic function.  · Grade II left ventricular diastolic dysfunction.  · Moderate left atrial enlargement.  · There is moderate left ventricular global hypokinesis.  · Mild right atrial enlargement.  · Mild mitral regurgitation.  · Mild tricuspid regurgitation.

## 2022-02-15 NOTE — NURSING
Orders received to discharge patient home. Discharge education given to include: medications, medication side effects, follow up appointments, IV's removed. Prescription sent to pharmacy. Home oxygen education done by respiratory. Patient verbalized understanding of all discharge care and education provided.

## 2022-02-16 ENCOUNTER — TELEPHONE (OUTPATIENT)
Dept: FAMILY MEDICINE | Facility: CLINIC | Age: 67
End: 2022-02-16
Payer: MEDICARE

## 2022-02-16 NOTE — PHYSICIAN QUERY
PT Name: Celine tSeen  MR #: [id number]     DOCUMENTATION CLARIFICATION     CDS/: ELIANA Maradiaga, RN, CCDS               Contact information: [email or web address]  This form is a permanent document in the medical record.     Query Date: February 16, 2022    By submitting this query, we are merely seeking further clarification of documentation.  Please utilize your independent clinical judgment when addressing the question(s) below.  The Medical Record contains the following   Indicators   Supporting Clinical Findings Location in Medical Record   X SOB, URBAN, Wheezing, Productive Cough, Use of Accessory Muscles, etc. BiPAP in place. Patient with even and unlabored breathing ED Note: HARRIS Becerril 2/13   X RR         ABGs         O2 sat RR: 30  ABG: PCO2 49.3; PO2 361  SpO2: 83%   ED Provider Note: Dr. Prasad 2/13    Hypoxia/Hypercapnia     X BiPAP/Intubation/Mechanical Ventilation presenting to ED via EMS for SOB. Patient currently has CPAP in place. Patient AAOx3 with GCS 15. Patient immediately placed on BIPAP   ED Note: HARRIS Becerril 2/13   X Supplemental O2 she was able to be transferred to 4 L nasal cannula   ED Provider Note: Dr. Prasad 2/13    Home O2, Oxygen Dependence     X Respiratory distress or failure Presenting per EMS in acute respiratory distress.  Accompanying reports state that she had abrupt onset of acute severe shortness of breath at home tonight and the patient endorses it has gradually worsened over last 24 hours   ED Provider Note: Dr. Prasad 2/13    Radiology findings      Acute/Chronic Illness      Treatment      Other         The noted clinical guidelines are only system guidelines and do not replace the providers clinical judgment.    Provider, please specify the respiratory diagnosis associated with above clinical findings.     [  x  ] Acute Respiratory Failure with Hypoxia - ABG pO2 < 60 mmHg or O2 sat of <91% on room air and respiratory symptoms documented   [    ] Acute  Respiratory Failure, unspecified whether with hypoxia or hypercapnia   [    ] Hypoxia Only   [    ] Other Respiratory Diagnosis (please specify): _________________   [   ] Clinically Undetermined       Please document in your progress notes daily for the duration of treatment until resolved and include in your discharge summary.     Reference:    ERINN Lora MD. (2020, March 13). Acute respiratory distress syndrome: Clinical features, diagnosis, and complications in adults ([id number] GINA Hill MD & [id number] LINA Ledesma MD, Eds.). Retrieved November 13, 2020, from [email or web address]-[email or web address]-[email or web address]-features-diagnosis-and-[email or web address]-[email or web address]?search=ards&source=search_result&selectedTitle=1~150&usage_type=default&display_rank=1  Form No. [id number]

## 2022-02-18 ENCOUNTER — TELEPHONE (OUTPATIENT)
Dept: FAMILY MEDICINE | Facility: CLINIC | Age: 67
End: 2022-02-18
Payer: MEDICARE

## 2022-02-23 ENCOUNTER — OFFICE VISIT (OUTPATIENT)
Dept: FAMILY MEDICINE | Facility: CLINIC | Age: 67
End: 2022-02-23
Payer: MEDICARE

## 2022-02-23 VITALS
WEIGHT: 169 LBS | HEIGHT: 62 IN | BODY MASS INDEX: 31.1 KG/M2 | OXYGEN SATURATION: 98 % | TEMPERATURE: 98 F | DIASTOLIC BLOOD PRESSURE: 76 MMHG | HEART RATE: 83 BPM | SYSTOLIC BLOOD PRESSURE: 126 MMHG

## 2022-02-23 DIAGNOSIS — E66.9 DIABETES MELLITUS TYPE 2 IN OBESE: ICD-10-CM

## 2022-02-23 DIAGNOSIS — E11.69 DIABETES MELLITUS TYPE 2 IN OBESE: ICD-10-CM

## 2022-02-23 DIAGNOSIS — E11.59 HYPERTENSION ASSOCIATED WITH TYPE 2 DIABETES MELLITUS: ICD-10-CM

## 2022-02-23 DIAGNOSIS — E11.69 HYPERLIPIDEMIA ASSOCIATED WITH TYPE 2 DIABETES MELLITUS: ICD-10-CM

## 2022-02-23 DIAGNOSIS — Z09 HOSPITAL DISCHARGE FOLLOW-UP: Primary | ICD-10-CM

## 2022-02-23 DIAGNOSIS — I15.2 HYPERTENSION ASSOCIATED WITH TYPE 2 DIABETES MELLITUS: ICD-10-CM

## 2022-02-23 DIAGNOSIS — Z78.9 ON SUPPLEMENTAL OXYGEN BY NASAL CANNULA: ICD-10-CM

## 2022-02-23 DIAGNOSIS — Z95.810 ICD (IMPLANTABLE CARDIOVERTER-DEFIBRILLATOR) IN PLACE: ICD-10-CM

## 2022-02-23 DIAGNOSIS — E11.9 TYPE 2 DIABETES MELLITUS WITHOUT COMPLICATION, WITHOUT LONG-TERM CURRENT USE OF INSULIN: ICD-10-CM

## 2022-02-23 DIAGNOSIS — R53.1 GENERALIZED WEAKNESS: ICD-10-CM

## 2022-02-23 DIAGNOSIS — I50.33 ACUTE ON CHRONIC DIASTOLIC CONGESTIVE HEART FAILURE: ICD-10-CM

## 2022-02-23 DIAGNOSIS — E78.5 HYPERLIPIDEMIA ASSOCIATED WITH TYPE 2 DIABETES MELLITUS: ICD-10-CM

## 2022-02-23 PROCEDURE — 99214 PR OFFICE/OUTPT VISIT, EST, LEVL IV, 30-39 MIN: ICD-10-PCS | Mod: S$PBB,,, | Performed by: NURSE PRACTITIONER

## 2022-02-23 PROCEDURE — 99999 PR PBB SHADOW E&M-EST. PATIENT-LVL V: ICD-10-PCS | Mod: PBBFAC,,, | Performed by: NURSE PRACTITIONER

## 2022-02-23 PROCEDURE — 99215 OFFICE O/P EST HI 40 MIN: CPT | Mod: PBBFAC,PO | Performed by: NURSE PRACTITIONER

## 2022-02-23 PROCEDURE — 99214 OFFICE O/P EST MOD 30 MIN: CPT | Mod: S$PBB,,, | Performed by: NURSE PRACTITIONER

## 2022-02-23 PROCEDURE — 99999 PR PBB SHADOW E&M-EST. PATIENT-LVL V: CPT | Mod: PBBFAC,,, | Performed by: NURSE PRACTITIONER

## 2022-02-23 RX ORDER — FUROSEMIDE 40 MG/1
40 TABLET ORAL 2 TIMES DAILY
Qty: 180 TABLET | Refills: 0
Start: 2022-02-23 | End: 2022-04-28 | Stop reason: SDUPTHER

## 2022-02-23 RX ORDER — METFORMIN HYDROCHLORIDE 1000 MG/1
1000 TABLET ORAL 2 TIMES DAILY
COMMUNITY
Start: 2021-11-05 | End: 2022-02-23

## 2022-02-23 RX ORDER — LISINOPRIL 10 MG/1
10 TABLET ORAL DAILY
Qty: 90 TABLET | Refills: 3 | Status: SHIPPED | OUTPATIENT
Start: 2022-02-23 | End: 2023-01-27 | Stop reason: SDUPTHER

## 2022-02-23 RX ORDER — BUPRENORPHINE AND NALOXONE 12; 3 MG/1; MG/1
FILM, SOLUBLE BUCCAL; SUBLINGUAL DAILY
COMMUNITY
Start: 2022-02-09 | End: 2023-02-26

## 2022-02-23 NOTE — PROGRESS NOTES
Subjective:       Patient ID: Celine Steen is a 66 y.o. female.    Chief Complaint: Hospital Follow Up    HPI    Admission Date: 2/13/2022  Hospital Length of Stay: 2 days  Discharge Date and Time:  02/15/2022     Celine Steen is a 66 year old female with a past medical history of HTN, HLD, DM, CAD, anxiety and past surgical history of CABG and defibrillator placement, who presents  today for hospital follow up for CHF excerbation. PCP Josseline Vargas DNP.  complaint of sudden onset SOB. She states she was diagnosed with covid on 1/27/22, and while she remained essentially asymptomatic, she states she has had more fatigue and sleeping more than normal.    CXR showed bilateral pulmonary  edema/congestion, and she was given Lasix 80 mg IV. She began to diurese and became less short of breath. She was taken off BiPAP and placed on 4L O2 via NC, tolerating with no issue or distress with an oxygen saturation of %. BNP level was 115. Troponin negative. CMP showed a potassium of 5.3, glucose of 267 and anion gap of 21. ABG showed pH of 7.296, pCO2 49.3, pO2 361 and HCO3 24.      daily weights. Patient was educated on monitoring daily weight, following low salt diet and in case if gain more than 3 pounds in 24 hours, to call their doctor for further advice. Patient's symptoms resolved.  Patient was transitioned to Lasix twice a day.  Patient was medically stable for discharge with outpatient follow-up with Cardiology-The estimated ejection fraction is 35%; A1c 7.0      Prn oxygen 2 liter  Patient reports Diarrhea yesterday x 3, N/V x 2 days; only naused today -had chilly and cream of muscle soup before s/s.  Past Medical History:   Diagnosis Date    Anxiety     CAD (coronary artery disease)     Chronic low back pain     Diabetes 2/1/2012    Diabetes mellitus     Hyperkalemia 2/13/2022    Hypertension     Leukocytosis 2/13/2022    Metabolic acidosis with respiratory acidosis 2/13/2022       Review of  patient's allergies indicates:  No Known Allergies      Current Outpatient Medications:     amLODIPine (NORVASC) 5 MG tablet, Take 1 tablet (5 mg total) by mouth once daily., Disp: 30 tablet, Rfl: 11    aspirin (ECOTRIN) 81 MG EC tablet, Take 81 mg by mouth once daily., Disp: , Rfl:     buprenorphine-naloxone 12-3 mg Film, Place under the tongue once daily., Disp: , Rfl:     clonazePAM (KLONOPIN) 1 MG tablet, Take 1 mg by mouth daily as needed., Disp: , Rfl:     coenzyme Q10 200 mg capsule, Take 200 mg by mouth once daily., Disp: , Rfl:     escitalopram oxalate (LEXAPRO) 20 MG tablet, TAKE 1/2 TABLET BY MOUTH TWICE DAILY, Disp: 90 tablet, Rfl: 0    fenofibrate (TRICOR) 145 MG tablet, TAKE 1 TABLET BY MOUTH EVERY DAY, Disp: 90 tablet, Rfl: 2    furosemide (LASIX) 40 MG tablet, Take 1 tablet (40 mg total) by mouth 2 (two) times a day., Disp: 180 tablet, Rfl: 0    lisinopriL 10 MG tablet, Take 1 tablet (10 mg total) by mouth once daily., Disp: 90 tablet, Rfl: 3    metformin (GLUCOPHAGE) 500 MG tablet, TAKE 1 TABLET BY MOUTH TWICE DAILY, Disp: 180 tablet, Rfl: 0    metoprolol succinate (TOPROL-XL) 100 MG 24 hr tablet, TAKE 1 TABLET BY MOUTH EVERY DAY, Disp: 90 tablet, Rfl: 0    nicotine (NICODERM CQ) 14 mg/24 hr, Place 1 patch onto the skin once daily. Okay to dispense generic., Disp: 14 patch, Rfl: 0    oxybutynin (DITROPAN-XL) 5 MG TR24, Take 1 tablet (5 mg total) by mouth once daily., Disp: 30 tablet, Rfl: 12    simvastatin (ZOCOR) 40 MG tablet, TAKE 1 TABLET BY MOUTH EVERY EVENING, Disp: 90 tablet, Rfl: 3    varenicline (CHANTIX) 1 mg Tab, Take 1 tablet (1 mg total) by mouth 2 (two) times daily., Disp: 60 tablet, Rfl: 0    cetirizine (ZYRTEC) 10 MG tablet, Take 1 tablet (10 mg total) by mouth once daily., Disp: 30 tablet, Rfl: 2    nitroGLYCERIN (NITROSTAT) 0.3 MG SL tablet, Place 1 tablet (0.3 mg total) under the tongue every 5 (five) minutes as needed for Chest pain., Disp: 25 tablet, Rfl:  "12    Review of Systems   Constitutional: Negative for unexpected weight change.   HENT: Negative for trouble swallowing.    Eyes: Negative for visual disturbance.   Respiratory: Negative for shortness of breath.    Cardiovascular: Negative for chest pain, palpitations and leg swelling.   Gastrointestinal: Negative for blood in stool.   Genitourinary: Negative for hematuria.   Skin: Negative for rash.   Allergic/Immunologic: Negative for immunocompromised state.   Neurological: Negative for headaches.   Hematological: Does not bruise/bleed easily.   Psychiatric/Behavioral: Negative for agitation. The patient is not nervous/anxious.        Objective:      /76   Pulse 83   Temp 98.4 °F (36.9 °C)   Ht 5' 2" (1.575 m)   Wt 76.7 kg (169 lb)   SpO2 98%   BMI 30.91 kg/m²   Physical Exam  Constitutional:       Appearance: She is well-developed.   Eyes:      Pupils: Pupils are equal, round, and reactive to light.   Cardiovascular:      Rate and Rhythm: Normal rate and regular rhythm.      Heart sounds: Normal heart sounds.   Pulmonary:      Effort: Pulmonary effort is normal.      Breath sounds: Normal breath sounds.   Abdominal:      General: Bowel sounds are normal.      Palpations: Abdomen is soft.   Musculoskeletal:         General: Normal range of motion.      Cervical back: Normal range of motion.   Skin:     General: Skin is warm and dry.   Neurological:      Mental Status: She is alert and oriented to person, place, and time.   Psychiatric:         Behavior: Behavior normal.         Thought Content: Thought content normal.         Judgment: Judgment normal.         Assessment:       1. Hospital discharge follow-up    2. Acute on chronic diastolic congestive heart failure    3. On supplemental oxygen by nasal cannula    4. Generalized weakness    5. ICD (implantable cardioverter-defibrillator) in place    6. Type 2 diabetes mellitus without complication, without long-term current use of insulin    7. " Hypertension associated with type 2 diabetes mellitus    8. Hyperlipidemia associated with type 2 diabetes mellitus    9. Diabetes mellitus type 2 in obese        Plan:       Hospital discharge follow-up  -     Hemoglobin A1C; Future; Expected date: 02/23/2022  -     Comprehensive Metabolic Panel; Future; Expected date: 02/23/2022  -     CBC W/ AUTO DIFFERENTIAL; Future; Expected date: 02/23/2022    Acute on chronic diastolic congestive heart failure  -     furosemide (LASIX) 40 MG tablet; Take 1 tablet (40 mg total) by mouth 2 (two) times a day.  Dispense: 180 tablet; Refill: 0  -     Ambulatory referral/consult to Cardiology; Future; Expected date: 03/02/2022  -     SUBSEQUENT HOME HEALTH ORDERS    On supplemental oxygen by nasal cannula  -     SUBSEQUENT HOME HEALTH ORDERS    Generalized weakness  -     SUBSEQUENT HOME HEALTH ORDERS    ICD (implantable cardioverter-defibrillator) in place  -     Ambulatory referral/consult to Cardiology; Future; Expected date: 03/02/2022    Type 2 diabetes mellitus without complication, without long-term current use of insulin  -     Hemoglobin A1C; Future; Expected date: 02/23/2022  -     Comprehensive Metabolic Panel; Future; Expected date: 02/23/2022  -     CBC W/ AUTO DIFFERENTIAL; Future; Expected date: 02/23/2022  A1c at goal  Continue medication  Follow the ADA diet  Hemoglobin A1C   Date Value Ref Range Status   02/13/2022 7.0 (H) 4.0 - 5.6 % Final     Comment:     ADA Screening Guidelines:  5.7-6.4%  Consistent with prediabetes  >or=6.5%  Consistent with diabetes    High levels of fetal hemoglobin interfere with the HbA1C  assay. Heterozygous hemoglobin variants (HbS, HgC, etc)do  not significantly interfere with this assay.   However, presence of multiple variants may affect accuracy.     08/25/2021 6.3 (H) 4.0 - 5.6 % Final     Comment:     ADA Screening Guidelines:  5.7-6.4%  Consistent with prediabetes  >or=6.5%  Consistent with diabetes    High levels of fetal  hemoglobin interfere with the HbA1C  assay. Heterozygous hemoglobin variants (HbS, HgC, etc)do  not significantly interfere with this assay.   However, presence of multiple variants may affect accuracy.     12/09/2015 6.3 (H) 4.5 - 6.2 % Final     Hypertension associated with type 2 diabetes mellitus  -     lisinopriL 10 MG tablet; Take 1 tablet (10 mg total) by mouth once daily.  Dispense: 90 tablet; Refill: 3  Stable, continue medication  Low sodium diet  BP Readings from Last 3 Encounters:   02/23/22 126/76   02/15/22 (!) 118/56   01/27/22 (!) 147/84     Hyperlipidemia associated with type 2 diabetes mellitus  Stable, on Zocor  The 10-year ASCVD risk score (Ori VALLEJO Jr., et al., 2013) is: 18.3%    Values used to calculate the score:      Age: 66 years      Sex: Female      Is Non- : No      Diabetic: Yes      Tobacco smoker: No      Systolic Blood Pressure: 126 mmHg      Is BP treated: Yes      HDL Cholesterol: 32 mg/dL      Total Cholesterol: 134 mg/dL  Diabetes mellitus type 2 in obese          Patient readiness: acceptance and barriers:none    During the course of the visit the patient was educated and counseled about the following:     Diabetes:  Discussed general issues about diabetes pathophysiology and management.  Addressed ADA diet.  Encouraged aerobic exercise.  Hypertension:   Dietary sodium restriction.  Regular aerobic exercise.  Obesity:   General weight loss/lifestyle modification strategies discussed (elicit support from others; identify saboteurs; non-food rewards, etc).  Regular aerobic exercise program discussed.    Goals: Diabetes: Maintain Hemoglobin A1C below 7, Hypertension: Reduce Blood Pressure and Obesity: Reduce calorie intake and BMI    Did patient meet goals/outcomes: Yes    The following self management tools provided: declined    Patient Instructions (the written plan) was given to the patient/family.     Time spent with patient: 15 minutes    Barriers to  medications present (no )    Adverse reactions to current medications (no)    Over the counter medications reviewed (Yes)

## 2022-02-23 NOTE — PATIENT INSTRUCTIONS
If you are due for any health screening(s) below please notify me so we can arrange them to be ordered and scheduled to maintain your health.     Health Maintenance   Topic Date Due    Eye Exam  Never done    TETANUS VACCINE  Never done    Foot Exam  02/18/2017    High Dose Statin  03/05/2017    Mammogram  02/14/2021    Hemoglobin A1c  08/13/2022    Lipid Panel  08/25/2022    DEXA Scan  02/14/2023    Hepatitis C Screening  Completed       Breast Cancer Screening    Breast cancer is the second most common cancer in women after skin cancer, and the second leading cause of death from cancer after lung cancer. Mammograms can detect breast cancer early, which significantly increases the chances of curing the cancer.      A screening mammogram is an x-ray image of the breasts used for early breast cancer detection. It can help reduce the number of deaths from breast cancer among women. To get a clear image, the breast is placed between two plastic plates to make it flat. How often a mammogram is needed depends on your age and your breast cancer risk.    Although you are still overdue for this important screening, due to the COVID-19 pandemic, we recommend scheduling it in the near future.          Diabetic Retinal Eye Exam    Diabetes is the #1 cause of blindness in the [place] - early detection before signs or symptoms develop can prevent debilitating blindness.    Once-a-year screening is recommended for all diabetic patients. This exam can prevent and treat diabetes complications in the eye before developing symptoms. This can be done with a special camera is used to take photographs of the back of your eye without having to dilate them, or you can see an eye doctor for a full dilated exam.    Although you are still overdue for this important screening, due to the COVID-19 pandemic, we recommend scheduling it in the near future.

## 2022-03-11 ENCOUNTER — HOSPITAL ENCOUNTER (EMERGENCY)
Facility: HOSPITAL | Age: 67
Discharge: HOME | End: 2022-03-11
Payer: MEDICARE

## 2022-03-11 ENCOUNTER — TELEPHONE (OUTPATIENT)
Dept: MEDSURG UNIT | Facility: HOSPITAL | Age: 67
End: 2022-03-11
Payer: MEDICARE

## 2022-03-11 VITALS
HEART RATE: 80 BPM | SYSTOLIC BLOOD PRESSURE: 118 MMHG | TEMPERATURE: 98 F | WEIGHT: 169 LBS | OXYGEN SATURATION: 94 % | DIASTOLIC BLOOD PRESSURE: 58 MMHG | BODY MASS INDEX: 30.91 KG/M2 | RESPIRATION RATE: 18 BRPM

## 2022-03-11 PROCEDURE — 99900041 HC LEFT WITHOUT BEING SEEN- EMERGENCY

## 2022-03-11 NOTE — FIRST PROVIDER EVALUATION
Emergency Department TeleTriage Encounter Note      CHIEF COMPLAINT    Chief Complaint   Patient presents with    buttocks pain     Pt reports right buttocks pain x 3 days , constant. Pt denies injury       VITAL SIGNS   Initial Vitals [03/11/22 1637]   BP Pulse Resp Temp SpO2   (!) 118/58 80 18 97.5 °F (36.4 °C) (!) 94 %      MAP       --            ALLERGIES    Review of patient's allergies indicates:  No Known Allergies    PROVIDER TRIAGE NOTE  This is a teletriage evaluation of a 66 y.o. female presenting to the ED complaining of buttocks pain. Patient reports constant pain to her right buttock for 3 days. She denies trauma or injury. She denies low back pain.     Initial orders will be placed and care will be transferred to an alternate provider when patient is roomed for a full evaluation. Any additional orders and the final disposition will be determined by that provider.           ORDERS  Labs Reviewed - No data to display    ED Orders (720h ago, onward)    None            Virtual Visit Note: The provider triage portion of this emergency department evaluation and documentation was performed via [employer], a HIPAA-compliant telemedicine application, in concert with a tele-presenter in the room. A face to face patient evaluation with one of my colleagues will occur once the patient is placed in an emergency department room.      DISCLAIMER: This note was prepared with [employer] voice recognition transcription software. Garbled syntax, mangled pronouns, and other bizarre constructions may be attributed to that software system.

## 2022-03-12 NOTE — ED NOTES
Called pt from waiting and no answer. Per registration pt decided to leave about 15 to 20 mins ago

## 2022-04-28 ENCOUNTER — OFFICE VISIT (OUTPATIENT)
Dept: CARDIOLOGY | Facility: CLINIC | Age: 67
End: 2022-04-28
Payer: MEDICARE

## 2022-04-28 VITALS
BODY MASS INDEX: 32.57 KG/M2 | HEIGHT: 62 IN | WEIGHT: 177 LBS | OXYGEN SATURATION: 100 % | DIASTOLIC BLOOD PRESSURE: 70 MMHG | HEART RATE: 51 BPM | SYSTOLIC BLOOD PRESSURE: 120 MMHG

## 2022-04-28 DIAGNOSIS — I20.89 ANGINAL EQUIVALENT: Primary | ICD-10-CM

## 2022-04-28 DIAGNOSIS — I73.9 PVD (PERIPHERAL VASCULAR DISEASE): ICD-10-CM

## 2022-04-28 DIAGNOSIS — E03.4 HYPOTHYROIDISM DUE TO ACQUIRED ATROPHY OF THYROID: ICD-10-CM

## 2022-04-28 DIAGNOSIS — E11.59 TYPE 2 DIABETES MELLITUS WITH OTHER CIRCULATORY COMPLICATION, WITHOUT LONG-TERM CURRENT USE OF INSULIN: ICD-10-CM

## 2022-04-28 DIAGNOSIS — I50.33 ACUTE ON CHRONIC DIASTOLIC CONGESTIVE HEART FAILURE: ICD-10-CM

## 2022-04-28 DIAGNOSIS — Z95.810 ICD (IMPLANTABLE CARDIOVERTER-DEFIBRILLATOR) IN PLACE: ICD-10-CM

## 2022-04-28 DIAGNOSIS — I25.10 ASCVD (ARTERIOSCLEROTIC CARDIOVASCULAR DISEASE): ICD-10-CM

## 2022-04-28 PROCEDURE — 99205 PR OFFICE/OUTPT VISIT, NEW, LEVL V, 60-74 MIN: ICD-10-PCS | Mod: S$GLB,,, | Performed by: SPECIALIST

## 2022-04-28 PROCEDURE — 99205 OFFICE O/P NEW HI 60 MIN: CPT | Mod: S$GLB,,, | Performed by: SPECIALIST

## 2022-04-28 RX ORDER — METFORMIN HYDROCHLORIDE 500 MG/1
500 TABLET ORAL 2 TIMES DAILY
Qty: 180 TABLET | Refills: 2 | Status: SHIPPED | OUTPATIENT
Start: 2022-04-28 | End: 2023-01-23

## 2022-04-28 RX ORDER — FUROSEMIDE 40 MG/1
40 TABLET ORAL 2 TIMES DAILY
Qty: 180 TABLET | Refills: 2 | Status: SHIPPED | OUTPATIENT
Start: 2022-04-28 | End: 2023-01-23

## 2022-04-28 NOTE — PROGRESS NOTES
Subjective:    Patient ID:  Celine Steen is a 66 y.o. female who presents for   Hospital Follow Up, Congestive Heart Failure, and Coronary Artery Disease    1)    chf-  3 vessel  ccabg eckholdt       2022- covid and chf  Feel  Ok now ef 35%5     on otc  k \  No cp     2) a1c hi   Glucose hi   No cigarettes    no ca - cva   Basically she had heart surgery-she did not have chest pain it he was recently hospitalized with CHF and COVID and is taking her medication  She feels fine now without cardiac symptoms      Review of patient's allergies indicates:  No Known Allergies    Past Medical History:   Diagnosis Date    Anxiety     CAD (coronary artery disease)     CHF (congestive heart failure)     Chronic low back pain     Diabetes 2012    Diabetes mellitus     Hyperkalemia 2022    Hypertension     Leukocytosis 2022    Metabolic acidosis with respiratory acidosis 2022     Past Surgical History:   Procedure Laterality Date    CARDIAC DEFIBRILLATOR PLACEMENT      CORONARY ARTERY BYPASS GRAFT      HYSTERECTOMY       Social History     Tobacco Use    Smoking status: Former Smoker     Quit date: 2018     Years since quittin.3    Smokeless tobacco: Never Used   Substance Use Topics    Alcohol use: No        Review of Systems     Review of Systems   Constitutional: Negative for decreased appetite, malaise/fatigue, weight gain and weight loss.        + vaccine    HENT: Negative.  Negative for congestion, hearing loss and tinnitus.    Eyes: Negative for blurred vision, vision loss in left eye, vision loss in right eye, visual disturbance and visual halos.   Cardiovascular: Negative for chest pain, claudication, irregular heartbeat, leg swelling, near-syncope, orthopnea, palpitations, paroxysmal nocturnal dyspnea and syncope.   Respiratory: Positive for shortness of breath. Negative for cough, hemoptysis, sleep disturbances due to breathing, snoring, sputum production and  wheezing.    Endocrine: Negative for polydipsia, polyphagia and polyuria.   Hematologic/Lymphatic: Negative for adenopathy. Does not bruise/bleed easily.   Skin: Negative for dry skin, itching, poor wound healing, rash, skin cancer and suspicious lesions.   Musculoskeletal: Negative.  Negative for arthritis, back pain, falls, gout, joint pain, joint swelling, muscle cramps, muscle weakness, neck pain and stiffness.   Gastrointestinal: Positive for heartburn. Negative for abdominal pain, change in bowel habit, constipation, diarrhea, hematemesis, hemorrhoids, melena, nausea and vomiting.   Genitourinary: Negative.  Negative for bladder incontinence, dysuria, flank pain, frequency, hematuria, nocturia and non-menstrual bleeding.   Neurological: Negative.  Negative for brief paralysis, difficulty with concentration, disturbances in coordination, dizziness, focal weakness, headaches, loss of balance, numbness, paresthesias and tremors.   Psychiatric/Behavioral: Negative for altered mental status, depression, hallucinations, memory loss, substance abuse, suicidal ideas and thoughts of violence. The patient does not have insomnia and is not nervous/anxious.    Allergic/Immunologic: Negative for environmental allergies and hives.           Objective:        Vitals:    04/28/22 1548   BP: 120/70   Pulse: (!) 51       Lab Results   Component Value Date    WBC 11.95 02/14/2022    HGB 12.7 02/14/2022    HCT 41.0 02/14/2022     02/14/2022    CHOL 134 08/25/2021    TRIG 136 08/25/2021    HDL 32 (L) 08/25/2021    ALT 33 02/13/2022    AST 26 02/13/2022     02/15/2022    K 4.7 02/15/2022    CL 95 02/15/2022    CREATININE 0.9 02/15/2022    BUN 30 (H) 02/15/2022    CO2 35 (H) 02/15/2022    TSH 1.542 08/25/2021    INR 1.0 03/18/2009    HGBA1C 7.0 (H) 02/13/2022        ECHOCARDIOGRAM RESULTS  Results for orders placed during the hospital encounter of 02/13/22    Echo    Interpretation Summary  · The estimated ejection  fraction is 35%.  · The left ventricle is normal in size with mild concentric hypertrophy and moderately decreased systolic function.  · Grade II left ventricular diastolic dysfunction.  · Moderate left atrial enlargement.  · There is moderate left ventricular global hypokinesis.  · Mild right atrial enlargement.  · Mild mitral regurgitation.  · Mild tricuspid regurgitation.      CURRENT/PREVIOUS VISIT EKG  Results for orders placed or performed during the hospital encounter of 02/13/22   EKG 12-lead    Collection Time: 02/13/22  1:20 AM    Narrative    Test Reason : R06.02,    Vent. Rate : 113 BPM     Atrial Rate : 113 BPM     P-R Int : 144 ms          QRS Dur : 084 ms      QT Int : 358 ms       P-R-T Axes : 071 092 076 degrees     QTc Int : 491 ms    Sinus tachycardia  Rightward axis  Septal infarct ,age undetermined  Abnormal ECG  When compared with ECG of 19-MAR-2010 11:09,  Vent. rate has increased BY  47 BPM  Septal infarct is now Present  T wave inversion no longer evident in Anterior leads  Confirmed by Oksana MI, Miguel ALVAREZ ([id number]) on 2/14/2022 9:04:48 AM    Referred By: JAJA   SELF           Confirmed By:Miguel Gandhi MD     Results for orders placed during the hospital encounter of 02/13/22    Echo    Interpretation Summary  · The estimated ejection fraction is 35%.  · The left ventricle is normal in size with mild concentric hypertrophy and moderately decreased systolic function.  · Grade II left ventricular diastolic dysfunction.  · Moderate left atrial enlargement.  · There is moderate left ventricular global hypokinesis.  · Mild right atrial enlargement.  · Mild mitral regurgitation.  · Mild tricuspid regurgitation.    No results found for this or any previous visit.      PHYSICAL EXAM    Physical Exam blood pressure 130/80  Head neck no bruits  Lungs are clear  Cardiac regular  Abdomen slightly obese  Extremities mild edema decreased pulses    Medication List with Changes/Refills    Current Medications    AMLODIPINE (NORVASC) 5 MG TABLET    Take 1 tablet (5 mg total) by mouth once daily.    ASPIRIN (ECOTRIN) 81 MG EC TABLET    Take 81 mg by mouth once daily.    BUPRENORPHINE-NALOXONE 12-3 MG FILM    Place under the tongue once daily.    CETIRIZINE (ZYRTEC) 10 MG TABLET    Take 1 tablet (10 mg total) by mouth once daily.    CLONAZEPAM (KLONOPIN) 1 MG TABLET    Take 1 mg by mouth daily as needed.    COENZYME Q10 200 MG CAPSULE    Take 200 mg by mouth once daily.    ESCITALOPRAM OXALATE (LEXAPRO) 20 MG TABLET    TAKE 1/2 TABLET BY MOUTH TWICE DAILY    FENOFIBRATE (TRICOR) 145 MG TABLET    TAKE 1 TABLET BY MOUTH EVERY DAY    FUROSEMIDE (LASIX) 40 MG TABLET    Take 1 tablet (40 mg total) by mouth 2 (two) times a day.    LISINOPRIL 10 MG TABLET    Take 1 tablet (10 mg total) by mouth once daily.    METFORMIN (GLUCOPHAGE) 500 MG TABLET    TAKE 1 TABLET BY MOUTH TWICE DAILY    METOPROLOL SUCCINATE (TOPROL-XL) 100 MG 24 HR TABLET    TAKE 1 TABLET BY MOUTH EVERY DAY    NICOTINE (NICODERM CQ) 14 MG/24 HR    Place 1 patch onto the skin once daily. Okay to dispense generic.    NITROGLYCERIN (NITROSTAT) 0.3 MG SL TABLET    Place 1 tablet (0.3 mg total) under the tongue every 5 (five) minutes as needed for Chest pain.    OXYBUTYNIN (DITROPAN-XL) 5 MG TR24    Take 1 tablet (5 mg total) by mouth once daily.    SIMVASTATIN (ZOCOR) 40 MG TABLET    TAKE 1 TABLET BY MOUTH EVERY EVENING    VARENICLINE (CHANTIX) 1 MG TAB    Take 1 tablet (1 mg total) by mouth 2 (two) times daily.           Assessment:     diabetes mellitus out of control;; peripheral vascular disease; heart failure reduced ejection fraction  1. Acute on chronic diastolic congestive heart failure    2. ICD (implantable cardioverter-defibrillator) in place    I personally reviewed old and new ecg's, lab reports,, xray reports  and  other cardiovascular studies including  echo's, stress tests, angiogram reports, holters,and vascular studies .  In  addition I evaluated original cardiac cath  ___echo  ____cxr ______ct ____scan on [employer] view or [employer] or other viewing platforms .  I reviewed  office and hospital notes Yes x____ of  referring providers.       Plan:   Will get nuclear stress test; ADA diet instruction; ABIs  Refill and continue medications  She is a good candidate for SG LT 2 inhibitor is-discussed that with her next visit  Problem List Items Addressed This Visit        Cardiac/Vascular    ICD (implantable cardioverter-defibrillator) in place    Acute exacerbation of CHF (congestive heart failure)           No follow-ups on file.

## 2022-05-04 ENCOUNTER — CLINICAL SUPPORT (OUTPATIENT)
Dept: CARDIOLOGY | Facility: HOSPITAL | Age: 67
End: 2022-05-04
Attending: SPECIALIST
Payer: MEDICARE

## 2022-05-04 DIAGNOSIS — I73.9 PVD (PERIPHERAL VASCULAR DISEASE): ICD-10-CM

## 2022-05-04 PROCEDURE — 93923 UPR/LXTR ART STDY 3+ LVLS: CPT | Mod: 50

## 2022-05-04 PROCEDURE — 93923 UPR/LXTR ART STDY 3+ LVLS: CPT | Mod: 26,,, | Performed by: SPECIALIST

## 2022-05-04 PROCEDURE — 93923 SEGMENTAL PRESSURE LOWER EXTREMITY: ICD-10-PCS | Mod: 26,,, | Performed by: SPECIALIST

## 2022-05-23 ENCOUNTER — HOSPITAL ENCOUNTER (OUTPATIENT)
Dept: RADIOLOGY | Facility: CLINIC | Age: 67
Discharge: HOME | End: 2022-05-23
Attending: SPECIALIST
Payer: MEDICARE

## 2022-05-23 ENCOUNTER — HOSPITAL ENCOUNTER (OUTPATIENT)
Dept: CARDIOLOGY | Facility: CLINIC | Age: 67
Discharge: HOME | End: 2022-05-23
Attending: SPECIALIST
Payer: MEDICARE

## 2022-05-23 DIAGNOSIS — I20.89 ANGINAL EQUIVALENT: ICD-10-CM

## 2022-05-23 LAB
LEFT ABI: 0.76
LEFT ARM BP: 96 MMHG
LEFT CALF BP: 86 MMHG
LEFT LOWER LEG BP: 82 MMHG
LEFT POSTERIOR TIBIAL: 73 MMHG
LEFT UPPER LEG BP: 67 MMHG
RIGHT ABI: 0.85
RIGHT ARM BP: 96 MMHG
RIGHT CALF BP: 78 MMHG
RIGHT LOWER LEG BP: 93 MMHG
RIGHT POSTERIOR TIBIAL: 82 MMHG
RIGHT UPPER LEG BP: 72 MMHG

## 2022-05-23 PROCEDURE — 93015 STRESS TEST WITH MYOCARDIAL PERFUSION (CUPID ONLY): ICD-10-PCS | Mod: S$GLB,,, | Performed by: SPECIALIST

## 2022-05-23 PROCEDURE — 78452 STRESS TEST WITH MYOCARDIAL PERFUSION (CUPID ONLY): ICD-10-PCS | Mod: S$GLB,,, | Performed by: SPECIALIST

## 2022-05-23 PROCEDURE — 78452 HT MUSCLE IMAGE SPECT MULT: CPT | Mod: S$GLB,,, | Performed by: SPECIALIST

## 2022-05-23 PROCEDURE — 93015 CV STRESS TEST SUPVJ I&R: CPT | Mod: S$GLB,,, | Performed by: SPECIALIST

## 2022-05-23 PROCEDURE — A9502 STRESS TEST WITH MYOCARDIAL PERFUSION (CUPID ONLY): ICD-10-PCS | Mod: S$GLB,,, | Performed by: SPECIALIST

## 2022-05-23 PROCEDURE — A9502 TC99M TETROFOSMIN: HCPCS | Mod: S$GLB,,, | Performed by: SPECIALIST

## 2022-05-23 RX ORDER — REGADENOSON 0.08 MG/ML
0.4 INJECTION, SOLUTION INTRAVENOUS ONCE
Status: COMPLETED | OUTPATIENT
Start: 2022-05-23 | End: 2022-05-23

## 2022-05-23 RX ADMIN — REGADENOSON 0.4 MG: 0.08 INJECTION, SOLUTION INTRAVENOUS at 09:05

## 2022-05-24 LAB
CV PHARM DOSE: 0.4 MG
CV STRESS BASE HR: 75 BPM
DIASTOLIC BLOOD PRESSURE: 78 MMHG
EJECTION FRACTION- HIGH: 65 %
END DIASTOLIC INDEX-HIGH: 158 ML/M2
END DIASTOLIC INDEX-LOW: 94 ML/M2
END SYSTOLIC INDEX-HIGH: 71 ML/M2
END SYSTOLIC INDEX-LOW: 33 ML/M2
NUC STRESS DIASTOLIC VOLUME INDEX: 79
NUC STRESS EJECTION FRACTION: 47 %
NUC STRESS SYSTOLIC VOLUME INDEX: 42
OHS CV CPX 1 MINUTE RECOVERY HEART RATE: 127 BPM
OHS CV CPX 85 PERCENT MAX PREDICTED HEART RATE MALE: 123
OHS CV CPX MAX PREDICTED HEART RATE: 145
OHS CV CPX PATIENT IS FEMALE: 1
OHS CV CPX PATIENT IS MALE: 0
OHS CV CPX PEAK DIASTOLIC BLOOD PRESSURE: 90 MMHG
OHS CV CPX PEAK HEAR RATE: 127 BPM
OHS CV CPX PEAK RATE PRESSURE PRODUCT: NORMAL
OHS CV CPX PEAK SYSTOLIC BLOOD PRESSURE: 170 MMHG
OHS CV CPX PERCENT MAX PREDICTED HEART RATE ACHIEVED: 87
OHS CV CPX RATE PRESSURE PRODUCT PRESENTING: 9900
RETIRED EF AND QEF - SEE NOTES: 53 %
STRESS ECHO POST EXERCISE DUR MIN: 3 MINUTES
STRESS ECHO POST EXERCISE DUR SEC: 0 SECONDS
SYSTOLIC BLOOD PRESSURE: 132 MMHG

## 2022-06-13 ENCOUNTER — TELEPHONE (OUTPATIENT)
Dept: ADMINISTRATIVE | Facility: HOSPITAL | Age: 67
End: 2022-06-13
Payer: MEDICARE

## 2022-11-08 ENCOUNTER — OFFICE VISIT (OUTPATIENT)
Dept: URGENT CARE | Facility: CLINIC | Age: 67
End: 2022-11-08
Payer: MEDICARE

## 2022-11-08 VITALS
HEIGHT: 63 IN | BODY MASS INDEX: 32.07 KG/M2 | WEIGHT: 181 LBS | OXYGEN SATURATION: 96 % | DIASTOLIC BLOOD PRESSURE: 69 MMHG | RESPIRATION RATE: 20 BRPM | HEART RATE: 77 BPM | TEMPERATURE: 97 F | SYSTOLIC BLOOD PRESSURE: 137 MMHG

## 2022-11-08 DIAGNOSIS — J32.9 BACTERIAL SINUSITIS: ICD-10-CM

## 2022-11-08 DIAGNOSIS — J06.9 UPPER RESPIRATORY TRACT INFECTION, UNSPECIFIED TYPE: ICD-10-CM

## 2022-11-08 DIAGNOSIS — R05.9 COUGH, UNSPECIFIED TYPE: Primary | ICD-10-CM

## 2022-11-08 DIAGNOSIS — B96.89 BACTERIAL SINUSITIS: ICD-10-CM

## 2022-11-08 LAB
CTP QC/QA: YES
CTP QC/QA: YES
FLUAV AG NPH QL: NEGATIVE
FLUBV AG NPH QL: NEGATIVE
SARS-COV-2 AG RESP QL IA.RAPID: NEGATIVE

## 2022-11-08 PROCEDURE — 87804 POCT INFLUENZA A/B: ICD-10-PCS | Mod: 59,QW,,

## 2022-11-08 PROCEDURE — 87811 SARS-COV-2 COVID19 W/OPTIC: CPT | Mod: QW,CR,S$GLB,

## 2022-11-08 PROCEDURE — 87804 INFLUENZA ASSAY W/OPTIC: CPT | Mod: QW,,,

## 2022-11-08 PROCEDURE — 87811 SARS CORONAVIRUS 2 ANTIGEN POCT, MANUAL READ: ICD-10-PCS | Mod: QW,CR,S$GLB,

## 2022-11-08 PROCEDURE — 99214 PR OFFICE/OUTPT VISIT, EST, LEVL IV, 30-39 MIN: ICD-10-PCS | Mod: S$GLB,,,

## 2022-11-08 PROCEDURE — 99214 OFFICE O/P EST MOD 30 MIN: CPT | Mod: S$GLB,,,

## 2022-11-08 RX ORDER — AZELASTINE 1 MG/ML
1 SPRAY, METERED NASAL 2 TIMES DAILY
Qty: 30 ML | Refills: 0 | Status: SHIPPED | OUTPATIENT
Start: 2022-11-08 | End: 2024-02-27

## 2022-11-08 RX ORDER — BENZONATATE 200 MG/1
200 CAPSULE ORAL 3 TIMES DAILY PRN
Qty: 15 CAPSULE | Refills: 0 | Status: SHIPPED | OUTPATIENT
Start: 2022-11-08 | End: 2022-11-13

## 2022-11-08 RX ORDER — AMOXICILLIN AND CLAVULANATE POTASSIUM 875; 125 MG/1; MG/1
1 TABLET, FILM COATED ORAL EVERY 12 HOURS
Qty: 14 TABLET | Refills: 0 | Status: SHIPPED | OUTPATIENT
Start: 2022-11-08 | End: 2022-11-15

## 2022-11-08 NOTE — PROGRESS NOTES
"Subjective:       Patient ID: Celine Steen is a 67 y.o. female.    Vitals:  height is 5' 3" (1.6 m) and weight is 82.1 kg (181 lb). Her temperature is 97.1 °F (36.2 °C). Her blood pressure is 137/69 and her pulse is 77. Her respiration is 20 and oxygen saturation is 96%.     Chief Complaint: Cough (Cough, congestion, and sore throat)    Cough  This is a new problem. The current episode started in the past 7 days (x 3-4 days). The problem has been gradually worsening. The problem occurs constantly. The cough is Productive of sputum. Associated symptoms include nasal congestion and a sore throat. Pertinent negatives include no chest pain, chills, ear pain, fever or shortness of breath. Treatments tried: Mucinex and Aleve. The treatment provided mild relief.     Constitution: Negative for activity change, appetite change, chills, sweating and fever.   HENT:  Positive for congestion, sinus pressure and sore throat. Negative for ear pain and sinus pain.    Neck: Negative for neck pain.   Cardiovascular:  Negative for chest pain.   Eyes:  Negative for blurred vision.   Respiratory:  Positive for cough. Negative for chest tightness and shortness of breath.    Gastrointestinal:  Negative for abdominal pain.   Neurological:  Negative for dizziness and history of vertigo.     Objective:      Physical Exam   Constitutional:  Non-toxic appearance. She does not appear ill. No distress.   HENT:   Head: Normocephalic.   Ears:   Right Ear: Tympanic membrane, external ear and ear canal normal.   Left Ear: Tympanic membrane, external ear and ear canal normal.   Nose: Congestion present.   Eyes: Conjunctivae are normal. Extraocular movement intact   Cardiovascular: Normal rate, normal heart sounds and normal pulses.   Pulmonary/Chest: Effort normal and breath sounds normal. No respiratory distress. She has no wheezes.   Abdominal: Soft. There is no abdominal tenderness. There is no guarding.   Neurological: no focal deficit. She is " alert.   Skin: Skin is not diaphoretic. Capillary refill takes 2 to 3 seconds.   Psychiatric: Her behavior is normal. Mood normal.       Assessment:       1. Cough, unspecified type    2. Bacterial sinusitis    3. Upper respiratory tract infection, unspecified type          Plan:         Cough, unspecified type  -     POCT Influenza A/B  -     SARS Coronavirus 2 Antigen, POCT Manual Read  -     benzonatate (TESSALON) 200 MG capsule; Take 1 capsule (200 mg total) by mouth 3 (three) times daily as needed for Cough.  Dispense: 15 capsule; Refill: 0    Bacterial sinusitis  -     amoxicillin-clavulanate 875-125mg (AUGMENTIN) 875-125 mg per tablet; Take 1 tablet by mouth every 12 (twelve) hours. for 7 days  Dispense: 14 tablet; Refill: 0  -     benzonatate (TESSALON) 200 MG capsule; Take 1 capsule (200 mg total) by mouth 3 (three) times daily as needed for Cough.  Dispense: 15 capsule; Refill: 0    Upper respiratory tract infection, unspecified type    Other orders  -     azelastine (ASTELIN) 137 mcg (0.1 %) nasal spray; 1 spray (137 mcg total) by Nasal route 2 (two) times daily. for 7 days  Dispense: 30 mL; Refill: 0       Patient presents with cough and congestion x 4 days, denies known exposures, thinks she may have a sinus infection, covid/flu negative, denies any fevers in past 24 hours, lungs clear on exam, will cover for bacterial sinusitis, instructed to fu with her PCP if symptoms persist, pt tolerating po, no acute distress.

## 2022-11-08 NOTE — PATIENT INSTRUCTIONS
For the next 2-3 days do a trail of Claritin  or zrytec .   Rotate tylenol and ibuprofen as needed if no contraindications for fever and pain.  Use Flonase and Astelin for nasal congestion.   Follow up with PCP in 2-5 days if symptoms persist.   Increase hydration. Get plenty of rest.   Mucinex as needed for cough.  Take antibiotics with food. Take probiotic.

## 2023-01-27 DIAGNOSIS — I15.2 HYPERTENSION ASSOCIATED WITH TYPE 2 DIABETES MELLITUS: ICD-10-CM

## 2023-01-27 DIAGNOSIS — E11.59 HYPERTENSION ASSOCIATED WITH TYPE 2 DIABETES MELLITUS: ICD-10-CM

## 2023-01-27 RX ORDER — LISINOPRIL 10 MG/1
10 TABLET ORAL DAILY
Qty: 90 TABLET | Refills: 3 | Status: SHIPPED | OUTPATIENT
Start: 2023-01-27 | End: 2024-01-22 | Stop reason: SDUPTHER

## 2023-02-17 ENCOUNTER — OFFICE VISIT (OUTPATIENT)
Dept: FAMILY MEDICINE | Facility: CLINIC | Age: 68
End: 2023-02-17
Payer: MEDICARE

## 2023-02-17 VITALS
HEART RATE: 67 BPM | HEIGHT: 63 IN | OXYGEN SATURATION: 96 % | DIASTOLIC BLOOD PRESSURE: 70 MMHG | WEIGHT: 184.06 LBS | BODY MASS INDEX: 32.61 KG/M2 | TEMPERATURE: 98 F | SYSTOLIC BLOOD PRESSURE: 130 MMHG

## 2023-02-17 DIAGNOSIS — M54.41 ACUTE BILATERAL LOW BACK PAIN WITH BILATERAL SCIATICA: ICD-10-CM

## 2023-02-17 DIAGNOSIS — R11.0 NAUSEA: ICD-10-CM

## 2023-02-17 DIAGNOSIS — Z95.810 ICD (IMPLANTABLE CARDIOVERTER-DEFIBRILLATOR) IN PLACE: ICD-10-CM

## 2023-02-17 DIAGNOSIS — Z78.9 ON SUPPLEMENTAL OXYGEN BY NASAL CANNULA: ICD-10-CM

## 2023-02-17 DIAGNOSIS — Z12.31 ENCOUNTER FOR SCREENING MAMMOGRAM FOR MALIGNANT NEOPLASM OF BREAST: ICD-10-CM

## 2023-02-17 DIAGNOSIS — M54.42 ACUTE BILATERAL LOW BACK PAIN WITH BILATERAL SCIATICA: ICD-10-CM

## 2023-02-17 DIAGNOSIS — E11.59 HYPERTENSION ASSOCIATED WITH TYPE 2 DIABETES MELLITUS: ICD-10-CM

## 2023-02-17 DIAGNOSIS — I73.9 PVD (PERIPHERAL VASCULAR DISEASE): ICD-10-CM

## 2023-02-17 DIAGNOSIS — E66.01 MORBID (SEVERE) OBESITY DUE TO EXCESS CALORIES: ICD-10-CM

## 2023-02-17 DIAGNOSIS — E11.9 TYPE 2 DIABETES MELLITUS WITHOUT COMPLICATION, WITHOUT LONG-TERM CURRENT USE OF INSULIN: ICD-10-CM

## 2023-02-17 DIAGNOSIS — Z23 ENCOUNTER FOR VACCINATION: ICD-10-CM

## 2023-02-17 DIAGNOSIS — E78.5 HYPERLIPIDEMIA ASSOCIATED WITH TYPE 2 DIABETES MELLITUS: ICD-10-CM

## 2023-02-17 DIAGNOSIS — E66.9 OBESITY (BMI 30.0-34.9): ICD-10-CM

## 2023-02-17 DIAGNOSIS — Z00.00 ANNUAL PHYSICAL EXAM: Primary | ICD-10-CM

## 2023-02-17 DIAGNOSIS — E11.69 HYPERLIPIDEMIA ASSOCIATED WITH TYPE 2 DIABETES MELLITUS: ICD-10-CM

## 2023-02-17 DIAGNOSIS — Z23 NEED FOR TD VACCINE: ICD-10-CM

## 2023-02-17 DIAGNOSIS — F31.9 BIPOLAR 1 DISORDER: ICD-10-CM

## 2023-02-17 DIAGNOSIS — I15.2 HYPERTENSION ASSOCIATED WITH TYPE 2 DIABETES MELLITUS: ICD-10-CM

## 2023-02-17 DIAGNOSIS — I50.33 ACUTE ON CHRONIC DIASTOLIC CONGESTIVE HEART FAILURE: ICD-10-CM

## 2023-02-17 PROBLEM — I20.89 ANGINAL EQUIVALENT: Status: ACTIVE | Noted: 2023-02-17

## 2023-02-17 PROCEDURE — 99215 OFFICE O/P EST HI 40 MIN: CPT | Mod: PBBFAC,PO,25 | Performed by: NURSE PRACTITIONER

## 2023-02-17 PROCEDURE — 99214 PR OFFICE/OUTPT VISIT, EST, LEVL IV, 30-39 MIN: ICD-10-PCS | Mod: S$PBB,,, | Performed by: NURSE PRACTITIONER

## 2023-02-17 PROCEDURE — 99999 PR PBB SHADOW E&M-EST. PATIENT-LVL V: ICD-10-PCS | Mod: PBBFAC,,, | Performed by: NURSE PRACTITIONER

## 2023-02-17 PROCEDURE — 99214 OFFICE O/P EST MOD 30 MIN: CPT | Mod: S$PBB,,, | Performed by: NURSE PRACTITIONER

## 2023-02-17 PROCEDURE — 90677 PCV20 VACCINE IM: CPT | Mod: PBBFAC,PO

## 2023-02-17 PROCEDURE — 99999 PR PBB SHADOW E&M-EST. PATIENT-LVL V: CPT | Mod: PBBFAC,,, | Performed by: NURSE PRACTITIONER

## 2023-02-17 RX ORDER — SIMVASTATIN 40 MG/1
40 TABLET, FILM COATED ORAL NIGHTLY
Qty: 90 TABLET | Refills: 3 | Status: SHIPPED | OUTPATIENT
Start: 2023-02-17 | End: 2024-02-27

## 2023-02-17 RX ORDER — ESCITALOPRAM OXALATE 20 MG/1
TABLET ORAL
Qty: 90 TABLET | Refills: 0 | Status: SHIPPED | OUTPATIENT
Start: 2023-02-17

## 2023-02-17 RX ORDER — CLOPIDOGREL BISULFATE 75 MG/1
75 TABLET ORAL DAILY
Qty: 90 TABLET | Refills: 3 | Status: SHIPPED | OUTPATIENT
Start: 2023-02-17 | End: 2024-01-22 | Stop reason: SDUPTHER

## 2023-02-17 RX ORDER — ONDANSETRON 4 MG/1
4 TABLET, ORALLY DISINTEGRATING ORAL EVERY 8 HOURS PRN
Qty: 30 TABLET | Refills: 0 | Status: SHIPPED | OUTPATIENT
Start: 2023-02-17

## 2023-02-17 RX ORDER — METOPROLOL SUCCINATE 100 MG/1
100 TABLET, EXTENDED RELEASE ORAL DAILY
Qty: 90 TABLET | Refills: 3 | Status: SHIPPED | OUTPATIENT
Start: 2023-02-17 | End: 2023-12-15

## 2023-02-17 RX ORDER — AMLODIPINE BESYLATE 5 MG/1
5 TABLET ORAL DAILY
Qty: 90 TABLET | Refills: 3 | Status: SHIPPED | OUTPATIENT
Start: 2023-02-17 | End: 2023-12-15

## 2023-02-17 NOTE — PATIENT INSTRUCTIONS
Dread Berger,     If you are due for any health screening(s) below please notify me so we can arrange them to be ordered and scheduled to maintain your health. Most healthy patients complete it. Don't lose out on improving your health.     All of your core healthy metrics are met.      Breast Cancer Screening    Breast cancer is the second most common cancer in women after skin cancer, and the second leading cause of death from cancer after lung cancer. Mammograms can detect breast cancer early, which significantly increases the chances of curing the cancer.      A screening mammogram is an x-ray image of the breasts used for early breast cancer detection. It can help reduce the number of deaths from breast cancer among women. To get a clear image, the breast is placed between two plastic plates to make it flat. How often a mammogram is needed depends on your age and your breast cancer risk.    Although you are still overdue for this important screening, due to the COVID-19 pandemic, we recommend scheduling it in the near future.          Diabetic Retinal Eye Exam    Diabetes is the #1 cause of blindness in the [place] - early detection before signs or symptoms develop can prevent debilitating blindness.    Once-a-year screening is recommended for all diabetic patients. This exam can prevent and treat diabetes complications in the eye before developing symptoms. This can be done with a special camera is used to take photographs of the back of your eye without having to dilate them, or you can see an eye doctor for a full dilated exam.    Although you are still overdue for this important screening, due to the COVID-19 pandemic, we recommend scheduling it in the near future.  Diabetic A1c Testing    Your chart identifies you as having diabetes. It is recommended that all diabetes patients have an A1c test done at least once a year, but [facility] recommends twice a year for most patients. This helps your doctor to  better help you manage your diabetes. This is a non-fasting lab test which can be completed at any time. Your result will be sent to your Primary Care Provider for review and that office will contact you with the results.

## 2023-02-17 NOTE — PROGRESS NOTES
Subjective:       Patient ID: Celine Steen is a 67 y.o. female.    Chief Complaint: Follow-up (Hips hurt after walking a while.)    HPI      Patient presents today for chronic conditions follow up.  past medical history of HTN, HLD, DM, CAD, anxiety and past surgical history of CABG and defibrillator placement. Last A1c 2/13/22 7.0      5/26/22 Optometry Dr.Keith Alvarez: Type 2 diabetes mellitus with mild nonproliferative diabetic retinopathy without macular edema, bilateral; Age-related nuclear cataract, bilateral    4/28/22 [facility] : Congestive Heart Failure, and Coronary Artery Disease: nuclear stress test-good candidate for SG LT 2 inhibitor     5/23/22 Stress test Conclusion         Abnormal myocardial perfusion scan.    There is a moderate intensity, fixed perfusion abnormality consistent with scar in the anteroapical wall(s).    The gated perfusion images showed an ejection fraction of 47% post stress. Normal ejection fraction is greater than 53%.    LV cavity size is mildly enlarged at rest and mildly enlarged at stress.    The EKG portion of this study is negative for ischemia.    The patient reported no chest pain during the stress test.    There were no arrhythmias during stress.     Wall motion abnormalities involving anterior apex     2/13/22 Hosptial stay for CHF  Past Medical History:   Diagnosis Date    Anxiety     CAD (coronary artery disease)     CHF (congestive heart failure)     Chronic low back pain     Diabetes 2/1/2012    Diabetes mellitus     Hyperkalemia 2/13/2022    Hypertension     Leukocytosis 2/13/2022    Metabolic acidosis with respiratory acidosis 2/13/2022       Review of patient's allergies indicates:  No Known Allergies      Current Outpatient Medications:     amLODIPine (NORVASC) 5 MG tablet, Take 1 tablet (5 mg total) by mouth once daily., Disp: 90 tablet, Rfl: 3    aspirin (ECOTRIN) 81 MG EC tablet, Take 81 mg by mouth once daily., Disp: , Rfl:     clonazePAM (KLONOPIN)  1 MG tablet, Take 1 mg by mouth daily as needed., Disp: , Rfl:     coenzyme Q10 200 mg capsule, Take 200 mg by mouth once daily., Disp: , Rfl:     EScitalopram oxalate (LEXAPRO) 20 MG tablet, TAKE 1/2 TABLET BY MOUTH TWICE DAILY, Disp: 90 tablet, Rfl: 0    furosemide (LASIX) 40 MG tablet, TAKE 1 TABLET(40 MG) BY MOUTH TWICE DAILY, Disp: 180 tablet, Rfl: 2    lisinopriL 10 MG tablet, Take 1 tablet (10 mg total) by mouth once daily., Disp: 90 tablet, Rfl: 3    metFORMIN (GLUCOPHAGE) 500 MG tablet, TAKE 1 TABLET(500 MG) BY MOUTH TWICE DAILY, Disp: 180 tablet, Rfl: 2    metoprolol succinate (TOPROL-XL) 100 MG 24 hr tablet, Take 1 tablet (100 mg total) by mouth once daily., Disp: 90 tablet, Rfl: 3    simvastatin (ZOCOR) 40 MG tablet, Take 1 tablet (40 mg total) by mouth every evening., Disp: 90 tablet, Rfl: 3    azelastine (ASTELIN) 137 mcg (0.1 %) nasal spray, 1 spray (137 mcg total) by Nasal route 2 (two) times daily. for 7 days (Patient not taking: Reported on 2/17/2023), Disp: 30 mL, Rfl: 0    buprenorphine-naloxone 12-3 mg Film, Place under the tongue once daily., Disp: , Rfl:     cetirizine (ZYRTEC) 10 MG tablet, Take 1 tablet (10 mg total) by mouth once daily. (Patient not taking: Reported on 2/17/2023), Disp: 30 tablet, Rfl: 2    clopidogreL (PLAVIX) 75 mg tablet, Take 1 tablet (75 mg total) by mouth once daily., Disp: 90 tablet, Rfl: 3    nitroGLYCERIN (NITROSTAT) 0.3 MG SL tablet, Place 1 tablet (0.3 mg total) under the tongue every 5 (five) minutes as needed for Chest pain., Disp: 25 tablet, Rfl: 12    ondansetron (ZOFRAN-ODT) 4 MG TbDL, Take 1 tablet (4 mg total) by mouth every 8 (eight) hours as needed (nauseau)., Disp: 30 tablet, Rfl: 0    Review of Systems   Constitutional:  Negative for unexpected weight change.   HENT:  Negative for trouble swallowing.    Eyes:  Negative for visual disturbance.   Respiratory:  Negative for shortness of breath.    Cardiovascular:  Negative for chest pain, palpitations and  "leg swelling.   Gastrointestinal:  Negative for blood in stool.   Genitourinary:  Negative for hematuria.   Skin:  Negative for rash.   Allergic/Immunologic: Negative for immunocompromised state.   Neurological:  Negative for headaches.   Hematological:  Does not bruise/bleed easily.   Psychiatric/Behavioral:  Negative for agitation. The patient is not nervous/anxious.      Objective:      /70 (BP Location: Left arm, Patient Position: Sitting, BP Method: Medium (Manual))   Pulse 67   Temp 98.1 °F (36.7 °C) (Oral)   Ht 5' 3" (1.6 m)   Wt 83.5 kg (184 lb 1.4 oz)   SpO2 96%   BMI 32.61 kg/m²   Physical Exam  Constitutional:       Appearance: She is well-developed.   HENT:      Head: Normocephalic.   Cardiovascular:      Rate and Rhythm: Normal rate and regular rhythm.      Heart sounds: Normal heart sounds.   Pulmonary:      Effort: Pulmonary effort is normal.   Musculoskeletal:         General: Normal range of motion.   Skin:     General: Skin is warm and dry.   Neurological:      Mental Status: She is alert and oriented to person, place, and time.   Psychiatric:         Behavior: Behavior normal.         Thought Content: Thought content normal.         Judgment: Judgment normal.       Assessment:       1. Annual physical exam    2. Type 2 diabetes mellitus without complication, without long-term current use of insulin    3. Hypertension associated with type 2 diabetes mellitus    4. Hyperlipidemia associated with type 2 diabetes mellitus    5. On supplemental oxygen by nasal cannula    6. Encounter for screening mammogram for malignant neoplasm of breast    7. ICD (implantable cardioverter-defibrillator) in place    8. Bipolar 1 disorder    9. Morbid (severe) obesity due to excess calories    10. PVD (peripheral vascular disease)    11. Acute on chronic diastolic congestive heart failure    12. Acute bilateral low back pain with bilateral sciatica    13. Need for Td vaccine    14. Encounter for " vaccination    15. Nausea    16. Obesity (BMI 30.0-34.9)        Plan:       Annual physical exam  -     Lipid Panel; Future; Expected date: 02/17/2023  -     TSH; Future; Expected date: 02/17/2023    Type 2 diabetes mellitus without complication, without long-term current use of insulin  -     TSH; Future; Expected date: 02/17/2023  -     Microalbumin/Creatinine Ratio, Urine; Future; Expected date: 02/17/2023  Stable, continue  management  Follow the ADA diet  Hemoglobin A1C   Date Value Ref Range Status   02/13/2022 7.0 (H) 4.0 - 5.6 % Final     Comment:     ADA Screening Guidelines:  5.7-6.4%  Consistent with prediabetes  >or=6.5%  Consistent with diabetes    High levels of fetal hemoglobin interfere with the HbA1C  assay. Heterozygous hemoglobin variants (HbS, HgC, etc)do  not significantly interfere with this assay.   However, presence of multiple variants may affect accuracy.     08/25/2021 6.3 (H) 4.0 - 5.6 % Final     Comment:     ADA Screening Guidelines:  5.7-6.4%  Consistent with prediabetes  >or=6.5%  Consistent with diabetes    High levels of fetal hemoglobin interfere with the HbA1C  assay. Heterozygous hemoglobin variants (HbS, HgC, etc)do  not significantly interfere with this assay.   However, presence of multiple variants may affect accuracy.     12/09/2015 6.3 (H) 4.5 - 6.2 % Final      Hypertension associated with type 2 diabetes mellitus  -     amLODIPine (NORVASC) 5 MG tablet; Take 1 tablet (5 mg total) by mouth once daily.  Dispense: 90 tablet; Refill: 3  -     metoprolol succinate (TOPROL-XL) 100 MG 24 hr tablet; Take 1 tablet (100 mg total) by mouth once daily.  Dispense: 90 tablet; Refill: 3  Stable, continue medicine  Low sodium diet  BP Readings from Last 3 Encounters:   02/17/23 130/70   11/08/22 137/69   04/28/22 120/70      Hyperlipidemia associated with type 2 diabetes mellitus  -     Lipid Panel; Future; Expected date: 02/17/2023  -     simvastatin (ZOCOR) 40 MG tablet; Take 1 tablet  "(40 mg total) by mouth every evening.  Dispense: 90 tablet; Refill: 3    On supplemental oxygen by nasal cannula  Stable, continue management  Encounter for screening mammogram for malignant neoplasm of breast  -     Mammo Digital Screening Bilat w/ Carlos; Future; Expected date: 02/17/2023    ICD (implantable cardioverter-defibrillator) in place  -     clopidogreL (PLAVIX) 75 mg tablet; Take 1 tablet (75 mg total) by mouth once daily.  Dispense: 90 tablet; Refill: 3    Bipolar 1 disorder  -     EScitalopram oxalate (LEXAPRO) 20 MG tablet; TAKE 1/2 TABLET BY MOUTH TWICE DAILY  Dispense: 90 tablet; Refill: 0  Morbid (severe) obesity due to excess calories  Counseled patient on his ideal body weight, health consequences of being obese and current recommendations including weekly exercise and a heart healthy diet.  Current BMI is:Estimated body mass index is 32.61 kg/m² as calculated from the following:    Height as of this encounter: 5' 3" (1.6 m).    Weight as of this encounter: 83.5 kg (184 lb 1.4 oz)..  Patient is aware that ideal BMI < 25 or Weight in (lb) to have BMI = 25: 140.8.     PVD (peripheral vascular disease)  Stable, continue management  Acute on chronic diastolic congestive heart failure  Stable, continue management  Acute bilateral low back pain with bilateral sciatica  -     X-Ray Lumbar Spine 5 View; Future; Expected date: 02/17/2023  -     X-Ray Hips Bilateral 2 View Incl AP Pelvis; Future; Expected date: 02/17/2023  Stable, continue management  Encounter for vaccination  -     (In Office Administered) Pneumococcal Conjugate Vaccine (20 Valent) (IM)    Nausea  -     ondansetron (ZOFRAN-ODT) 4 MG TbDL; Take 1 tablet (4 mg total) by mouth every 8 (eight) hours as needed (nauseau).  Dispense: 30 tablet; Refill: 0  Stable, continue management  Obesity (BMI 30.0-34.9)  Counseled patient on his ideal body weight, health consequences of being obese and current recommendations including weekly exercise and a " "heart healthy diet.  Current BMI is:Estimated body mass index is 32.61 kg/m² as calculated from the following:    Height as of this encounter: 5' 3" (1.6 m).    Weight as of this encounter: 83.5 kg (184 lb 1.4 oz)..  Patient is aware that ideal BMI < 25 or Weight in (lb) to have BMI = 25: 140.8.           Patient readiness: acceptance and barriers:none    During the course of the visit the patient was educated and counseled about the following:     Diabetes:  Discussed general issues about diabetes pathophysiology and management.  Addressed ADA diet.  Encouraged aerobic exercise.  Hypertension:   Dietary sodium restriction.  Regular aerobic exercise.  Obesity:   General weight loss/lifestyle modification strategies discussed (elicit support from others; identify saboteurs; non-food rewards, etc).  Regular aerobic exercise program discussed.    Goals: Diabetes: Maintain Hemoglobin A1C below 7, Hypertension: Reduce Blood Pressure, and Obesity: Reduce calorie intake and BMI    Did patient meet goals/outcomes: Yes    The following self management tools provided: declined    Patient Instructions (the written plan) was given to the patient/family.     Time spent with patient: 15 minutes    Barriers to medications present (no )    Adverse reactions to current medications (no)    Over the counter medications reviewed (Yes)          "

## 2023-03-02 ENCOUNTER — HOSPITAL ENCOUNTER (OUTPATIENT)
Dept: RADIOLOGY | Facility: HOSPITAL | Age: 68
Discharge: HOME | End: 2023-03-02
Attending: NURSE PRACTITIONER
Payer: MEDICARE

## 2023-03-02 DIAGNOSIS — M54.41 ACUTE BILATERAL LOW BACK PAIN WITH BILATERAL SCIATICA: ICD-10-CM

## 2023-03-02 DIAGNOSIS — M54.42 ACUTE BILATERAL LOW BACK PAIN WITH BILATERAL SCIATICA: ICD-10-CM

## 2023-03-02 PROCEDURE — 72110 X-RAY EXAM L-2 SPINE 4/>VWS: CPT | Mod: TC

## 2023-03-02 PROCEDURE — 73521 X-RAY EXAM HIPS BI 2 VIEWS: CPT | Mod: TC

## 2023-03-15 DIAGNOSIS — E11.69 HYPERLIPIDEMIA ASSOCIATED WITH TYPE 2 DIABETES MELLITUS: ICD-10-CM

## 2023-03-15 DIAGNOSIS — E11.59 HYPERTENSION ASSOCIATED WITH TYPE 2 DIABETES MELLITUS: ICD-10-CM

## 2023-03-15 DIAGNOSIS — M54.41 ACUTE BILATERAL LOW BACK PAIN WITH BILATERAL SCIATICA: Primary | ICD-10-CM

## 2023-03-15 DIAGNOSIS — E11.9 TYPE 2 DIABETES MELLITUS WITHOUT COMPLICATION, WITHOUT LONG-TERM CURRENT USE OF INSULIN: Primary | ICD-10-CM

## 2023-03-15 DIAGNOSIS — I87.8 PELVIC PHLEBOLITHIASIS: ICD-10-CM

## 2023-03-15 DIAGNOSIS — I15.2 HYPERTENSION ASSOCIATED WITH TYPE 2 DIABETES MELLITUS: ICD-10-CM

## 2023-03-15 DIAGNOSIS — M54.42 ACUTE BILATERAL LOW BACK PAIN WITH BILATERAL SCIATICA: Primary | ICD-10-CM

## 2023-03-15 DIAGNOSIS — E78.5 HYPERLIPIDEMIA ASSOCIATED WITH TYPE 2 DIABETES MELLITUS: ICD-10-CM

## 2023-03-23 NOTE — PROGRESS NOTES
Spoke with patients  about getting wife to return phone call to the office about her Xray results. He stated that she will call when she returns home.

## 2023-04-06 ENCOUNTER — HOSPITAL ENCOUNTER (OUTPATIENT)
Facility: HOSPITAL | Age: 68
Discharge: HOME | End: 2023-04-08
Attending: EMERGENCY MEDICINE | Admitting: STUDENT IN AN ORGANIZED HEALTH CARE EDUCATION/TRAINING PROGRAM
Payer: MEDICARE

## 2023-04-06 DIAGNOSIS — I50.42 CHRONIC COMBINED SYSTOLIC AND DIASTOLIC HEART FAILURE: Chronic | ICD-10-CM

## 2023-04-06 DIAGNOSIS — R07.9 CHEST PAIN: ICD-10-CM

## 2023-04-06 DIAGNOSIS — D72.829 LEUKOCYTOSIS: ICD-10-CM

## 2023-04-06 DIAGNOSIS — R11.2 INTRACTABLE NAUSEA AND VOMITING: Primary | ICD-10-CM

## 2023-04-06 DIAGNOSIS — I25.9 ISCHEMIC HEART DISEASE DUE TO CORONARY ARTERY OBSTRUCTION: ICD-10-CM

## 2023-04-06 DIAGNOSIS — I24.0 ISCHEMIC HEART DISEASE DUE TO CORONARY ARTERY OBSTRUCTION: ICD-10-CM

## 2023-04-06 DIAGNOSIS — E86.0 DEHYDRATION: ICD-10-CM

## 2023-04-06 DIAGNOSIS — F31.9 BIPOLAR 1 DISORDER: Chronic | ICD-10-CM

## 2023-04-06 PROBLEM — I50.9 ACUTE EXACERBATION OF CHF (CONGESTIVE HEART FAILURE): Status: RESOLVED | Noted: 2022-02-13 | Resolved: 2023-04-06

## 2023-04-06 LAB
ALBUMIN SERPL BCP-MCNC: 4.2 G/DL (ref 3.5–5.2)
ALP SERPL-CCNC: 50 U/L (ref 55–135)
ALT SERPL W/O P-5'-P-CCNC: 30 U/L (ref 10–44)
ANION GAP SERPL CALC-SCNC: 12 MMOL/L (ref 8–16)
AST SERPL-CCNC: 26 U/L (ref 10–40)
BACTERIA #/AREA URNS HPF: ABNORMAL /HPF
BASOPHILS # BLD AUTO: 0.04 K/UL (ref 0–0.2)
BASOPHILS NFR BLD: 0.2 % (ref 0–1.9)
BILIRUB SERPL-MCNC: 0.5 MG/DL (ref 0.1–1)
BILIRUB UR QL STRIP: NEGATIVE
BUN SERPL-MCNC: 24 MG/DL (ref 8–23)
CALCIUM SERPL-MCNC: 10.5 MG/DL (ref 8.7–10.5)
CHLORIDE SERPL-SCNC: 92 MMOL/L (ref 95–110)
CLARITY UR: CLEAR
CO2 SERPL-SCNC: 27 MMOL/L (ref 23–29)
COLOR UR: YELLOW
CREAT SERPL-MCNC: 1.3 MG/DL (ref 0.5–1.4)
DIFFERENTIAL METHOD: ABNORMAL
EOSINOPHIL # BLD AUTO: 0 K/UL (ref 0–0.5)
EOSINOPHIL NFR BLD: 0 % (ref 0–8)
ERYTHROCYTE [DISTWIDTH] IN BLOOD BY AUTOMATED COUNT: 13.2 % (ref 11.5–14.5)
EST. GFR  (NO RACE VARIABLE): 45 ML/MIN/1.73 M^2
GLUCOSE SERPL-MCNC: 213 MG/DL (ref 70–110)
GLUCOSE UR QL STRIP: NEGATIVE
HCT VFR BLD AUTO: 45.1 % (ref 37–48.5)
HGB BLD-MCNC: 15 G/DL (ref 12–16)
HGB UR QL STRIP: NEGATIVE
HYALINE CASTS #/AREA URNS LPF: 7 /LPF
IMM GRANULOCYTES # BLD AUTO: 0.19 K/UL (ref 0–0.04)
IMM GRANULOCYTES NFR BLD AUTO: 0.9 % (ref 0–0.5)
KETONES UR QL STRIP: NEGATIVE
LACTATE SERPL-SCNC: 2.2 MMOL/L (ref 0.5–2.2)
LACTATE SERPL-SCNC: 2.6 MMOL/L (ref 0.5–2.2)
LEUKOCYTE ESTERASE UR QL STRIP: NEGATIVE
LIPASE SERPL-CCNC: 32 U/L (ref 4–60)
LYMPHOCYTES # BLD AUTO: 2.4 K/UL (ref 1–4.8)
LYMPHOCYTES NFR BLD: 11.6 % (ref 18–48)
MAGNESIUM SERPL-MCNC: 1.5 MG/DL (ref 1.6–2.6)
MCH RBC QN AUTO: 27.3 PG (ref 27–31)
MCHC RBC AUTO-ENTMCNC: 33.3 G/DL (ref 32–36)
MCV RBC AUTO: 82 FL (ref 82–98)
MICROSCOPIC COMMENT: ABNORMAL
MONOCYTES # BLD AUTO: 1.8 K/UL (ref 0.3–1)
MONOCYTES NFR BLD: 8.6 % (ref 4–15)
NEUTROPHILS # BLD AUTO: 16.4 K/UL (ref 1.8–7.7)
NEUTROPHILS NFR BLD: 78.7 % (ref 38–73)
NITRITE UR QL STRIP: NEGATIVE
NRBC BLD-RTO: 0 /100 WBC
PH UR STRIP: 5 [PH] (ref 5–8)
PLATELET # BLD AUTO: 404 K/UL (ref 150–450)
PMV BLD AUTO: 10.2 FL (ref 9.2–12.9)
POCT GLUCOSE: 145 MG/DL (ref 70–110)
POCT GLUCOSE: 190 MG/DL (ref 70–110)
POTASSIUM SERPL-SCNC: 3.9 MMOL/L (ref 3.5–5.1)
PROT SERPL-MCNC: 8.9 G/DL (ref 6–8.4)
PROT UR QL STRIP: ABNORMAL
RBC # BLD AUTO: 5.5 M/UL (ref 4–5.4)
RBC #/AREA URNS HPF: 0 /HPF (ref 0–4)
SARS-COV-2 RDRP RESP QL NAA+PROBE: NEGATIVE
SODIUM SERPL-SCNC: 131 MMOL/L (ref 136–145)
SP GR UR STRIP: 1.01 (ref 1–1.03)
SQUAMOUS #/AREA URNS HPF: 3 /HPF
TROPONIN I SERPL DL<=0.01 NG/ML-MCNC: 0.01 NG/ML (ref 0–0.03)
URN SPEC COLLECT METH UR: ABNORMAL
UROBILINOGEN UR STRIP-ACNC: NEGATIVE EU/DL
WBC # BLD AUTO: 20.87 K/UL (ref 3.9–12.7)
WBC #/AREA URNS HPF: 0 /HPF (ref 0–5)

## 2023-04-06 PROCEDURE — 93010 ELECTROCARDIOGRAM REPORT: CPT | Mod: ,,, | Performed by: INTERNAL MEDICINE

## 2023-04-06 PROCEDURE — 80053 COMPREHEN METABOLIC PANEL: CPT | Performed by: EMERGENCY MEDICINE

## 2023-04-06 PROCEDURE — 99285 EMERGENCY DEPT VISIT HI MDM: CPT | Mod: 25,CS

## 2023-04-06 PROCEDURE — 85025 COMPLETE CBC W/AUTO DIFF WBC: CPT | Performed by: EMERGENCY MEDICINE

## 2023-04-06 PROCEDURE — 96361 HYDRATE IV INFUSION ADD-ON: CPT

## 2023-04-06 PROCEDURE — 36415 COLL VENOUS BLD VENIPUNCTURE: CPT | Performed by: EMERGENCY MEDICINE

## 2023-04-06 PROCEDURE — U0002 COVID-19 LAB TEST NON-CDC: HCPCS | Performed by: EMERGENCY MEDICINE

## 2023-04-06 PROCEDURE — 25000003 PHARM REV CODE 250: Performed by: STUDENT IN AN ORGANIZED HEALTH CARE EDUCATION/TRAINING PROGRAM

## 2023-04-06 PROCEDURE — 82962 GLUCOSE BLOOD TEST: CPT

## 2023-04-06 PROCEDURE — 83605 ASSAY OF LACTIC ACID: CPT | Performed by: NURSE PRACTITIONER

## 2023-04-06 PROCEDURE — 93005 ELECTROCARDIOGRAM TRACING: CPT

## 2023-04-06 PROCEDURE — 84484 ASSAY OF TROPONIN QUANT: CPT | Performed by: NURSE PRACTITIONER

## 2023-04-06 PROCEDURE — 81003 URINALYSIS AUTO W/O SCOPE: CPT | Performed by: EMERGENCY MEDICINE

## 2023-04-06 PROCEDURE — G0378 HOSPITAL OBSERVATION PER HR: HCPCS

## 2023-04-06 PROCEDURE — 36415 COLL VENOUS BLD VENIPUNCTURE: CPT | Performed by: NURSE PRACTITIONER

## 2023-04-06 PROCEDURE — 83605 ASSAY OF LACTIC ACID: CPT | Mod: 91 | Performed by: EMERGENCY MEDICINE

## 2023-04-06 PROCEDURE — 96374 THER/PROPH/DIAG INJ IV PUSH: CPT | Mod: 59

## 2023-04-06 PROCEDURE — 83690 ASSAY OF LIPASE: CPT | Performed by: NURSE PRACTITIONER

## 2023-04-06 PROCEDURE — 83036 HEMOGLOBIN GLYCOSYLATED A1C: CPT | Performed by: STUDENT IN AN ORGANIZED HEALTH CARE EDUCATION/TRAINING PROGRAM

## 2023-04-06 PROCEDURE — 81000 URINALYSIS NONAUTO W/SCOPE: CPT | Mod: 59 | Performed by: EMERGENCY MEDICINE

## 2023-04-06 PROCEDURE — 87040 BLOOD CULTURE FOR BACTERIA: CPT | Performed by: EMERGENCY MEDICINE

## 2023-04-06 PROCEDURE — 83735 ASSAY OF MAGNESIUM: CPT | Performed by: NURSE PRACTITIONER

## 2023-04-06 PROCEDURE — 63600175 PHARM REV CODE 636 W HCPCS: Performed by: EMERGENCY MEDICINE

## 2023-04-06 PROCEDURE — 25000003 PHARM REV CODE 250: Performed by: EMERGENCY MEDICINE

## 2023-04-06 PROCEDURE — 93010 EKG 12-LEAD: ICD-10-PCS | Mod: ,,, | Performed by: INTERNAL MEDICINE

## 2023-04-06 RX ORDER — SODIUM,POTASSIUM PHOSPHATES 280-250MG
2 POWDER IN PACKET (EA) ORAL
Status: DISCONTINUED | OUTPATIENT
Start: 2023-04-06 | End: 2023-04-08

## 2023-04-06 RX ORDER — ATORVASTATIN CALCIUM 20 MG/1
20 TABLET, FILM COATED ORAL DAILY
Status: DISCONTINUED | OUTPATIENT
Start: 2023-04-07 | End: 2023-04-08 | Stop reason: HOSPADM

## 2023-04-06 RX ORDER — METOPROLOL SUCCINATE 50 MG/1
100 TABLET, EXTENDED RELEASE ORAL DAILY
Status: DISCONTINUED | OUTPATIENT
Start: 2023-04-07 | End: 2023-04-08 | Stop reason: HOSPADM

## 2023-04-06 RX ORDER — ONDANSETRON 4 MG/1
8 TABLET, ORALLY DISINTEGRATING ORAL
Status: COMPLETED | OUTPATIENT
Start: 2023-04-06 | End: 2023-04-06

## 2023-04-06 RX ORDER — HYDROCODONE BITARTRATE AND ACETAMINOPHEN 10; 325 MG/1; MG/1
1 TABLET ORAL EVERY 6 HOURS PRN
Status: DISCONTINUED | OUTPATIENT
Start: 2023-04-06 | End: 2023-04-08

## 2023-04-06 RX ORDER — ESCITALOPRAM OXALATE 10 MG/1
10 TABLET ORAL DAILY
Status: DISCONTINUED | OUTPATIENT
Start: 2023-04-07 | End: 2023-04-08 | Stop reason: HOSPADM

## 2023-04-06 RX ORDER — CLOPIDOGREL BISULFATE 75 MG/1
75 TABLET ORAL DAILY
Status: DISCONTINUED | OUTPATIENT
Start: 2023-04-07 | End: 2023-04-08 | Stop reason: HOSPADM

## 2023-04-06 RX ORDER — LANOLIN ALCOHOL/MO/W.PET/CERES
800 CREAM (GRAM) TOPICAL
Status: DISCONTINUED | OUTPATIENT
Start: 2023-04-06 | End: 2023-04-08

## 2023-04-06 RX ORDER — IBUPROFEN 200 MG
16 TABLET ORAL
Status: DISCONTINUED | OUTPATIENT
Start: 2023-04-06 | End: 2023-04-08 | Stop reason: HOSPADM

## 2023-04-06 RX ORDER — SODIUM CHLORIDE 9 MG/ML
1000 INJECTION, SOLUTION INTRAVENOUS
Status: COMPLETED | OUTPATIENT
Start: 2023-04-06 | End: 2023-04-06

## 2023-04-06 RX ORDER — ONDANSETRON 2 MG/ML
4 INJECTION INTRAMUSCULAR; INTRAVENOUS EVERY 6 HOURS PRN
Status: DISCONTINUED | OUTPATIENT
Start: 2023-04-06 | End: 2023-04-08 | Stop reason: HOSPADM

## 2023-04-06 RX ORDER — IBUPROFEN 200 MG
24 TABLET ORAL
Status: DISCONTINUED | OUTPATIENT
Start: 2023-04-06 | End: 2023-04-08 | Stop reason: HOSPADM

## 2023-04-06 RX ORDER — CLONAZEPAM 0.5 MG/1
1 TABLET ORAL DAILY PRN
Status: DISCONTINUED | OUTPATIENT
Start: 2023-04-06 | End: 2023-04-08 | Stop reason: HOSPADM

## 2023-04-06 RX ORDER — LISINOPRIL 10 MG/1
10 TABLET ORAL DAILY
Status: DISCONTINUED | OUTPATIENT
Start: 2023-04-07 | End: 2023-04-08 | Stop reason: HOSPADM

## 2023-04-06 RX ORDER — ONDANSETRON 4 MG/1
8 TABLET, ORALLY DISINTEGRATING ORAL EVERY 8 HOURS PRN
Status: DISCONTINUED | OUTPATIENT
Start: 2023-04-06 | End: 2023-04-08 | Stop reason: HOSPADM

## 2023-04-06 RX ORDER — SODIUM CHLORIDE 9 MG/ML
INJECTION, SOLUTION INTRAVENOUS
Status: COMPLETED | OUTPATIENT
Start: 2023-04-06 | End: 2023-04-06

## 2023-04-06 RX ORDER — MAG HYDROX/ALUMINUM HYD/SIMETH 200-200-20
30 SUSPENSION, ORAL (FINAL DOSE FORM) ORAL 4 TIMES DAILY PRN
Status: DISCONTINUED | OUTPATIENT
Start: 2023-04-06 | End: 2023-04-08 | Stop reason: HOSPADM

## 2023-04-06 RX ORDER — ACETAMINOPHEN 325 MG/1
650 TABLET ORAL EVERY 6 HOURS PRN
Status: DISCONTINUED | OUTPATIENT
Start: 2023-04-06 | End: 2023-04-08 | Stop reason: HOSPADM

## 2023-04-06 RX ORDER — AMLODIPINE BESYLATE 5 MG/1
5 TABLET ORAL DAILY
Status: DISCONTINUED | OUTPATIENT
Start: 2023-04-07 | End: 2023-04-08 | Stop reason: HOSPADM

## 2023-04-06 RX ORDER — HYDROCODONE BITARTRATE AND ACETAMINOPHEN 5; 325 MG/1; MG/1
1 TABLET ORAL EVERY 6 HOURS PRN
Status: DISCONTINUED | OUTPATIENT
Start: 2023-04-06 | End: 2023-04-08

## 2023-04-06 RX ORDER — GLUCAGON 1 MG
1 KIT INJECTION
Status: DISCONTINUED | OUTPATIENT
Start: 2023-04-06 | End: 2023-04-08 | Stop reason: HOSPADM

## 2023-04-06 RX ORDER — SODIUM CHLORIDE 0.9 % (FLUSH) 0.9 %
10 SYRINGE (ML) INJECTION EVERY 12 HOURS PRN
Status: DISCONTINUED | OUTPATIENT
Start: 2023-04-06 | End: 2023-04-08 | Stop reason: HOSPADM

## 2023-04-06 RX ORDER — INSULIN ASPART 100 [IU]/ML
0-5 INJECTION, SOLUTION INTRAVENOUS; SUBCUTANEOUS
Status: DISCONTINUED | OUTPATIENT
Start: 2023-04-06 | End: 2023-04-08 | Stop reason: HOSPADM

## 2023-04-06 RX ORDER — TALC
6 POWDER (GRAM) TOPICAL NIGHTLY PRN
Status: DISCONTINUED | OUTPATIENT
Start: 2023-04-06 | End: 2023-04-08

## 2023-04-06 RX ORDER — NALOXONE HCL 0.4 MG/ML
0.02 VIAL (ML) INJECTION
Status: DISCONTINUED | OUTPATIENT
Start: 2023-04-06 | End: 2023-04-08

## 2023-04-06 RX ORDER — ASPIRIN 81 MG/1
81 TABLET ORAL DAILY
Status: DISCONTINUED | OUTPATIENT
Start: 2023-04-07 | End: 2023-04-08 | Stop reason: HOSPADM

## 2023-04-06 RX ORDER — METOCLOPRAMIDE HYDROCHLORIDE 5 MG/ML
10 INJECTION INTRAMUSCULAR; INTRAVENOUS
Status: COMPLETED | OUTPATIENT
Start: 2023-04-06 | End: 2023-04-06

## 2023-04-06 RX ADMIN — ONDANSETRON 8 MG: 4 TABLET, ORALLY DISINTEGRATING ORAL at 05:04

## 2023-04-06 RX ADMIN — CLONAZEPAM 1 MG: 0.5 TABLET ORAL at 09:04

## 2023-04-06 RX ADMIN — SODIUM CHLORIDE: 9 INJECTION, SOLUTION INTRAVENOUS at 02:04

## 2023-04-06 RX ADMIN — METOCLOPRAMIDE 10 MG: 5 INJECTION, SOLUTION INTRAMUSCULAR; INTRAVENOUS at 02:04

## 2023-04-06 RX ADMIN — SODIUM CHLORIDE 1000 ML: 9 INJECTION, SOLUTION INTRAVENOUS at 05:04

## 2023-04-06 NOTE — ED PROVIDER NOTES
Encounter Date: 2023    SCRIBE #1 NOTE: I, Vito Hyde, am scribing for, and in the presence of,  Garo Hall III, MD.     History     Chief Complaint   Patient presents with    Vomiting     X several days      Time seen by provider: 2:27 PM on 2023    Celine Steen is a 67 y.o. female who presents to the ED with an onset of N/V starting 4 to 5 days ago. The patient states she has rib pain and bladder incontinence when vomiting.  The patient reports having an increase in urination frequency. The patient states she has DM but cannot check her blood sugar level due to not having a device. The patient states she is still taking her medication for DM. The patient denies pain with urination, diarrhea, or any other symptoms at this time. The patient has a PMHx of CAD, HTN, DM, and CHF. The patient has a PSHx of coronary artery bypass graft and cardiac defibrillator.    The history is provided by the patient.   Review of patient's allergies indicates:  No Known Allergies  Past Medical History:   Diagnosis Date    Anxiety     CAD (coronary artery disease)     CHF (congestive heart failure)     Chronic low back pain     Diabetes 2012    Diabetes mellitus     Hyperkalemia 2022    Hypertension     Leukocytosis 2022    Metabolic acidosis with respiratory acidosis 2022     Past Surgical History:   Procedure Laterality Date    CARDIAC DEFIBRILLATOR PLACEMENT      CORONARY ARTERY BYPASS GRAFT  2007    HYSTERECTOMY       Family History   Problem Relation Age of Onset    Diabetes Mother     Mental illness Brother     Heart disease Brother         MI    Cerebral aneurysm Brother      Social History     Tobacco Use    Smoking status: Former     Types: Cigarettes     Quit date: 2018     Years since quittin.2    Smokeless tobacco: Never   Substance Use Topics    Alcohol use: No     Review of Systems   Constitutional:  Negative for fever.   Respiratory:  Negative for shortness of breath.     Gastrointestinal:  Positive for nausea and vomiting. Negative for diarrhea.   Genitourinary:  Positive for enuresis (when vomiting) and frequency. Negative for dysuria and flank pain.   Musculoskeletal:  Negative for gait problem.        Positive for rib pain when vomiting.   Neurological:  Negative for weakness.   Psychiatric/Behavioral:  Negative for confusion.      Physical Exam     Initial Vitals [04/06/23 1417]   BP Pulse Resp Temp SpO2   133/75 81 20 97.8 °F (36.6 °C) 96 %      MAP       --         Physical Exam    Nursing note and vitals reviewed.  Constitutional: She appears well-developed and well-nourished.   HENT:   Head: Normocephalic and atraumatic.   Eyes: Conjunctivae are normal.   Neck: Neck supple.   Normal range of motion.  Cardiovascular:  Normal rate, regular rhythm and normal heart sounds.     Exam reveals no gallop and no friction rub.       No murmur heard.  Pulmonary/Chest: Effort normal and breath sounds normal. No respiratory distress. She has no wheezes. She has no rhonchi. She has no rales.   Abdominal: Abdomen is soft. She exhibits no distension. There is no abdominal tenderness.   Musculoskeletal:         General: Normal range of motion.      Cervical back: Normal range of motion and neck supple.     Neurological: She is alert and oriented to person, place, and time.   Skin: Skin is warm and dry. No erythema.   Psychiatric: She has a normal mood and affect.       ED Course   Procedures  Labs Reviewed   CBC W/ AUTO DIFFERENTIAL - Abnormal; Notable for the following components:       Result Value    WBC 20.87 (*)     RBC 5.50 (*)     Immature Granulocytes 0.9 (*)     Gran # (ANC) 16.4 (*)     Immature Grans (Abs) 0.19 (*)     Mono # 1.8 (*)     Gran % 78.7 (*)     Lymph % 11.6 (*)     All other components within normal limits    Narrative:     Release to patient->Immediate   COMPREHENSIVE METABOLIC PANEL - Abnormal; Notable for the following components:    Sodium 131 (*)     Chloride 92  (*)     Glucose 213 (*)     BUN 24 (*)     Total Protein 8.9 (*)     Alkaline Phosphatase 50 (*)     eGFR 45 (*)     All other components within normal limits    Narrative:     Release to patient->Immediate   URINALYSIS, REFLEX TO URINE CULTURE - Abnormal; Notable for the following components:    Protein, UA 1+ (*)     All other components within normal limits    Narrative:     Specimen Source->Urine   URINALYSIS MICROSCOPIC - Abnormal; Notable for the following components:    Hyaline Casts, UA 7 (*)     All other components within normal limits    Narrative:     Specimen Source->Urine   LACTIC ACID, PLASMA - Abnormal; Notable for the following components:    Lactate (Lactic Acid) 2.6 (*)     All other components within normal limits   MAGNESIUM - Abnormal; Notable for the following components:    Magnesium 1.5 (*)     All other components within normal limits   POCT GLUCOSE - Abnormal; Notable for the following components:    POCT Glucose 190 (*)     All other components within normal limits   CULTURE, BLOOD   CULTURE, BLOOD   SARS-COV-2 RNA AMPLIFICATION, QUAL   LIPASE   POCT GLUCOSE MONITORING CONTINUOUS   POCT GLUCOSE MONITORING CONTINUOUS          Imaging Results              X-Ray Chest AP Portable (Final result)  Result time 04/06/23 17:55:28      Final result by Luis Fajardo MD (04/06/23 17:55:28)                   Impression:      No acute cardiopulmonary process.      Electronically signed by: Luis Fajardo  Date:    04/06/2023  Time:    17:55               Narrative:    EXAMINATION:  XR CHEST AP PORTABLE    CLINICAL HISTORY:  Elevated white blood cell count, unspecified    TECHNIQUE:  Single frontal view of the chest was performed.    COMPARISON:  Radiographs 02/13/2022.    FINDINGS:  Redemonstrated median sternotomy changes and cardiac device.  Cardiomediastinal silhouette is within normal limits.  Lungs are well expanded and clear.  No focal consolidation, pneumothorax, or pleural effusion.  No  significant/focal bony abnormality.                                       Medications   glucose chewable tablet 16 g (has no administration in time range)   glucose chewable tablet 24 g (has no administration in time range)   glucagon (human recombinant) injection 1 mg (has no administration in time range)   dextrose 10% bolus 125 mL 125 mL (has no administration in time range)   dextrose 10% bolus 250 mL 250 mL (has no administration in time range)   insulin aspart U-100 pen 0-5 Units (has no administration in time range)   sodium chloride 0.9% flush 10 mL (has no administration in time range)   melatonin tablet 6 mg (has no administration in time range)   ondansetron disintegrating tablet 8 mg (has no administration in time range)   acetaminophen tablet 650 mg (has no administration in time range)   aluminum-magnesium hydroxide-simethicone 200-200-20 mg/5 mL suspension 30 mL (has no administration in time range)   HYDROcodone-acetaminophen 5-325 mg per tablet 1 tablet (has no administration in time range)   HYDROcodone-acetaminophen  mg per tablet 1 tablet (has no administration in time range)   naloxone 0.4 mg/mL injection 0.02 mg (has no administration in time range)   potassium bicarbonate disintegrating tablet 50 mEq (has no administration in time range)   potassium bicarbonate disintegrating tablet 35 mEq (has no administration in time range)   potassium bicarbonate disintegrating tablet 60 mEq (has no administration in time range)   magnesium oxide tablet 800 mg (has no administration in time range)   magnesium oxide tablet 800 mg (has no administration in time range)   potassium, sodium phosphates 280-160-250 mg packet 2 packet (has no administration in time range)   potassium, sodium phosphates 280-160-250 mg packet 2 packet (has no administration in time range)   potassium, sodium phosphates 280-160-250 mg packet 2 packet (has no administration in time range)   ondansetron injection 4 mg (has no  administration in time range)   amLODIPine tablet 5 mg (has no administration in time range)   aspirin EC tablet 81 mg (has no administration in time range)   clonazePAM tablet 1 mg (1 mg Oral Given 4/6/23 2139)   clopidogreL tablet 75 mg (has no administration in time range)   EScitalopram oxalate tablet 10 mg (has no administration in time range)   lisinopriL tablet 10 mg (has no administration in time range)   metoprolol succinate (TOPROL-XL) 24 hr tablet 100 mg (has no administration in time range)   atorvastatin tablet 20 mg (has no administration in time range)   0.9%  NaCl infusion (0 mL/hr Intravenous Stopped 4/6/23 1646)   metoclopramide HCl injection 10 mg (10 mg Intravenous Given 4/6/23 1445)   0.9%  NaCl infusion (0 mLs Intravenous Stopped 4/6/23 1755)   ondansetron disintegrating tablet 8 mg (8 mg Oral Given 4/6/23 1733)     Medical Decision Making:   History:   Old Medical Records: I decided to obtain old medical records.  Clinical Tests:   Lab Tests: Ordered and Reviewed  ED Management:  67-year-old female presents with a 4-5 day history of persistent nausea vomiting.  She has no abdominal pain or tenderness with bowel obstruction unlikely.  The absence of pain or tenderness also makes bowel ischemia unlikely.  She has required 2 intravenous antiemetics and will be admitted for observation and hydration.  She has leukocytosis which most likely is a result of hemoconcentration; however, repeat will be obtained.  Blood cultur are submitted.  Other:   I have discussed this case with another health care provider.     APC / Resident Notes:   I, Dr. Garo Hall III, personally performed the services described in this documentation. All medical record entries made by the scribe were at my direction and in my presence.  I have reviewed the chart and agree that the record reflects my personal performance and is accurate and complete   Scribe Attestation:   Scribe #1: I performed the above scribed service and  the documentation accurately describes the services I performed. I attest to the accuracy of the note.                   Clinical Impression:   Final diagnoses:  [D72.829] Leukocytosis  [E86.0] Dehydration (Primary)  [R11.2] Intractable nausea and vomiting        ED Disposition Condition    Observation                 Garo Hall III, MD  04/06/23 5881

## 2023-04-07 PROBLEM — E87.20 LACTIC ACIDOSIS: Status: ACTIVE | Noted: 2023-04-07

## 2023-04-07 LAB
ALBUMIN SERPL BCP-MCNC: 3.5 G/DL (ref 3.5–5.2)
ALP SERPL-CCNC: 50 U/L (ref 55–135)
ALT SERPL W/O P-5'-P-CCNC: 27 U/L (ref 10–44)
ANION GAP SERPL CALC-SCNC: 12 MMOL/L (ref 8–16)
AST SERPL-CCNC: 32 U/L (ref 10–40)
BASOPHILS # BLD AUTO: 0.03 K/UL (ref 0–0.2)
BASOPHILS NFR BLD: 0.2 % (ref 0–1.9)
BILIRUB SERPL-MCNC: 0.5 MG/DL (ref 0.1–1)
BUN SERPL-MCNC: 18 MG/DL (ref 8–23)
CALCIUM SERPL-MCNC: 9.3 MG/DL (ref 8.7–10.5)
CHLORIDE SERPL-SCNC: 102 MMOL/L (ref 95–110)
CO2 SERPL-SCNC: 22 MMOL/L (ref 23–29)
CREAT SERPL-MCNC: 0.9 MG/DL (ref 0.5–1.4)
DIFFERENTIAL METHOD: ABNORMAL
EOSINOPHIL # BLD AUTO: 0 K/UL (ref 0–0.5)
EOSINOPHIL NFR BLD: 0.1 % (ref 0–8)
ERYTHROCYTE [DISTWIDTH] IN BLOOD BY AUTOMATED COUNT: 13.2 % (ref 11.5–14.5)
EST. GFR  (NO RACE VARIABLE): >60 ML/MIN/1.73 M^2
ESTIMATED AVG GLUCOSE: 197 MG/DL (ref 68–131)
GLUCOSE SERPL-MCNC: 146 MG/DL (ref 70–110)
HBA1C MFR BLD: 8.5 % (ref 4–5.6)
HCT VFR BLD AUTO: 40.7 % (ref 37–48.5)
HGB BLD-MCNC: 13.4 G/DL (ref 12–16)
IMM GRANULOCYTES # BLD AUTO: 0.09 K/UL (ref 0–0.04)
IMM GRANULOCYTES NFR BLD AUTO: 0.5 % (ref 0–0.5)
LYMPHOCYTES # BLD AUTO: 3.2 K/UL (ref 1–4.8)
LYMPHOCYTES NFR BLD: 18.4 % (ref 18–48)
MCH RBC QN AUTO: 27.5 PG (ref 27–31)
MCHC RBC AUTO-ENTMCNC: 32.9 G/DL (ref 32–36)
MCV RBC AUTO: 83 FL (ref 82–98)
MONOCYTES # BLD AUTO: 2.1 K/UL (ref 0.3–1)
MONOCYTES NFR BLD: 12.1 % (ref 4–15)
NEUTROPHILS # BLD AUTO: 11.8 K/UL (ref 1.8–7.7)
NEUTROPHILS NFR BLD: 68.7 % (ref 38–73)
NRBC BLD-RTO: 0 /100 WBC
PLATELET # BLD AUTO: 341 K/UL (ref 150–450)
PMV BLD AUTO: 10.1 FL (ref 9.2–12.9)
POCT GLUCOSE: 143 MG/DL (ref 70–110)
POCT GLUCOSE: 153 MG/DL (ref 70–110)
POCT GLUCOSE: 161 MG/DL (ref 70–110)
POCT GLUCOSE: 220 MG/DL (ref 70–110)
POTASSIUM SERPL-SCNC: 3.4 MMOL/L (ref 3.5–5.1)
PROT SERPL-MCNC: 7.4 G/DL (ref 6–8.4)
RBC # BLD AUTO: 4.88 M/UL (ref 4–5.4)
SODIUM SERPL-SCNC: 136 MMOL/L (ref 136–145)
WBC # BLD AUTO: 17.21 K/UL (ref 3.9–12.7)

## 2023-04-07 PROCEDURE — 25000003 PHARM REV CODE 250: Performed by: STUDENT IN AN ORGANIZED HEALTH CARE EDUCATION/TRAINING PROGRAM

## 2023-04-07 PROCEDURE — 63600175 PHARM REV CODE 636 W HCPCS: Performed by: STUDENT IN AN ORGANIZED HEALTH CARE EDUCATION/TRAINING PROGRAM

## 2023-04-07 PROCEDURE — G0378 HOSPITAL OBSERVATION PER HR: HCPCS

## 2023-04-07 PROCEDURE — 80053 COMPREHEN METABOLIC PANEL: CPT | Performed by: STUDENT IN AN ORGANIZED HEALTH CARE EDUCATION/TRAINING PROGRAM

## 2023-04-07 PROCEDURE — 36415 COLL VENOUS BLD VENIPUNCTURE: CPT | Performed by: STUDENT IN AN ORGANIZED HEALTH CARE EDUCATION/TRAINING PROGRAM

## 2023-04-07 PROCEDURE — 96366 THER/PROPH/DIAG IV INF ADDON: CPT

## 2023-04-07 PROCEDURE — 96372 THER/PROPH/DIAG INJ SC/IM: CPT | Mod: 59 | Performed by: STUDENT IN AN ORGANIZED HEALTH CARE EDUCATION/TRAINING PROGRAM

## 2023-04-07 PROCEDURE — 85025 COMPLETE CBC W/AUTO DIFF WBC: CPT | Performed by: STUDENT IN AN ORGANIZED HEALTH CARE EDUCATION/TRAINING PROGRAM

## 2023-04-07 PROCEDURE — 96365 THER/PROPH/DIAG IV INF INIT: CPT

## 2023-04-07 PROCEDURE — 63600175 PHARM REV CODE 636 W HCPCS: Performed by: NURSE PRACTITIONER

## 2023-04-07 RX ORDER — MAGNESIUM SULFATE HEPTAHYDRATE 40 MG/ML
2 INJECTION, SOLUTION INTRAVENOUS ONCE
Status: COMPLETED | OUTPATIENT
Start: 2023-04-07 | End: 2023-04-07

## 2023-04-07 RX ORDER — SODIUM CHLORIDE, SODIUM LACTATE, POTASSIUM CHLORIDE, CALCIUM CHLORIDE 600; 310; 30; 20 MG/100ML; MG/100ML; MG/100ML; MG/100ML
INJECTION, SOLUTION INTRAVENOUS ONCE
Status: COMPLETED | OUTPATIENT
Start: 2023-04-07 | End: 2023-04-07

## 2023-04-07 RX ADMIN — HYDROCODONE BITARTRATE AND ACETAMINOPHEN 1 TABLET: 5; 325 TABLET ORAL at 05:04

## 2023-04-07 RX ADMIN — ASPIRIN 81 MG: 81 TABLET, COATED ORAL at 10:04

## 2023-04-07 RX ADMIN — SODIUM CHLORIDE, POTASSIUM CHLORIDE, SODIUM LACTATE AND CALCIUM CHLORIDE: 600; 310; 30; 20 INJECTION, SOLUTION INTRAVENOUS at 01:04

## 2023-04-07 RX ADMIN — INSULIN ASPART 1 UNITS: 100 INJECTION, SOLUTION INTRAVENOUS; SUBCUTANEOUS at 08:04

## 2023-04-07 RX ADMIN — LISINOPRIL 10 MG: 10 TABLET ORAL at 10:04

## 2023-04-07 RX ADMIN — METOPROLOL SUCCINATE 100 MG: 50 TABLET, EXTENDED RELEASE ORAL at 10:04

## 2023-04-07 RX ADMIN — ONDANSETRON 8 MG: 4 TABLET, ORALLY DISINTEGRATING ORAL at 02:04

## 2023-04-07 RX ADMIN — MAGNESIUM SULFATE 2 G: 2 INJECTION INTRAVENOUS at 01:04

## 2023-04-07 RX ADMIN — CLOPIDOGREL BISULFATE 75 MG: 75 TABLET, FILM COATED ORAL at 09:04

## 2023-04-07 RX ADMIN — HYDROCODONE BITARTRATE AND ACETAMINOPHEN 1 TABLET: 10; 325 TABLET ORAL at 11:04

## 2023-04-07 RX ADMIN — ATORVASTATIN CALCIUM 20 MG: 20 TABLET, FILM COATED ORAL at 10:04

## 2023-04-07 RX ADMIN — ESCITALOPRAM OXALATE 10 MG: 10 TABLET ORAL at 10:04

## 2023-04-07 RX ADMIN — AMLODIPINE BESYLATE 5 MG: 5 TABLET ORAL at 10:04

## 2023-04-07 RX ADMIN — POTASSIUM BICARBONATE 40 MEQ: 391 TABLET, EFFERVESCENT ORAL at 10:04

## 2023-04-07 NOTE — ASSESSMENT & PLAN NOTE
Patient's FSGs are controlled on current medication regimen.  Last A1c reviewed-   Lab Results   Component Value Date    HGBA1C 7.0 (H) 02/13/2022     Most recent fingerstick glucose reviewed-   Recent Labs   Lab 04/06/23  1432 04/06/23 2033   POCTGLUCOSE 190* 145*     Current correctional scale  Low  Maintain anti-hyperglycemic dose as follows-   Antihyperglycemics (From admission, onward)    Start     Stop Route Frequency Ordered    04/06/23 1958  insulin aspart U-100 pen 0-5 Units         -- SubQ Before meals & nightly PRN 04/06/23 1958        Hold Oral hypoglycemics while patient is in the hospital.

## 2023-04-07 NOTE — H&P
[facility] Hospital Medicine  History & Physical    Patient Name: Celine Steen  MRN: [id number]  Patient Class: OP- Observation  Admission Date: 4/6/2023  Attending Physician: Dr. Davis  Primary Care Provider: Josseline Vargas NP         Patient information was obtained from patient, caregiver / friend, past medical records and ER records.     Subjective:     Principal Problem:Intractable nausea and vomiting    Chief Complaint:   Chief Complaint   Patient presents with    Vomiting     X several days         HPI: Ms. Steen is a 67 year old female with a history of CAD, HFpEF 53%, AICD, s/p CABG, DM, HTN, HLD who presents today with complaints of N/V. It is severe. It is associated with anorexia, fatigue, and weakness. She denies abd pain, chest pain, SOB, dizziness, fever, chills, diarrhea, hematemesis, melena, or LOC. Symptoms began 4 days ago. She reports nonbloody bilious vomit. She had a normal BM yesterday that was soft and brown. She does appreciate any preceding abd pain, but states her ribs hurt now from vomiting. She has no new medication changes. No known contacts with similar symptoms. She states this happened to her several years ago, but she states they could never figure out the underlying cause. She was given 2 doses with IV antiemetics in the ED with continued nausea. Creatinine 1.3 with prior 0.9, WBC 20K, lactate 2.6, lipase 32. Covid is negative, urine with protein and minimal hyaline casts without infection. Hospital medicine is consulted for admission for further work up and plan.       Past Medical History:   Diagnosis Date    Anxiety     CAD (coronary artery disease)     CHF (congestive heart failure)     Chronic low back pain     Diabetes 2/1/2012    Diabetes mellitus     Hyperkalemia 2/13/2022    Hypertension     Leukocytosis 2/13/2022    Metabolic acidosis with respiratory acidosis 2/13/2022       Past Surgical History:   Procedure Laterality Date     CARDIAC DEFIBRILLATOR PLACEMENT  2007    CORONARY ARTERY BYPASS GRAFT  2007    HYSTERECTOMY         Review of patient's allergies indicates:  No Known Allergies    No current facility-administered medications on file prior to encounter.     Current Outpatient Medications on File Prior to Encounter   Medication Sig    amLODIPine (NORVASC) 5 MG tablet Take 1 tablet (5 mg total) by mouth once daily.    aspirin (ECOTRIN) 81 MG EC tablet Take 81 mg by mouth once daily.    azelastine (ASTELIN) 137 mcg (0.1 %) nasal spray 1 spray (137 mcg total) by Nasal route 2 (two) times daily. for 7 days    clonazePAM (KLONOPIN) 1 MG tablet Take 1 mg by mouth daily as needed.    clopidogreL (PLAVIX) 75 mg tablet Take 1 tablet (75 mg total) by mouth once daily.    EScitalopram oxalate (LEXAPRO) 20 MG tablet TAKE 1/2 TABLET BY MOUTH TWICE DAILY    furosemide (LASIX) 40 MG tablet TAKE 1 TABLET(40 MG) BY MOUTH TWICE DAILY    lisinopriL 10 MG tablet Take 1 tablet (10 mg total) by mouth once daily.    metFORMIN (GLUCOPHAGE) 500 MG tablet TAKE 1 TABLET(500 MG) BY MOUTH TWICE DAILY    metoprolol succinate (TOPROL-XL) 100 MG 24 hr tablet Take 1 tablet (100 mg total) by mouth once daily.    nitroGLYCERIN (NITROSTAT) 0.3 MG SL tablet Place 1 tablet (0.3 mg total) under the tongue every 5 (five) minutes as needed for Chest pain.    ondansetron (ZOFRAN-ODT) 4 MG TbDL Take 1 tablet (4 mg total) by mouth every 8 (eight) hours as needed (nauseau).    simvastatin (ZOCOR) 40 MG tablet Take 1 tablet (40 mg total) by mouth every evening.    cetirizine (ZYRTEC) 10 MG tablet Take 1 tablet (10 mg total) by mouth once daily. (Patient not taking: Reported on 2/17/2023)    coenzyme Q10 200 mg capsule Take 200 mg by mouth once daily.     Family History       Problem Relation (Age of Onset)    Cerebral aneurysm Brother    Diabetes Mother    Heart disease Brother    Mental illness Brother          Tobacco Use    Smoking status: Former      Types: Cigarettes     Quit date:      Years since quittin.2    Smokeless tobacco: Never   Substance and Sexual Activity    Alcohol use: No    Drug use: Not on file    Sexual activity: Not on file     Review of Systems   Constitutional:  Positive for appetite change and fatigue. Negative for chills, diaphoresis, fever and unexpected weight change.   HENT:  Negative for congestion, ear pain, facial swelling, hearing loss, sore throat and trouble swallowing.    Eyes:  Negative for pain and discharge.   Respiratory:  Negative for cough, chest tightness, shortness of breath and wheezing.    Cardiovascular:  Negative for chest pain, palpitations and leg swelling.   Gastrointestinal:  Positive for nausea and vomiting. Negative for abdominal pain, blood in stool and diarrhea.   Endocrine: Negative for polydipsia, polyphagia and polyuria.   Genitourinary:  Negative for difficulty urinating, dysuria, flank pain, frequency and urgency.   Musculoskeletal:  Negative for arthralgias, back pain, joint swelling, neck pain and neck stiffness.   Skin:  Negative for rash and wound.   Allergic/Immunologic: Negative for environmental allergies and immunocompromised state.   Neurological:  Positive for weakness. Negative for dizziness, seizures, syncope, speech difficulty, light-headedness, numbness and headaches.   Hematological:  Negative for adenopathy.   Psychiatric/Behavioral:  Negative for sleep disturbance and suicidal ideas. The patient is not nervous/anxious.    All other systems reviewed and are negative.  Objective:     Vital Signs (Most Recent):  Temp: 98.4 °F (36.9 °C) (23)  Pulse: 75 (23)  Resp: 17 (23)  BP: (!) 144/65 (23)  SpO2: (!) 94 % (23)   Vital Signs (24h Range):  Temp:  [97.8 °F (36.6 °C)-98.4 °F (36.9 °C)] 98.4 °F (36.9 °C)  Pulse:  [68-81] 75  Resp:  [17-20] 17  SpO2:  [93 %-97 %] 94 %  BP: (133-190)/(65-96) 144/65     Weight: 81.6 kg (180  lb)  Body mass index is 31.89 kg/m².    Physical Exam  Vitals and nursing note reviewed.   Constitutional:       Appearance: She is obese. She is ill-appearing.   HENT:      Head: Normocephalic and atraumatic.      Nose: Nose normal.      Mouth/Throat:      Mouth: Mucous membranes are dry.      Pharynx: Oropharynx is clear.   Eyes:      Extraocular Movements: Extraocular movements intact.      Pupils: Pupils are equal, round, and reactive to light.   Cardiovascular:      Rate and Rhythm: Normal rate and regular rhythm.      Pulses: Normal pulses.   Pulmonary:      Effort: Pulmonary effort is normal.      Breath sounds: Normal breath sounds.   Abdominal:      General: Bowel sounds are normal. There is no distension.      Palpations: Abdomen is soft.      Tenderness: There is no abdominal tenderness.   Musculoskeletal:         General: Normal range of motion.      Cervical back: Normal range of motion and neck supple.   Skin:     General: Skin is warm and dry.      Capillary Refill: Capillary refill takes less than 2 seconds.   Neurological:      General: No focal deficit present.      Mental Status: She is alert and oriented to person, place, and time.   Psychiatric:         Mood and Affect: Mood normal.         Behavior: Behavior normal.         CRANIAL NERVES     CN III, IV, VI   Pupils are equal, round, and reactive to light.     Significant Labs: All pertinent labs within the past 24 hours have been reviewed.  CBC:   Recent Labs   Lab 04/06/23  1441   WBC 20.87*   HGB 15.0   HCT 45.1        CMP:   Recent Labs   Lab 04/06/23  1442   *   K 3.9   CL 92*   CO2 27   *   BUN 24*   CREATININE 1.3   CALCIUM 10.5   PROT 8.9*   ALBUMIN 4.2   BILITOT 0.5   ALKPHOS 50*   AST 26   ALT 30   ANIONGAP 12     Magnesium:   Recent Labs   Lab 04/06/23  1935   MG 1.5*     Troponin:   Recent Labs   Lab 04/06/23  1935   TROPONINI 0.015       Significant Imaging: I have reviewed all pertinent imaging results/findings  within the past 24 hours.  EKG: I have reviewed all pertinent results/findings within the past 24 hours and my personal findings are: normal sinus rhythm, previous septal infarct    X-Ray Chest AP Portable    Result Date: 4/6/2023  EXAMINATION: XR CHEST AP PORTABLE CLINICAL HISTORY: Elevated white blood cell count, unspecified TECHNIQUE: Single frontal view of the chest was performed. COMPARISON: Radiographs 02/13/2022. FINDINGS: Redemonstrated median sternotomy changes and cardiac device.  Cardiomediastinal silhouette is within normal limits.  Lungs are well expanded and clear.  No focal consolidation, pneumothorax, or pleural effusion.  No significant/focal bony abnormality.     No acute cardiopulmonary process. Electronically signed by: Luis Fajardo Date:    04/06/2023 Time:    17:55       Assessment/Plan:     * Intractable nausea and vomiting  Admit to med/tele  Consult GI  Will hold NPO after midnight in the event she requires further testing - (EGD/gastric emptying)  abd benign on exam, reports no pain  H/o DM - gastroparesis?  Does not use marijuana or other recreational drugs, doesn't currently drink alcohol  IV antiemetics   Gentle IV hydration given HF     Lactic acidosis  Hold metformin   IV hydration  Repeat lactate   Low suspicion for bowel ischemia given no pain      Chronic combined systolic and diastolic heart failure  Daily wt/accurate I&O  Continue appropriate home meds      Leukocytosis  May be reactive to several day of N/V  Repeat in AM, further work up as per clinical course      ICD (implantable cardioverter-defibrillator) in place  aware    Bipolar 1 disorder  Continue appropriate home meds       Type 2 diabetes mellitus with circulatory disorder  Patient's FSGs are controlled on current medication regimen.  Last A1c reviewed-   Lab Results   Component Value Date    HGBA1C 7.0 (H) 02/13/2022     Most recent fingerstick glucose reviewed-   Recent Labs   Lab 04/06/23  1432 04/06/23 2033    POCTGLUCOSE 190* 145*     Current correctional scale  Low  Maintain anti-hyperglycemic dose as follows-   Antihyperglycemics (From admission, onward)    Start     Stop Route Frequency Ordered    04/06/23 1958  insulin aspart U-100 pen 0-5 Units         -- SubQ Before meals & nightly PRN 04/06/23 1958        Hold Oral hypoglycemics while patient is in the hospital.    Essential hypertension  Continue appropriate home meds      CAD (coronary artery disease)  Continue appropriate home meds        VTE Risk Mitigation (From admission, onward)         Ordered     IP VTE HIGH RISK PATIENT  Once         04/06/23 1958     Place sequential compression device  Until discontinued         04/06/23 1958                     On 04/07/2023, patient should be placed in hospital observation services under my care in collaboration with Dr. Davis.      Diana Hunter NP  Department of Hospital Medicine  [facility]

## 2023-04-07 NOTE — PLAN OF CARE
04/07/23 1452   [facility] Message   Medicare Outpatient and Observation Notification regarding financial responsibility Explained to patient/caregiver;Signed/date by patient/caregiver   Date MUHAMMAD was signed 04/07/23   Time MUHAMMAD was signed 1200

## 2023-04-07 NOTE — ASSESSMENT & PLAN NOTE
Chronic, stable  - continue acei and beta blocker  - hold home lasix due to likely dehydration on admit; resume when appropriate  - monitor fluid status

## 2023-04-07 NOTE — ASSESSMENT & PLAN NOTE
Admit to med/tele  Consult GI  Will hold NPO after midnight in the event she requires further testing - (EGD/gastric emptying)  abd benign on exam, reports no pain  H/o DM - gastroparesis?  Does not use marijuana or other recreational drugs, doesn't currently drink alcohol  IV antiemetics   Gentle IV hydration given HF

## 2023-04-07 NOTE — HPI
Ms. Steen is a 67 year old female with a history of CAD, HFpEF 53%, AICD, s/p CABG, DM, HTN, HLD who presents today with complaints of N/V. It is severe. It is associated with anorexia, fatigue, and weakness. She denies abd pain, chest pain, SOB, dizziness, fever, chills, diarrhea, hematemesis, melena, or LOC. Symptoms began 4 days ago. She reports nonbloody bilious vomit. She had a normal BM yesterday that was soft and brown. She does appreciate any preceding abd pain, but states her ribs hurt now from vomiting. She has no new medication changes. No known contacts with similar symptoms. She states this happened to her several years ago, but she states they could never figure out the underlying cause. She was given 2 doses with IV antiemetics in the ED with continued nausea. Creatinine 1.3 with prior 0.9, WBC 20K, lactate 2.6, lipase 32. Covid is negative, urine with protein and minimal hyaline casts without infection. Hospital medicine is consulted for admission for further work up and plan.

## 2023-04-07 NOTE — SUBJECTIVE & OBJECTIVE
Past Medical History:   Diagnosis Date    Anxiety     CAD (coronary artery disease)     CHF (congestive heart failure)     Chronic low back pain     Diabetes 2/1/2012    Diabetes mellitus     Hyperkalemia 2/13/2022    Hypertension     Leukocytosis 2/13/2022    Metabolic acidosis with respiratory acidosis 2/13/2022       Past Surgical History:   Procedure Laterality Date    CARDIAC DEFIBRILLATOR PLACEMENT  2007    CORONARY ARTERY BYPASS GRAFT  2007    HYSTERECTOMY         Review of patient's allergies indicates:  No Known Allergies    No current facility-administered medications on file prior to encounter.     Current Outpatient Medications on File Prior to Encounter   Medication Sig    amLODIPine (NORVASC) 5 MG tablet Take 1 tablet (5 mg total) by mouth once daily.    aspirin (ECOTRIN) 81 MG EC tablet Take 81 mg by mouth once daily.    azelastine (ASTELIN) 137 mcg (0.1 %) nasal spray 1 spray (137 mcg total) by Nasal route 2 (two) times daily. for 7 days    clonazePAM (KLONOPIN) 1 MG tablet Take 1 mg by mouth daily as needed.    clopidogreL (PLAVIX) 75 mg tablet Take 1 tablet (75 mg total) by mouth once daily.    EScitalopram oxalate (LEXAPRO) 20 MG tablet TAKE 1/2 TABLET BY MOUTH TWICE DAILY    furosemide (LASIX) 40 MG tablet TAKE 1 TABLET(40 MG) BY MOUTH TWICE DAILY    lisinopriL 10 MG tablet Take 1 tablet (10 mg total) by mouth once daily.    metFORMIN (GLUCOPHAGE) 500 MG tablet TAKE 1 TABLET(500 MG) BY MOUTH TWICE DAILY    metoprolol succinate (TOPROL-XL) 100 MG 24 hr tablet Take 1 tablet (100 mg total) by mouth once daily.    nitroGLYCERIN (NITROSTAT) 0.3 MG SL tablet Place 1 tablet (0.3 mg total) under the tongue every 5 (five) minutes as needed for Chest pain.    ondansetron (ZOFRAN-ODT) 4 MG TbDL Take 1 tablet (4 mg total) by mouth every 8 (eight) hours as needed (nauseau).    simvastatin (ZOCOR) 40 MG tablet Take 1 tablet (40 mg total) by mouth every evening.    cetirizine (ZYRTEC) 10 MG tablet Take 1 tablet  (10 mg total) by mouth once daily. (Patient not taking: Reported on 2023)    coenzyme Q10 200 mg capsule Take 200 mg by mouth once daily.     Family History       Problem Relation (Age of Onset)    Cerebral aneurysm Brother    Diabetes Mother    Heart disease Brother    Mental illness Brother          Tobacco Use    Smoking status: Former     Types: Cigarettes     Quit date:      Years since quittin.2    Smokeless tobacco: Never   Substance and Sexual Activity    Alcohol use: No    Drug use: Not on file    Sexual activity: Not on file     Review of Systems   Constitutional:  Positive for appetite change and fatigue. Negative for chills, diaphoresis, fever and unexpected weight change.   HENT:  Negative for congestion, ear pain, facial swelling, hearing loss, sore throat and trouble swallowing.    Eyes:  Negative for pain and discharge.   Respiratory:  Negative for cough, chest tightness, shortness of breath and wheezing.    Cardiovascular:  Negative for chest pain, palpitations and leg swelling.   Gastrointestinal:  Positive for nausea and vomiting. Negative for abdominal pain, blood in stool and diarrhea.   Endocrine: Negative for polydipsia, polyphagia and polyuria.   Genitourinary:  Negative for difficulty urinating, dysuria, flank pain, frequency and urgency.   Musculoskeletal:  Negative for arthralgias, back pain, joint swelling, neck pain and neck stiffness.   Skin:  Negative for rash and wound.   Allergic/Immunologic: Negative for environmental allergies and immunocompromised state.   Neurological:  Positive for weakness. Negative for dizziness, seizures, syncope, speech difficulty, light-headedness, numbness and headaches.   Hematological:  Negative for adenopathy.   Psychiatric/Behavioral:  Negative for sleep disturbance and suicidal ideas. The patient is not nervous/anxious.    All other systems reviewed and are negative.  Objective:     Vital Signs (Most Recent):  Temp: 98.4 °F (36.9 °C)  (04/06/23 2359)  Pulse: 75 (04/06/23 2359)  Resp: 17 (04/06/23 2359)  BP: (!) 144/65 (04/06/23 2359)  SpO2: (!) 94 % (04/06/23 2359)   Vital Signs (24h Range):  Temp:  [97.8 °F (36.6 °C)-98.4 °F (36.9 °C)] 98.4 °F (36.9 °C)  Pulse:  [68-81] 75  Resp:  [17-20] 17  SpO2:  [93 %-97 %] 94 %  BP: (133-190)/(65-96) 144/65     Weight: 81.6 kg (180 lb)  Body mass index is 31.89 kg/m².    Physical Exam  Vitals and nursing note reviewed.   Constitutional:       Appearance: She is obese. She is ill-appearing.   HENT:      Head: Normocephalic and atraumatic.      Nose: Nose normal.      Mouth/Throat:      Mouth: Mucous membranes are dry.      Pharynx: Oropharynx is clear.   Eyes:      Extraocular Movements: Extraocular movements intact.      Pupils: Pupils are equal, round, and reactive to light.   Cardiovascular:      Rate and Rhythm: Normal rate and regular rhythm.      Pulses: Normal pulses.   Pulmonary:      Effort: Pulmonary effort is normal.      Breath sounds: Normal breath sounds.   Abdominal:      General: Bowel sounds are normal. There is no distension.      Palpations: Abdomen is soft.      Tenderness: There is no abdominal tenderness.   Musculoskeletal:         General: Normal range of motion.      Cervical back: Normal range of motion and neck supple.   Skin:     General: Skin is warm and dry.      Capillary Refill: Capillary refill takes less than 2 seconds.   Neurological:      General: No focal deficit present.      Mental Status: She is alert and oriented to person, place, and time.   Psychiatric:         Mood and Affect: Mood normal.         Behavior: Behavior normal.         CRANIAL NERVES     CN III, IV, VI   Pupils are equal, round, and reactive to light.     Significant Labs: All pertinent labs within the past 24 hours have been reviewed.  CBC:   Recent Labs   Lab 04/06/23  1441   WBC 20.87*   HGB 15.0   HCT 45.1        CMP:   Recent Labs   Lab 04/06/23  1442   *   K 3.9   CL 92*   CO2 27    *   BUN 24*   CREATININE 1.3   CALCIUM 10.5   PROT 8.9*   ALBUMIN 4.2   BILITOT 0.5   ALKPHOS 50*   AST 26   ALT 30   ANIONGAP 12     Magnesium:   Recent Labs   Lab 04/06/23 1935   MG 1.5*     Troponin:   Recent Labs   Lab 04/06/23 1935   TROPONINI 0.015       Significant Imaging: I have reviewed all pertinent imaging results/findings within the past 24 hours.  EKG: I have reviewed all pertinent results/findings within the past 24 hours and my personal findings are: normal sinus rhythm, previous septal infarct    X-Ray Chest AP Portable    Result Date: 4/6/2023  EXAMINATION: XR CHEST AP PORTABLE CLINICAL HISTORY: Elevated white blood cell count, unspecified TECHNIQUE: Single frontal view of the chest was performed. COMPARISON: Radiographs 02/13/2022. FINDINGS: Redemonstrated median sternotomy changes and cardiac device.  Cardiomediastinal silhouette is within normal limits.  Lungs are well expanded and clear.  No focal consolidation, pneumothorax, or pleural effusion.  No significant/focal bony abnormality.     No acute cardiopulmonary process. Electronically signed by: Luis Fajardo Date:    04/06/2023 Time:    17:55

## 2023-04-07 NOTE — ASSESSMENT & PLAN NOTE
Patient's FSGs are controlled on current medication regimen.  Last A1c reviewed-   Lab Results   Component Value Date    HGBA1C 8.5 (H) 04/06/2023     Most recent fingerstick glucose reviewed-   Recent Labs   Lab 04/06/23 2033 04/07/23 0828 04/07/23  1138   POCTGLUCOSE 145* 153* 161*     Current correctional scale  Low  Maintain anti-hyperglycemic dose as follows-   Antihyperglycemics (From admission, onward)    Start     Stop Route Frequency Ordered    04/06/23 1958  insulin aspart U-100 pen 0-5 Units         -- SubQ Before meals & nightly PRN 04/06/23 1958        Hold Oral hypoglycemics while patient is in the hospital.

## 2023-04-07 NOTE — ASSESSMENT & PLAN NOTE
Improving. No abdominal pain. Unclear etiology.  S/p IVF; holding further due to CHF history  - antiemetics prn  - full liquid diet  - GI consult: ordered abd US

## 2023-04-07 NOTE — HOSPITAL COURSE
Celine Steen is a 67 year old female with a past medical history of HTN, HLD, CAD s/p CABG, combined CHF, AICD, DM, obesity and anxiety/depression who is admitted due to intractable nausea and vomiting for multiple days without abdominal pain or change in bowel habits. She had an elevated WBC count and elevated lactic acid on admission which have improved during her course with PRN anti-emetics and low dose IV fluids. An abdominal ultrasound was unremarkable and GI was consulted. Her symptoms improved and she was able to tolerate an oral diet. She was discharged 4/8/2023 and will follow up with her PCP in the outpatient setting.

## 2023-04-07 NOTE — CONSULTS
"CONSULT  Gastroenterology      SUBJECTIVE:       Chief Complaint/Indication for Procedure:   Reason for Consult? intractable nausea vomiting     History of Present Illness:  This my first encounter with this pleasant 66 y/o diabetic, who presented with a 5 day hx of N/V.  She feels much better today, having received IV fluid overnight.  And, in fact, is now c/o "hungry."            PTA Medications   Medication Sig    amLODIPine (NORVASC) 5 MG tablet Take 1 tablet (5 mg total) by mouth once daily.    aspirin (ECOTRIN) 81 MG EC tablet Take 81 mg by mouth once daily.    azelastine (ASTELIN) 137 mcg (0.1 %) nasal spray 1 spray (137 mcg total) by Nasal route 2 (two) times daily. for 7 days    clonazePAM (KLONOPIN) 1 MG tablet Take 1 mg by mouth daily as needed.    clopidogreL (PLAVIX) 75 mg tablet Take 1 tablet (75 mg total) by mouth once daily.    EScitalopram oxalate (LEXAPRO) 20 MG tablet TAKE 1/2 TABLET BY MOUTH TWICE DAILY    furosemide (LASIX) 40 MG tablet TAKE 1 TABLET(40 MG) BY MOUTH TWICE DAILY    lisinopriL 10 MG tablet Take 1 tablet (10 mg total) by mouth once daily.    metFORMIN (GLUCOPHAGE) 500 MG tablet TAKE 1 TABLET(500 MG) BY MOUTH TWICE DAILY    metoprolol succinate (TOPROL-XL) 100 MG 24 hr tablet Take 1 tablet (100 mg total) by mouth once daily.    nitroGLYCERIN (NITROSTAT) 0.3 MG SL tablet Place 1 tablet (0.3 mg total) under the tongue every 5 (five) minutes as needed for Chest pain.    ondansetron (ZOFRAN-ODT) 4 MG TbDL Take 1 tablet (4 mg total) by mouth every 8 (eight) hours as needed (nauseau).    simvastatin (ZOCOR) 40 MG tablet Take 1 tablet (40 mg total) by mouth every evening.    cetirizine (ZYRTEC) 10 MG tablet Take 1 tablet (10 mg total) by mouth once daily. (Patient not taking: Reported on 2/17/2023)    coenzyme Q10 200 mg capsule Take 200 mg by mouth once daily.       Review of patient's allergies indicates:  No Known Allergies     Past Medical History:   Diagnosis Date    Anxiety     CAD " (coronary artery disease)     CHF (congestive heart failure)     Chronic low back pain     Diabetes 2012    Diabetes mellitus     Hyperkalemia 2022    Hypertension     Leukocytosis 2022    Metabolic acidosis with respiratory acidosis 2022     Past Surgical History:   Procedure Laterality Date    CARDIAC DEFIBRILLATOR PLACEMENT      CORONARY ARTERY BYPASS GRAFT  2007    HYSTERECTOMY       Family History   Problem Relation Age of Onset    Diabetes Mother     Mental illness Brother     Heart disease Brother         MI    Cerebral aneurysm Brother      Social History     Tobacco Use    Smoking status: Former     Types: Cigarettes     Quit date:      Years since quittin.2    Smokeless tobacco: Never   Substance Use Topics    Alcohol use: No         OBJECTIVE:     Vital Signs (Most Recent)  Temp: 98.3 °F (36.8 °C) (23 0757)  Pulse: 67 (23 0757)  Resp: 17 (23 1113)  BP: (!) 162/71 (23 0757)  SpO2: (!) 91 % (23 0757)    Physical Exam:  :                                                        GENERAL:  Comfortable, in no acute distress.                                 HEENT EXAM:  Nonicteric.  No adenopathy.  Oropharynx is clear.               NECK:  Supple.                                                               LUNGS:  Clear.                                                               CARDIAC:  Regular rate and rhythm.  S1, S2.  No murmur.                      ABDOMEN:  Soft, positive bowel sounds, nontender.  No hepatosplenomegaly or masses.  No rebound or guarding.                                             EXTREMITIES:  No edema.     MENTAL STATUS:  Alert and oriented.    ASSESSMENT/PLAN:     Assessment:     Plan:   U/S abdo in the AM, mainly to assess the gallbladder for gallstones (since diabetic, may not be having the usual symptoms).

## 2023-04-07 NOTE — NURSING
Awake alert and oriented x4. Follows simple commands. Good historian. 20g saline loc to lt forearm with site free of s/s of infiltration. Notified of room number. Questions answered and encouraged.

## 2023-04-07 NOTE — ASSESSMENT & PLAN NOTE
Likely dehydration related  Low suspicion for bowel ischemia given no pain  S/p IV hydration  Repeat improved  - hold metformin   - monitor for signs of infection

## 2023-04-07 NOTE — PROGRESS NOTES
[facility] Hospital Medicine  Progress Note    Patient Name: Celine Steen  MRN: [id number]  Patient Class: OP- Observation   Admission Date: 4/6/2023  Length of Stay: 0 days  Attending Physician: Tato Davis MD  Primary Care Provider: Josseline Vargas NP        Subjective:     Principal Problem:Intractable nausea and vomiting        HPI:  Ms. Steen is a 67 year old female with a history of CAD, HFpEF 53%, AICD, s/p CABG, DM, HTN, HLD who presents today with complaints of N/V. It is severe. It is associated with anorexia, fatigue, and weakness. She denies abd pain, chest pain, SOB, dizziness, fever, chills, diarrhea, hematemesis, melena, or LOC. Symptoms began 4 days ago. She reports nonbloody bilious vomit. She had a normal BM yesterday that was soft and brown. She does appreciate any preceding abd pain, but states her ribs hurt now from vomiting. She has no new medication changes. No known contacts with similar symptoms. She states this happened to her several years ago, but she states they could never figure out the underlying cause. She was given 2 doses with IV antiemetics in the ED with continued nausea. Creatinine 1.3 with prior 0.9, WBC 20K, lactate 2.6, lipase 32. Covid is negative, urine with protein and minimal hyaline casts without infection. Hospital medicine is consulted for admission for further work up and plan.       Overview/Hospital Course:  67 year old female with a history of HTN, HLD, CAD s/p CABG, combined CHF, AICD, DM, and anxiety/depression is admitted due to intractable nausea and vomiting for multiple days without abdominal pain or change in bowel habits. With hypertension on admit which improved but otherwise stable vitals. Noted elevated WBC and lactate without infectious source likely attributed to retching and mild dehydration. Given limited IVF due to CHF history. Started on anti-emetics with improvement noted. GI consulted. Slowly started diet as she  did not desire any procedural intervention. Abdominal US ordered.      Interval History: Patient seen and examined. NAEON. Nausea improving. No more vomiting. Wanted to try eating today so full liquids ordered.    Review of Systems   Constitutional:  Negative for chills and fever.   Respiratory:  Negative for shortness of breath.    Cardiovascular:  Negative for chest pain.   Gastrointestinal:  Positive for nausea. Negative for abdominal pain, constipation, diarrhea and vomiting.   Objective:     Vital Signs (Most Recent):  Temp: 97.5 °F (36.4 °C) (04/07/23 1537)  Pulse: 64 (04/07/23 1537)  Resp: 18 (04/07/23 1537)  BP: 128/64 (04/07/23 1537)  SpO2: (!) 92 % (04/07/23 1537)   Vital Signs (24h Range):  Temp:  [97.5 °F (36.4 °C)-98.4 °F (36.9 °C)] 97.5 °F (36.4 °C)  Pulse:  [64-76] 64  Resp:  [16-18] 18  SpO2:  [91 %-97 %] 92 %  BP: (128-190)/(64-96) 128/64     Weight: 81.6 kg (180 lb)  Body mass index is 31.89 kg/m².  No intake or output data in the 24 hours ending 04/07/23 1608   Physical Exam  Vitals reviewed.   Constitutional:       General: She is not in acute distress.  HENT:      Head: Normocephalic and atraumatic.   Cardiovascular:      Rate and Rhythm: Normal rate and regular rhythm.   Pulmonary:      Effort: Pulmonary effort is normal. No respiratory distress.      Breath sounds: Normal breath sounds.   Abdominal:      General: Abdomen is flat. Bowel sounds are normal. There is no distension.      Palpations: Abdomen is soft.      Tenderness: There is no abdominal tenderness. There is no guarding.   Neurological:      Mental Status: She is alert.   Psychiatric:         Mood and Affect: Affect normal.         Behavior: Behavior normal.         Thought Content: Thought content normal.       Significant Labs: All pertinent labs within the past 24 hours have been reviewed.    Significant Imaging: I have reviewed all pertinent imaging results/findings within the past 24 hours.      Assessment/Plan:      *  Intractable nausea and vomiting  Improving. No abdominal pain. Unclear etiology.  S/p IVF; holding further due to CHF history  - antiemetics prn  - full liquid diet  - GI consult: ordered abd US    Lactic acidosis  Likely dehydration related  Low suspicion for bowel ischemia given no pain  S/p IV hydration  Repeat improved  - hold metformin   - monitor for signs of infection    Chronic combined systolic and diastolic heart failure  Chronic, stable  - continue acei and beta blocker  - hold home lasix due to likely dehydration on admit; resume when appropriate  - monitor fluid status    Leukocytosis  May be reactive to several day of N/V/retching  - monitor on cbc    ICD (implantable cardioverter-defibrillator) in place  Noted hx    Bipolar 1 disorder  Chronic, stable  - continue home escitalopram and klonopin prn    Type 2 diabetes mellitus with circulatory disorder  Patient's FSGs are controlled on current medication regimen.  Last A1c reviewed-   Lab Results   Component Value Date    HGBA1C 8.5 (H) 04/06/2023     Most recent fingerstick glucose reviewed-   Recent Labs   Lab 04/06/23 2033 04/07/23  0828 04/07/23  1138   POCTGLUCOSE 145* 153* 161*     Current correctional scale  Low  Maintain anti-hyperglycemic dose as follows-   Antihyperglycemics (From admission, onward)    Start     Stop Route Frequency Ordered    04/06/23 1958  insulin aspart U-100 pen 0-5 Units         -- SubQ Before meals & nightly PRN 04/06/23 1958        Hold Oral hypoglycemics while patient is in the hospital.    Essential hypertension  Chronic, controlled  - continue home lisinopril, amlodipine, and metoprolol    CAD (coronary artery disease)  Chronic, stable   - continue home asa, plavix, statin      VTE Risk Mitigation (From admission, onward)         Ordered     IP VTE HIGH RISK PATIENT  Once         04/06/23 1958     Place sequential compression device  Until discontinued         04/06/23 1958                Discharge Planning   CRISTHIAN:   4/8-4/9    Code Status: Full Code   Is the patient medically ready for discharge?:     Reason for patient still in hospital (select all that apply): Patient trending condition, Imaging and Consult recommendations  Discharge Plan A: Home with family                  Tato Davis MD  Department of Hospital Medicine   [facility]

## 2023-04-08 VITALS
WEIGHT: 180 LBS | HEART RATE: 65 BPM | HEIGHT: 63 IN | RESPIRATION RATE: 18 BRPM | TEMPERATURE: 98 F | BODY MASS INDEX: 31.89 KG/M2 | SYSTOLIC BLOOD PRESSURE: 182 MMHG | OXYGEN SATURATION: 91 % | DIASTOLIC BLOOD PRESSURE: 73 MMHG

## 2023-04-08 PROBLEM — R11.2 INTRACTABLE NAUSEA AND VOMITING: Status: RESOLVED | Noted: 2023-04-06 | Resolved: 2023-04-08

## 2023-04-08 PROBLEM — E66.9 OBESITY: Status: ACTIVE | Noted: 2023-02-17

## 2023-04-08 PROBLEM — E87.20 LACTIC ACIDOSIS: Status: RESOLVED | Noted: 2023-04-07 | Resolved: 2023-04-08

## 2023-04-08 PROBLEM — D72.829 LEUKOCYTOSIS: Status: RESOLVED | Noted: 2022-02-13 | Resolved: 2023-04-08

## 2023-04-08 LAB
ALBUMIN SERPL BCP-MCNC: 3.2 G/DL (ref 3.5–5.2)
ALP SERPL-CCNC: 40 U/L (ref 55–135)
ALT SERPL W/O P-5'-P-CCNC: 31 U/L (ref 10–44)
ANION GAP SERPL CALC-SCNC: 8 MMOL/L (ref 8–16)
AST SERPL-CCNC: 34 U/L (ref 10–40)
BASOPHILS # BLD AUTO: 0.07 K/UL (ref 0–0.2)
BASOPHILS NFR BLD: 0.6 % (ref 0–1.9)
BILIRUB SERPL-MCNC: 0.4 MG/DL (ref 0.1–1)
BUN SERPL-MCNC: 16 MG/DL (ref 8–23)
CALCIUM SERPL-MCNC: 9.1 MG/DL (ref 8.7–10.5)
CHLORIDE SERPL-SCNC: 106 MMOL/L (ref 95–110)
CO2 SERPL-SCNC: 22 MMOL/L (ref 23–29)
CREAT SERPL-MCNC: 0.9 MG/DL (ref 0.5–1.4)
DIFFERENTIAL METHOD: ABNORMAL
EOSINOPHIL # BLD AUTO: 0.2 K/UL (ref 0–0.5)
EOSINOPHIL NFR BLD: 1.7 % (ref 0–8)
ERYTHROCYTE [DISTWIDTH] IN BLOOD BY AUTOMATED COUNT: 13.2 % (ref 11.5–14.5)
EST. GFR  (NO RACE VARIABLE): >60 ML/MIN/1.73 M^2
GLUCOSE SERPL-MCNC: 145 MG/DL (ref 70–110)
HCT VFR BLD AUTO: 39 % (ref 37–48.5)
HGB BLD-MCNC: 12.7 G/DL (ref 12–16)
IMM GRANULOCYTES # BLD AUTO: 0.06 K/UL (ref 0–0.04)
IMM GRANULOCYTES NFR BLD AUTO: 0.5 % (ref 0–0.5)
LYMPHOCYTES # BLD AUTO: 2.8 K/UL (ref 1–4.8)
LYMPHOCYTES NFR BLD: 24 % (ref 18–48)
MCH RBC QN AUTO: 27.5 PG (ref 27–31)
MCHC RBC AUTO-ENTMCNC: 32.6 G/DL (ref 32–36)
MCV RBC AUTO: 84 FL (ref 82–98)
MONOCYTES # BLD AUTO: 1.7 K/UL (ref 0.3–1)
MONOCYTES NFR BLD: 14.4 % (ref 4–15)
NEUTROPHILS # BLD AUTO: 6.8 K/UL (ref 1.8–7.7)
NEUTROPHILS NFR BLD: 58.8 % (ref 38–73)
NRBC BLD-RTO: 0 /100 WBC
PLATELET # BLD AUTO: 267 K/UL (ref 150–450)
PMV BLD AUTO: 10 FL (ref 9.2–12.9)
POCT GLUCOSE: 128 MG/DL (ref 70–110)
POCT GLUCOSE: 128 MG/DL (ref 70–110)
POTASSIUM SERPL-SCNC: 3.6 MMOL/L (ref 3.5–5.1)
PROT SERPL-MCNC: 6.6 G/DL (ref 6–8.4)
RBC # BLD AUTO: 4.62 M/UL (ref 4–5.4)
SODIUM SERPL-SCNC: 136 MMOL/L (ref 136–145)
WBC # BLD AUTO: 11.48 K/UL (ref 3.9–12.7)

## 2023-04-08 PROCEDURE — G0378 HOSPITAL OBSERVATION PER HR: HCPCS

## 2023-04-08 PROCEDURE — 25000003 PHARM REV CODE 250: Performed by: STUDENT IN AN ORGANIZED HEALTH CARE EDUCATION/TRAINING PROGRAM

## 2023-04-08 PROCEDURE — 80053 COMPREHEN METABOLIC PANEL: CPT | Performed by: STUDENT IN AN ORGANIZED HEALTH CARE EDUCATION/TRAINING PROGRAM

## 2023-04-08 PROCEDURE — 36415 COLL VENOUS BLD VENIPUNCTURE: CPT | Performed by: STUDENT IN AN ORGANIZED HEALTH CARE EDUCATION/TRAINING PROGRAM

## 2023-04-08 PROCEDURE — 85025 COMPLETE CBC W/AUTO DIFF WBC: CPT | Performed by: STUDENT IN AN ORGANIZED HEALTH CARE EDUCATION/TRAINING PROGRAM

## 2023-04-08 RX ORDER — FUROSEMIDE 40 MG/1
40 TABLET ORAL DAILY
Status: DISCONTINUED | OUTPATIENT
Start: 2023-04-08 | End: 2023-04-08 | Stop reason: HOSPADM

## 2023-04-08 RX ORDER — ENOXAPARIN SODIUM 100 MG/ML
40 INJECTION SUBCUTANEOUS EVERY 24 HOURS
Status: DISCONTINUED | OUTPATIENT
Start: 2023-04-08 | End: 2023-04-08 | Stop reason: HOSPADM

## 2023-04-08 RX ADMIN — CLOPIDOGREL BISULFATE 75 MG: 75 TABLET, FILM COATED ORAL at 08:04

## 2023-04-08 RX ADMIN — ASPIRIN 81 MG: 81 TABLET, COATED ORAL at 08:04

## 2023-04-08 RX ADMIN — LISINOPRIL 10 MG: 10 TABLET ORAL at 08:04

## 2023-04-08 RX ADMIN — ATORVASTATIN CALCIUM 20 MG: 20 TABLET, FILM COATED ORAL at 08:04

## 2023-04-08 RX ADMIN — AMLODIPINE BESYLATE 5 MG: 5 TABLET ORAL at 08:04

## 2023-04-08 RX ADMIN — ESCITALOPRAM OXALATE 10 MG: 10 TABLET ORAL at 08:04

## 2023-04-08 RX ADMIN — METOPROLOL SUCCINATE 100 MG: 50 TABLET, EXTENDED RELEASE ORAL at 08:04

## 2023-04-08 RX ADMIN — FUROSEMIDE 40 MG: 40 TABLET ORAL at 11:04

## 2023-04-08 NOTE — ASSESSMENT & PLAN NOTE
Body mass index is 31.89 kg/m². Morbid obesity complicates all aspects of disease management from diagnostic modalities to treatment.

## 2023-04-08 NOTE — PROGRESS NOTES
[facility] Hospital Medicine  Progress Note    Patient Name: Celine Steen  MRN: [id number]  Patient Class: OP- Observation   Admission Date: 4/6/2023  Length of Stay: 0 days  Attending Physician: Luis Cervantes MD  Primary Care Provider: Josseline Vargas NP        Subjective:     Principal Problem:Intractable nausea and vomiting        HPI:  Ms. Steen is a 67 year old female with a history of CAD, HFpEF 53%, AICD, s/p CABG, DM, HTN, HLD who presents today with complaints of N/V. It is severe. It is associated with anorexia, fatigue, and weakness. She denies abd pain, chest pain, SOB, dizziness, fever, chills, diarrhea, hematemesis, melena, or LOC. Symptoms began 4 days ago. She reports nonbloody bilious vomit. She had a normal BM yesterday that was soft and brown. She does appreciate any preceding abd pain, but states her ribs hurt now from vomiting. She has no new medication changes. No known contacts with similar symptoms. She states this happened to her several years ago, but she states they could never figure out the underlying cause. She was given 2 doses with IV antiemetics in the ED with continued nausea. Creatinine 1.3 with prior 0.9, WBC 20K, lactate 2.6, lipase 32. Covid is negative, urine with protein and minimal hyaline casts without infection. Hospital medicine is consulted for admission for further work up and plan.       Overview/Hospital Course:  Celine Steen is a 67 year old female with a past medical history of HTN, HLD, CAD s/p CABG, combined CHF, AICD, DM, obesity and anxiety/depression who is admitted due to intractable nausea and vomiting for multiple days without abdominal pain or change in bowel habits. She had an elevated WBC count and elevated lactic acid on admission which have improved during her course with PRN anti-emetics and low dose IV fluids. An abdominal ultrasound was ordered and GI was consulted. She is tolerating a clear liquid  "diet.      Interval History: see "Hospital Course"    Review of Systems   Constitutional:  Negative for chills and fever.   Respiratory:  Negative for shortness of breath.    Cardiovascular:  Negative for chest pain.   Gastrointestinal:  Positive for nausea. Negative for abdominal pain, constipation, diarrhea and vomiting.   Objective:     Vital Signs (Most Recent):  Temp: 97 °F (36.1 °C) (04/08/23 0727)  Pulse: 73 (04/08/23 0727)  Resp: 18 (04/08/23 0727)  BP: (!) 140/61 (04/08/23 0814)  SpO2: (!) 93 % (04/08/23 0727)   Vital Signs (24h Range):  Temp:  [96 °F (35.6 °C)-97.8 °F (36.6 °C)] 97 °F (36.1 °C)  Pulse:  [58-75] 73  Resp:  [14-19] 18  SpO2:  [92 %-95 %] 93 %  BP: (122-158)/(58-95) 140/61     Weight: 81.6 kg (180 lb)  Body mass index is 31.89 kg/m².    Intake/Output Summary (Last 24 hours) at 4/8/2023 1117  Last data filed at 4/8/2023 0635  Gross per 24 hour   Intake 880 ml   Output 1000 ml   Net -120 ml      Physical Exam  Vitals and nursing note reviewed.   Constitutional:       General: She is not in acute distress.     Appearance: She is obese.   HENT:      Head: Normocephalic and atraumatic.      Right Ear: External ear normal.      Left Ear: External ear normal.      Nose: Nose normal.      Mouth/Throat:      Mouth: Mucous membranes are moist.      Pharynx: Oropharynx is clear.   Eyes:      Extraocular Movements: Extraocular movements intact.      Conjunctiva/sclera: Conjunctivae normal.   Cardiovascular:      Rate and Rhythm: Normal rate and regular rhythm.      Pulses: Normal pulses.      Heart sounds: Normal heart sounds.   Pulmonary:      Effort: Pulmonary effort is normal. No respiratory distress.      Breath sounds: Normal breath sounds.   Abdominal:      General: Abdomen is flat. Bowel sounds are normal. There is no distension.      Palpations: Abdomen is soft.      Tenderness: There is no abdominal tenderness. There is no guarding.   Musculoskeletal:         General: Normal range of motion.     "  Cervical back: Normal range of motion and neck supple.   Skin:     General: Skin is warm and dry.   Neurological:      General: No focal deficit present.      Mental Status: She is alert and oriented to person, place, and time. Mental status is at baseline.   Psychiatric:         Mood and Affect: Mood and affect normal.         Behavior: Behavior normal.         Thought Content: Thought content normal.       Significant Labs: All pertinent labs within the past 24 hours have been reviewed.    Significant Imaging: I have reviewed all pertinent imaging results/findings within the past 24 hours.      Assessment/Plan:      * Intractable nausea and vomiting  Improving. No abdominal pain. Unclear etiology; possible viral gastroenteritis.  -Antiemetics PRN  -Diabetic diet  -Follow abdominal ultrasound    Chronic combined systolic and diastolic heart failure  Chronic.  -Continue ACE-I and BB  -Lasix  -Telemetry    Obesity  Body mass index is 31.89 kg/m². Morbid obesity complicates all aspects of disease management from diagnostic modalities to treatment.      ICD (implantable cardioverter-defibrillator) in place  Chronic.    Bipolar 1 disorder  Chronic.  -Continue home escitalopram and Klonopin PRN    Type 2 diabetes mellitus with circulatory disorder  Patient's FSGs are controlled on current medication regimen.  Last A1c reviewed-   Lab Results   Component Value Date    HGBA1C 8.5 (H) 04/06/2023     Most recent fingerstick glucose reviewed-   Recent Labs   Lab 04/07/23  1138 04/07/23  1629 04/07/23  1926 04/08/23  0819   POCTGLUCOSE 161* 143* 220* 128*     Current correctional scale  Low  Maintain anti-hyperglycemic dose as follows-   Antihyperglycemics (From admission, onward)    Start     Stop Route Frequency Ordered    04/06/23 1958  insulin aspart U-100 pen 0-5 Units         -- SubQ Before meals & nightly PRN 04/06/23 1958        Hold Oral hypoglycemics while patient is in the hospital.    Essential  hypertension  Chronic.  -Continue home lisinopril, amlodipine, and metoprolol  -Continue to monitor    CAD (coronary artery disease)  Chronic.   -Continue home ASA, Plavix, and statin      VTE Risk Mitigation (From admission, onward)         Ordered     enoxaparin injection 40 mg  Daily         04/08/23 1100     IP VTE HIGH RISK PATIENT  Once         04/06/23 1958     Place sequential compression device  Until discontinued         04/06/23 1958                Discharge Planning   CRISTHIAN:      Code Status: Full Code   Is the patient medically ready for discharge?:     Reason for patient still in hospital (select all that apply): Patient trending condition, Treatment, Imaging and Consult recommendations  Discharge Plan A: Home with family                  Luis Cervantes MD  Department of Hospital Medicine   [facility]

## 2023-04-08 NOTE — ASSESSMENT & PLAN NOTE
Improving. No abdominal pain. Unclear etiology; possible viral gastroenteritis.  -Antiemetics PRN  -Diabetic diet  -Follow abdominal ultrasound

## 2023-04-08 NOTE — ASSESSMENT & PLAN NOTE
Patient's FSGs are controlled on current medication regimen.  Last A1c reviewed-   Lab Results   Component Value Date    HGBA1C 8.5 (H) 04/06/2023     Most recent fingerstick glucose reviewed-   Recent Labs   Lab 04/07/23  1629 04/07/23  1926 04/08/23  0819 04/08/23  1152   POCTGLUCOSE 143* 220* 128* 128*     Current correctional scale  Low  Maintain anti-hyperglycemic dose as follows-   Antihyperglycemics (From admission, onward)    Start     Stop Route Frequency Ordered    04/06/23 1958  insulin aspart U-100 pen 0-5 Units         -- SubQ Before meals & nightly PRN 04/06/23 1958        Hold Oral hypoglycemics while patient is in the hospital.

## 2023-04-08 NOTE — PLAN OF CARE
Plan of care reviewed with pt. Pt verbalized understanding. Patient is alert and oriented x 4, able to make needs known. A-febrile throughout the shift. Meds given per MAR. BG monitored closely, covered per sliding scale insulin. Ambulated to [facility] with x1 assist. Repositions self independently. No complaints of pain or discomfort. Purposeful hourly/q2hr rounding done during shift to promote patient safety. NAD noted. Safety maintained with side rails up x3, bed wheels locked, bed in lowest position, call light in reach. Patient educated to call for assistance with ambulation if needed, verbalized understanding. Pt remains free of falls. No further needs expressed at this time. Will continue to monitor.     Problem: Adult Inpatient Plan of Care  Goal: Plan of Care Review  Outcome: Ongoing, Progressing  Goal: Patient-Specific Goal (Individualized)  Outcome: Ongoing, Progressing  Goal: Absence of Hospital-Acquired Illness or Injury  Outcome: Ongoing, Progressing  Goal: Optimal Comfort and Wellbeing  Outcome: Ongoing, Progressing  Goal: Readiness for Transition of Care  Outcome: Ongoing, Progressing     Problem: Diabetes Comorbidity  Goal: Blood Glucose Level Within Targeted Range  Outcome: Ongoing, Progressing     Problem: Skin Injury Risk Increased  Goal: Skin Health and Integrity  Outcome: Ongoing, Progressing

## 2023-04-08 NOTE — PROGRESS NOTES
Pt d/c education provided to pt and spouse, both verbalized understanding. PIV and Tele removed. Discharge instructions and belongings in hand. Pt transported via wheelchair to spouse's personal vehicle.

## 2023-04-08 NOTE — SUBJECTIVE & OBJECTIVE
"Interval History: see "Hospital Course"    Review of Systems   Constitutional:  Negative for chills and fever.   Respiratory:  Negative for shortness of breath.    Cardiovascular:  Negative for chest pain.   Gastrointestinal:  Positive for nausea. Negative for abdominal pain, constipation, diarrhea and vomiting.   Objective:     Vital Signs (Most Recent):  Temp: 97 °F (36.1 °C) (04/08/23 0727)  Pulse: 73 (04/08/23 0727)  Resp: 18 (04/08/23 0727)  BP: (!) 140/61 (04/08/23 0814)  SpO2: (!) 93 % (04/08/23 0727)   Vital Signs (24h Range):  Temp:  [96 °F (35.6 °C)-97.8 °F (36.6 °C)] 97 °F (36.1 °C)  Pulse:  [58-75] 73  Resp:  [14-19] 18  SpO2:  [92 %-95 %] 93 %  BP: (122-158)/(58-95) 140/61     Weight: 81.6 kg (180 lb)  Body mass index is 31.89 kg/m².    Intake/Output Summary (Last 24 hours) at 4/8/2023 1117  Last data filed at 4/8/2023 0635  Gross per 24 hour   Intake 880 ml   Output 1000 ml   Net -120 ml      Physical Exam  Vitals and nursing note reviewed.   Constitutional:       General: She is not in acute distress.     Appearance: She is obese.   HENT:      Head: Normocephalic and atraumatic.      Right Ear: External ear normal.      Left Ear: External ear normal.      Nose: Nose normal.      Mouth/Throat:      Mouth: Mucous membranes are moist.      Pharynx: Oropharynx is clear.   Eyes:      Extraocular Movements: Extraocular movements intact.      Conjunctiva/sclera: Conjunctivae normal.   Cardiovascular:      Rate and Rhythm: Normal rate and regular rhythm.      Pulses: Normal pulses.      Heart sounds: Normal heart sounds.   Pulmonary:      Effort: Pulmonary effort is normal. No respiratory distress.      Breath sounds: Normal breath sounds.   Abdominal:      General: Abdomen is flat. Bowel sounds are normal. There is no distension.      Palpations: Abdomen is soft.      Tenderness: There is no abdominal tenderness. There is no guarding.   Musculoskeletal:         General: Normal range of motion.      Cervical " back: Normal range of motion and neck supple.   Skin:     General: Skin is warm and dry.   Neurological:      General: No focal deficit present.      Mental Status: She is alert and oriented to person, place, and time. Mental status is at baseline.   Psychiatric:         Mood and Affect: Mood and affect normal.         Behavior: Behavior normal.         Thought Content: Thought content normal.       Significant Labs: All pertinent labs within the past 24 hours have been reviewed.    Significant Imaging: I have reviewed all pertinent imaging results/findings within the past 24 hours.

## 2023-04-08 NOTE — DISCHARGE SUMMARY
[facility] Medicine  Discharge Summary      Patient Name: Celine Steen  MRN: [id number]  STACY: [id number]  Patient Class: OP- Observation  Admission Date: 4/6/2023  Hospital Length of Stay: 0 days  Discharge Date and Time: No discharge date for patient encounter.  Attending Physician: Luis Cervantes MD   Discharging Provider: Luis Cervantes MD  Primary Care Provider: Josseline Vargas NP    Primary Care Team: Networked reference to record PCT     HPI:   Ms. Steen is a 67 year old female with a history of CAD, HFpEF 53%, AICD, s/p CABG, DM, HTN, HLD who presents today with complaints of N/V. It is severe. It is associated with anorexia, fatigue, and weakness. She denies abd pain, chest pain, SOB, dizziness, fever, chills, diarrhea, hematemesis, melena, or LOC. Symptoms began 4 days ago. She reports nonbloody bilious vomit. She had a normal BM yesterday that was soft and brown. She does appreciate any preceding abd pain, but states her ribs hurt now from vomiting. She has no new medication changes. No known contacts with similar symptoms. She states this happened to her several years ago, but she states they could never figure out the underlying cause. She was given 2 doses with IV antiemetics in the ED with continued nausea. Creatinine 1.3 with prior 0.9, WBC 20K, lactate 2.6, lipase 32. Covid is negative, urine with protein and minimal hyaline casts without infection. Hospital medicine is consulted for admission for further work up and plan.       * No surgery found *      Hospital Course:   Celine Steen is a 67 year old female with a past medical history of HTN, HLD, CAD s/p CABG, combined CHF, AICD, DM, obesity and anxiety/depression who is admitted due to intractable nausea and vomiting for multiple days without abdominal pain or change in bowel habits. She had an elevated WBC count and elevated lactic acid on admission which have improved during her course with PRN  anti-emetics and low dose IV fluids. An abdominal ultrasound was unremarkable and GI was consulted. Her symptoms improved and she was able to tolerate an oral diet. She was discharged 4/8/2023 and will follow up with her PCP in the outpatient setting.       Goals of Care Treatment Preferences:  Code Status: Full Code      Consults:   Consults (From admission, onward)        Status Ordering Provider     Inpatient consult to Gastroenterology  Once        Provider:  Nabeel Kirkpatrick Jr., MD    Acknowledged CORBIN MELCHOR consult to case management  Once        Provider:  (Not yet assigned)    REJI Morales          Cardiac/Vascular  Chronic combined systolic and diastolic heart failure  Chronic.  -Continue ACE-I and BB  -Lasix  -Telemetry    ICD (implantable cardioverter-defibrillator) in place  Chronic.    Essential hypertension  Chronic.  -Continue home lisinopril, amlodipine, and metoprolol  -Continue to monitor    CAD (coronary artery disease)  Chronic.   -Continue home ASA, Plavix, and statin    Endocrine  Obesity  Body mass index is 31.89 kg/m². Morbid obesity complicates all aspects of disease management from diagnostic modalities to treatment.      Type 2 diabetes mellitus with circulatory disorder  Patient's FSGs are controlled on current medication regimen.  Last A1c reviewed-   Lab Results   Component Value Date    HGBA1C 8.5 (H) 04/06/2023     Most recent fingerstick glucose reviewed-   Recent Labs   Lab 04/07/23  1629 04/07/23  1926 04/08/23  0819 04/08/23  1152   POCTGLUCOSE 143* 220* 128* 128*     Current correctional scale  Low  Maintain anti-hyperglycemic dose as follows-   Antihyperglycemics (From admission, onward)    Start     Stop Route Frequency Ordered    04/06/23 1958  insulin aspart U-100 pen 0-5 Units         -- SubQ Before meals & nightly PRN 04/06/23 1958        Hold Oral hypoglycemics while patient is in the hospital.    Other  Bipolar 1 disorder  Chronic.  -Continue  home escitalopram and Klonopin PRN      Final Active Diagnoses:    Diagnosis Date Noted POA    Chronic combined systolic and diastolic heart failure [I50.42] 04/06/2023 Yes     Chronic    Obesity [E66.9] 02/17/2023 Yes    ICD (implantable cardioverter-defibrillator) in place [Z95.810] 08/11/2016 Yes     Chronic    Bipolar 1 disorder [F31.9] 12/11/2012 Yes     Chronic    Type 2 diabetes mellitus with circulatory disorder [E11.59] 02/01/2012 Yes    CAD (coronary artery disease) [I25.10]  Yes     Chronic    Essential hypertension [I10]  Yes     Chronic      Problems Resolved During this Admission:    Diagnosis Date Noted Date Resolved POA    PRINCIPAL PROBLEM:  Intractable nausea and vomiting [R11.2] 04/06/2023 04/08/2023 Yes    Lactic acidosis [E87.20] 04/07/2023 04/08/2023 Yes    Leukocytosis [D72.829] 02/13/2022 04/08/2023 Yes       Discharged Condition: stable    Disposition: Home or Self Care    Follow Up:   Follow-up Information     Josseline Vargas NP Follow up in 2 week(s).    Specialty: Family Medicine  Contact information:  [address]  [facility] [id number] [phone]                       Patient Instructions:      Ambulatory referral/consult to Outpatient Case Management   Referral Priority: Routine Referral Type: Consultation   Referral Reason: Specialty Services Required   Number of Visits Requested: 1     Diet diabetic     Notify your health care provider if you experience any of the following:  temperature >100.4     Notify your health care provider if you experience any of the following:  increased confusion or weakness     Notify your health care provider if you experience any of the following:  persistent dizziness, light-headedness, or visual disturbances     Notify your health care provider if you experience any of the following:  severe persistent headache     Notify your health care provider if you experience any of the following:  difficulty  breathing or increased cough     Notify your health care provider if you experience any of the following:  persistent nausea and vomiting or diarrhea     Notify your health care provider if you experience any of the following:  severe uncontrolled pain     Activity as tolerated       Significant Diagnostic Studies: Labs: All labs within the past 24 hours have been reviewed    Pending Diagnostic Studies:     None         Medications:  Reconciled Home Medications:      Medication List      CONTINUE taking these medications    amLODIPine 5 MG tablet  Commonly known as: NORVASC  Take 1 tablet (5 mg total) by mouth once daily.     aspirin 81 MG EC tablet  Commonly known as: ECOTRIN  Take 81 mg by mouth once daily.     azelastine 137 mcg (0.1 %) nasal spray  Commonly known as: ASTELIN  1 spray (137 mcg total) by Nasal route 2 (two) times daily. for 7 days     clonazePAM 1 MG tablet  Commonly known as: KlonoPIN  Take 1 mg by mouth daily as needed.     clopidogreL 75 mg tablet  Commonly known as: PLAVIX  Take 1 tablet (75 mg total) by mouth once daily.     coenzyme Q10 200 mg capsule  Take 200 mg by mouth once daily.     EScitalopram oxalate 20 MG tablet  Commonly known as: LEXAPRO  TAKE 1/2 TABLET BY MOUTH TWICE DAILY     furosemide 40 MG tablet  Commonly known as: LASIX  TAKE 1 TABLET(40 MG) BY MOUTH TWICE DAILY     lisinopriL 10 MG tablet  Take 1 tablet (10 mg total) by mouth once daily.     metFORMIN 500 MG tablet  Commonly known as: GLUCOPHAGE  TAKE 1 TABLET(500 MG) BY MOUTH TWICE DAILY     metoprolol succinate 100 MG 24 hr tablet  Commonly known as: TOPROL-XL  Take 1 tablet (100 mg total) by mouth once daily.     nitroGLYCERIN 0.3 MG SL tablet  Commonly known as: NITROSTAT  Place 1 tablet (0.3 mg total) under the tongue every 5 (five) minutes as needed for Chest pain.     ondansetron 4 MG Tbdl  Commonly known as: ZOFRAN-ODT  Take 1 tablet (4 mg total) by mouth every 8 (eight) hours as needed (nauseau).     simvastatin  40 MG tablet  Commonly known as: ZOCOR  Take 1 tablet (40 mg total) by mouth every evening.        STOP taking these medications    cetirizine 10 MG tablet  Commonly known as: ZYRTEC            Indwelling Lines/Drains at time of discharge:   Lines/Drains/Airways     None                 Time spent on the discharge of patient: 32 minutes         Luis Cervantes MD  Department of Hospital Medicine  [facility]

## 2023-04-08 NOTE — PLAN OF CARE
Patient cleared for discharge from case management standpoint.       04/08/23 1405   Final Note   Assessment Type Final Discharge Note   Anticipated Discharge Disposition Home   What phone number can be called within the next 1-3 days to see how you are doing after discharge? [phone]   Hospital Resources/Appts/Education Provided Appointments scheduled and added to AVS;Provided patient/caregiver with written discharge plan information

## 2023-04-08 NOTE — PLAN OF CARE
POC reviewed with Patient VSS afebrile Pain managed with PRN Medications,Continent of bowels and bladder Glucose monitored Tolerating full liquid diet no acute distress noted at this time.

## 2023-04-08 NOTE — ASSESSMENT & PLAN NOTE
Patient's FSGs are controlled on current medication regimen.  Last A1c reviewed-   Lab Results   Component Value Date    HGBA1C 8.5 (H) 04/06/2023     Most recent fingerstick glucose reviewed-   Recent Labs   Lab 04/07/23  1138 04/07/23  1629 04/07/23  1926 04/08/23  0819   POCTGLUCOSE 161* 143* 220* 128*     Current correctional scale  Low  Maintain anti-hyperglycemic dose as follows-   Antihyperglycemics (From admission, onward)    Start     Stop Route Frequency Ordered    04/06/23 1958  insulin aspart U-100 pen 0-5 Units         -- SubQ Before meals & nightly PRN 04/06/23 1958        Hold Oral hypoglycemics while patient is in the hospital.

## 2023-04-12 LAB
BACTERIA BLD CULT: NORMAL
BACTERIA BLD CULT: NORMAL

## 2023-04-19 ENCOUNTER — OUTPATIENT CASE MANAGEMENT (OUTPATIENT)
Dept: ADMINISTRATIVE | Facility: OTHER | Age: 68
End: 2023-04-19
Payer: MEDICARE

## 2023-04-24 ENCOUNTER — OUTPATIENT CASE MANAGEMENT (OUTPATIENT)
Dept: ADMINISTRATIVE | Facility: OTHER | Age: 68
End: 2023-04-24
Payer: MEDICARE

## 2023-05-12 ENCOUNTER — LAB VISIT (OUTPATIENT)
Dept: LAB | Facility: HOSPITAL | Age: 68
End: 2023-05-12
Attending: NURSE PRACTITIONER
Payer: MEDICARE

## 2023-05-12 DIAGNOSIS — E11.9 TYPE 2 DIABETES MELLITUS WITHOUT COMPLICATION, WITHOUT LONG-TERM CURRENT USE OF INSULIN: ICD-10-CM

## 2023-05-12 DIAGNOSIS — Z00.00 ANNUAL PHYSICAL EXAM: ICD-10-CM

## 2023-05-12 DIAGNOSIS — E11.69 HYPERLIPIDEMIA ASSOCIATED WITH TYPE 2 DIABETES MELLITUS: ICD-10-CM

## 2023-05-12 DIAGNOSIS — E78.5 HYPERLIPIDEMIA ASSOCIATED WITH TYPE 2 DIABETES MELLITUS: ICD-10-CM

## 2023-05-12 LAB
CHOLEST SERPL-MCNC: 149 MG/DL (ref 120–199)
CHOLEST/HDLC SERPL: 3.9 {RATIO} (ref 2–5)
HDLC SERPL-MCNC: 38 MG/DL (ref 40–75)
HDLC SERPL: 25.5 % (ref 20–50)
LDLC SERPL CALC-MCNC: 60.8 MG/DL (ref 63–159)
NONHDLC SERPL-MCNC: 111 MG/DL
T4 FREE SERPL-MCNC: 0.99 NG/DL (ref 0.71–1.51)
TRIGL SERPL-MCNC: 251 MG/DL (ref 30–150)
TSH SERPL DL<=0.005 MIU/L-ACNC: 0.04 UIU/ML (ref 0.4–4)

## 2023-05-12 PROCEDURE — 84443 ASSAY THYROID STIM HORMONE: CPT | Performed by: NURSE PRACTITIONER

## 2023-05-12 PROCEDURE — 84439 ASSAY OF FREE THYROXINE: CPT | Performed by: NURSE PRACTITIONER

## 2023-05-12 PROCEDURE — 36415 COLL VENOUS BLD VENIPUNCTURE: CPT | Mod: PO | Performed by: NURSE PRACTITIONER

## 2023-05-12 PROCEDURE — 80061 LIPID PANEL: CPT | Performed by: NURSE PRACTITIONER

## 2023-05-17 ENCOUNTER — OFFICE VISIT (OUTPATIENT)
Dept: FAMILY MEDICINE | Facility: CLINIC | Age: 68
End: 2023-05-17
Payer: MEDICARE

## 2023-05-17 VITALS
WEIGHT: 175.06 LBS | TEMPERATURE: 98 F | HEART RATE: 74 BPM | HEIGHT: 63 IN | OXYGEN SATURATION: 97 % | BODY MASS INDEX: 31.02 KG/M2 | DIASTOLIC BLOOD PRESSURE: 70 MMHG | RESPIRATION RATE: 17 BRPM | SYSTOLIC BLOOD PRESSURE: 138 MMHG

## 2023-05-17 DIAGNOSIS — Z12.31 ENCOUNTER FOR SCREENING MAMMOGRAM FOR MALIGNANT NEOPLASM OF BREAST: ICD-10-CM

## 2023-05-17 DIAGNOSIS — F51.01 PRIMARY INSOMNIA: ICD-10-CM

## 2023-05-17 DIAGNOSIS — Z12.39 ENCOUNTER FOR SCREENING FOR MALIGNANT NEOPLASM OF BREAST, UNSPECIFIED SCREENING MODALITY: Primary | ICD-10-CM

## 2023-05-17 DIAGNOSIS — F10.21 HISTORY OF ALCOHOL DEPENDENCE: ICD-10-CM

## 2023-05-17 DIAGNOSIS — E11.59 TYPE 2 DIABETES MELLITUS WITH OTHER CIRCULATORY COMPLICATION, WITHOUT LONG-TERM CURRENT USE OF INSULIN: ICD-10-CM

## 2023-05-17 DIAGNOSIS — I25.10 CORONARY ARTERY DISEASE DUE TO LIPID RICH PLAQUE: Chronic | ICD-10-CM

## 2023-05-17 DIAGNOSIS — I25.83 CORONARY ARTERY DISEASE DUE TO LIPID RICH PLAQUE: Chronic | ICD-10-CM

## 2023-05-17 PROCEDURE — 99999 PR PBB SHADOW E&M-EST. PATIENT-LVL IV: ICD-10-PCS | Mod: PBBFAC,,, | Performed by: FAMILY MEDICINE

## 2023-05-17 PROCEDURE — 99999 PR PBB SHADOW E&M-EST. PATIENT-LVL IV: CPT | Mod: PBBFAC,,, | Performed by: FAMILY MEDICINE

## 2023-05-17 PROCEDURE — 99214 PR OFFICE/OUTPT VISIT, EST, LEVL IV, 30-39 MIN: ICD-10-PCS | Mod: S$PBB,,, | Performed by: FAMILY MEDICINE

## 2023-05-17 PROCEDURE — 99214 OFFICE O/P EST MOD 30 MIN: CPT | Mod: S$PBB,,, | Performed by: FAMILY MEDICINE

## 2023-05-17 PROCEDURE — 99214 OFFICE O/P EST MOD 30 MIN: CPT | Mod: PBBFAC,PO | Performed by: FAMILY MEDICINE

## 2023-05-17 RX ORDER — MIRTAZAPINE 7.5 MG/1
7.5 TABLET, FILM COATED ORAL NIGHTLY
Qty: 30 TABLET | Refills: 11 | Status: SHIPPED | OUTPATIENT
Start: 2023-05-17 | End: 2024-05-16

## 2023-05-17 RX ORDER — NITROGLYCERIN 0.3 MG/1
0.3 TABLET SUBLINGUAL EVERY 5 MIN PRN
Qty: 25 TABLET | Refills: 12 | Status: SHIPPED | OUTPATIENT
Start: 2023-05-17 | End: 2024-05-16

## 2023-05-17 NOTE — PROGRESS NOTES
Subjective:   Patient ID: Celine Steen is a 68 y.o. female     Chief Complaint:Establish Care      Here for checkup    Review of Systems   Respiratory:  Negative for shortness of breath.    Cardiovascular:  Negative for chest pain.   Gastrointestinal:  Negative for abdominal pain.   Genitourinary:  Negative for dysuria.   Past Medical History:   Diagnosis Date    Anxiety     CAD (coronary artery disease)     CHF (congestive heart failure)     Chronic low back pain     Diabetes 2/1/2012    Diabetes mellitus     Hyperkalemia 2/13/2022    Hypertension     Leukocytosis 2/13/2022    Metabolic acidosis with respiratory acidosis 2/13/2022     Past Surgical History:   Procedure Laterality Date    CARDIAC DEFIBRILLATOR PLACEMENT  2007    CORONARY ARTERY BYPASS GRAFT  2007    HYSTERECTOMY       Objective:     Vitals:    05/17/23 1458   BP: 138/70   Pulse: 74   Resp: 17   Temp: 97.9 °F (36.6 °C)     Body mass index is 31.01 kg/m².  Physical Exam  Vitals and nursing note reviewed.   Constitutional:       Appearance: She is well-developed.   HENT:      Head: Normocephalic and atraumatic.   Eyes:      General: No scleral icterus.     Conjunctiva/sclera: Conjunctivae normal.   Cardiovascular:      Pulses:           Dorsalis pedis pulses are 2+ on the right side and 2+ on the left side.        Posterior tibial pulses are 2+ on the right side and 2+ on the left side.      Heart sounds: No murmur heard.  Pulmonary:      Effort: Pulmonary effort is normal. No respiratory distress.   Musculoskeletal:         General: No deformity. Normal range of motion.      Cervical back: Normal range of motion and neck supple.      Right foot: Normal range of motion.      Left foot: Normal range of motion.   Feet:      Right foot:      Protective Sensation: 4 sites tested.  4 sites sensed.      Skin integrity: Skin integrity normal.      Left foot:      Protective Sensation: 4 sites tested.  4 sites sensed.      Skin integrity: Skin integrity  normal.   Skin:     Coloration: Skin is not pale.      Findings: No rash.   Neurological:      Mental Status: She is alert and oriented to person, place, and time.   Psychiatric:         Behavior: Behavior normal.         Thought Content: Thought content normal.         Judgment: Judgment normal.     Assessment:     1. Encounter for screening for malignant neoplasm of breast, unspecified screening modality    2. Encounter for screening mammogram for malignant neoplasm of breast    3. Type 2 diabetes mellitus with other circulatory complication, without long-term current use of insulin    4. Primary insomnia    5. Coronary artery disease due to lipid rich plaque    6. History of alcohol dependence      Plan:   Encounter for screening for malignant neoplasm of breast, unspecified screening modality  -     Mammo Digital Screening Bilat w/ Carlos; Future; Expected date: 05/17/2023    Encounter for screening mammogram for malignant neoplasm of breast  -     Mammo Digital Screening Bilat w/ Carlos; Future; Expected date: 05/17/2023    Type 2 diabetes mellitus with other circulatory complication, without long-term current use of insulin  -     Comprehensive Metabolic Panel; Future; Expected date: 05/17/2023  -     Hemoglobin A1C; Future; Expected date: 05/17/2023  -     Lipid Panel; Future; Expected date: 05/17/2023  -     WBC; Future; Expected date: 05/17/2023  -     Hemoglobin; Future; Expected date: 05/17/2023    Primary insomnia  -     mirtazapine (REMERON) 7.5 MG Tab; Take 1 tablet (7.5 mg total) by mouth every evening.  Dispense: 30 tablet; Refill: 11    Coronary artery disease due to lipid rich plaque  -     nitroGLYCERIN (NITROSTAT) 0.3 MG SL tablet; Place 1 tablet (0.3 mg total) under the tongue every 5 (five) minutes as needed for Chest pain.  Dispense: 25 tablet; Refill: 12    History of alcohol dependence  Quit alcohol in 1992, age 37          Total time spent of Greater than 30 minutes minutes on the day of the  visit.This includes face to face time and preparing to see the patient, obtaining and reviewing separately obtained history, documenting clinical information in the electronic or other health record, independently interpreting results and communicating results to the patient/family/caregiver, or care coordinator.    Established patient with me has been instructed that must see me at least 1 time yearly (every 365 days) for refills of medications. Seeing other providers in this clinic is fine but expectation is to see me yearly.    Natan Marie MD  05/17/2023    Portions of this note have been dictated with NAHID Slade

## 2023-05-17 NOTE — PATIENT INSTRUCTIONS
Dread Berger,     If you are due for any health screening(s) below please notify me so we can arrange them to be ordered and scheduled to maintain your health. Most healthy patients complete it. Don't lose out on improving your health.     Tests to Keep You Healthy    Mammogram: ORDERED BUT NOT SCHEDULED  Eye Exam: DUE  Colon Cancer Screening: DUE  Last HbA1c < 8 (04/06/2023): NO      Breast Cancer Screening    Breast cancer is the second most common cancer in women after skin cancer, and the second leading cause of death from cancer after lung cancer. Mammograms can detect breast cancer early, which significantly increases the chances of curing the cancer.      A screening mammogram is an x-ray image of the breasts used for early breast cancer detection. It can help reduce the number of deaths from breast cancer among women. To get a clear image, the breast is placed between two plastic plates to make it flat. How often a mammogram is needed depends on your age and your breast cancer risk.    Colon Cancer Screening    Of cancers affecting both men and women, colorectal cancer is the third leading cancer killer in the [place]. But it doesnt have to be. Screening can prevent colorectal cancer or find it at an early stage when treatment often leads to a cure.    A colonoscopy is the preferred test for detecting colon cancer. It is needed only once every 10 years if results are negative. While sedated, a flexible, lighted tube with a tiny camera is inserted into the rectum and advanced through the colon to look for cancers. An alternative screening test that is used at home and returned to the lab may also be used. It detects hidden blood in bowel movements which could indicate cancer in the colon. If results are positive, you will need a colonoscopy to determine if the blood is a sign of cancer. This type of follow up (diagnostic) colonoscopy usually requires additional copays as required by your insurance  provider. Please contact your PCP if you have any questions.         Diabetic Retinal Eye Exam    Diabetes is the #1 cause of blindness in the [place] - early detection before signs or symptoms develop can prevent debilitating blindness.    Once-a-year screening is recommended for all diabetic patients. This exam can prevent and treat diabetes complications in the eye before developing symptoms. This can be done with a special camera is used to take photographs of the back of your eye without having to dilate them, or you can see an eye doctor for a full dilated exam.      A1c every 3-6 months, lipid panel every year, microalbumin (urine test) once per year, comprehensive foot exam once per year, dilated eye exam at least once per year, dental exam every 6 months, flu vaccination every year, and pneumonia vaccination(s) as recommended

## 2023-07-17 ENCOUNTER — PES CALL (OUTPATIENT)
Dept: ADMINISTRATIVE | Facility: CLINIC | Age: 68
End: 2023-07-17
Payer: MEDICARE

## 2023-09-20 DIAGNOSIS — Z78.0 MENOPAUSE: ICD-10-CM

## 2023-09-26 ENCOUNTER — HOSPITAL ENCOUNTER (EMERGENCY)
Facility: HOSPITAL | Age: 68
Discharge: HOME | End: 2023-09-26
Attending: EMERGENCY MEDICINE
Payer: MEDICARE

## 2023-09-26 VITALS
RESPIRATION RATE: 17 BRPM | SYSTOLIC BLOOD PRESSURE: 121 MMHG | OXYGEN SATURATION: 96 % | TEMPERATURE: 98 F | DIASTOLIC BLOOD PRESSURE: 64 MMHG | HEART RATE: 78 BPM | HEIGHT: 63 IN | WEIGHT: 170 LBS | BODY MASS INDEX: 30.12 KG/M2

## 2023-09-26 DIAGNOSIS — M79.673 FOOT PAIN: ICD-10-CM

## 2023-09-26 DIAGNOSIS — M72.2 PLANTAR FASCIITIS: Primary | ICD-10-CM

## 2023-09-26 PROCEDURE — 99283 EMERGENCY DEPT VISIT LOW MDM: CPT

## 2023-09-26 NOTE — FIRST PROVIDER EVALUATION
Emergency Department TeleTriage Encounter Note      CHIEF COMPLAINT    Chief Complaint   Patient presents with    Foot Pain     Patient states she has right foot pain unknown injury pain since Saturday        VITAL SIGNS   Initial Vitals [09/26/23 1813]   BP Pulse Resp Temp SpO2   (!) 115/55 82 19 98 °F (36.7 °C) 95 %      MAP       --            ALLERGIES    Review of patient's allergies indicates:  No Known Allergies    PROVIDER TRIAGE NOTE  Patient presents with complaint of atraumatic right foot pain. Denied swelling or redness.      Phy:   Constitutional: well nourished, well developed, appearing stated age, NAD   HEENT: NCAT, symmetrical lids, No obvious facial deformity.  Normal phonation. Normal Conjunctiva   Neck: NAROM   Respiratory: Normal effort.  No obvious use of accessory muscles   Musculoskeletal: Moved upper extremities well   Neuro: Alert, answers questions appropriately    Psych: appropriate mood and affect      Initial orders will be placed and care will be transferred to an alternate provider when patient is roomed for a full evaluation. Any additional orders and the final disposition will be determined by that provider.        ORDERS  Labs Reviewed - No data to display    ED Orders (720h ago, onward)      None              Virtual Visit Note: The provider triage portion of this emergency department evaluation and documentation was performed via [employer], a HIPAA-compliant telemedicine application, in concert with a tele-presenter in the room. A face to face patient evaluation with one of my colleagues will occur once the patient is placed in an emergency department room.      DISCLAIMER: This note was prepared with [employer] voice recognition transcription software. Garbled syntax, mangled pronouns, and other bizarre constructions may be attributed to that software system.

## 2023-09-27 NOTE — ED PROVIDER NOTES
Encounter Date: 2023       History     Chief Complaint   Patient presents with    Foot Pain     Patient states she has right foot pain unknown injury pain since Saturday      68-year-old female with past medical history CHF, diabetes, CAD presenting with right foot pain.  Patient states she came to the emergency department because a relative had recently  of a blood clot, and she wanted to make sure she did not have a blood clot.  States pain has been present since Saturday, no numbness or tingling, no trauma, pain localized to right heel and along plantar aspect of foot.      Review of patient's allergies indicates:  No Known Allergies  Past Medical History:   Diagnosis Date    Anxiety     CAD (coronary artery disease)     CHF (congestive heart failure)     Chronic low back pain     Diabetes 2012    Diabetes mellitus     Hyperkalemia 2022    Hypertension     Leukocytosis 2022    Metabolic acidosis with respiratory acidosis 2022     Past Surgical History:   Procedure Laterality Date    CARDIAC DEFIBRILLATOR PLACEMENT      CORONARY ARTERY BYPASS GRAFT      HYSTERECTOMY       Family History   Problem Relation Age of Onset    Diabetes Mother     Mental illness Brother     Heart disease Brother         MI    Cerebral aneurysm Brother      Social History     Tobacco Use    Smoking status: Former     Current packs/day: 0.00     Types: Cigarettes     Quit date:      Years since quittin.7    Smokeless tobacco: Never   Substance Use Topics    Alcohol use: No     Review of Systems   All other systems reviewed and are negative.      Physical Exam     Initial Vitals [23 1813]   BP Pulse Resp Temp SpO2   (!) 115/55 82 19 98 °F (36.7 °C) 95 %      MAP       --         Physical Exam    Constitutional: No distress.   HENT:   Head: Normocephalic.   Nose: Nose normal.   Eyes: Right eye exhibits no discharge. Left eye exhibits no discharge.   Cardiovascular:  Normal rate.            Pulmonary/Chest: No stridor. No respiratory distress.   Abdominal: She exhibits no distension.   Musculoskeletal:      Comments: Tender to palpation along plantar aponeurosis, neurovascularly intact, no swelling, redness, tenderness to dorsal aspect of foot     Neurological: She is alert.   Skin: Skin is warm and dry.   Psychiatric: She has a normal mood and affect.         ED Course   Procedures  Labs Reviewed - No data to display       Imaging Results              X-Ray Foot Complete Right (Final result)  Result time 09/26/23 18:46:44      Final result by Ady Ibarra Jr., MD (09/26/23 18:46:44)                   Impression:      A small heel spur.  Mild hallux valgus      Electronically signed by: Ady Ibarra MD  Date:    09/26/2023  Time:    18:46               Narrative:    EXAMINATION:  XR FOOT COMPLETE 3 VIEW RIGHT    CLINICAL HISTORY:  . Pain in unspecified foot    TECHNIQUE:  AP, lateral, and oblique views of the right foot were performed.    COMPARISON:  None    FINDINGS:  An acute fracture of the bones of the right foot is not seen.  There is a small heel spur.  Mild hallux valgus is noted.                                       Medications - No data to display  Medical Decision Making  A/P:  Likely plantar fasciitis, do not suspect infection or fracture.  No suspicion for DVT.  Imaging not indicated at this time,, no indication for admission or consultation.  Will advise p.o. analgesia, shoe insoles, rice therapy, strict immediate return precautions for worsening symptoms, outpatient follow-up.                               Clinical Impression:   Final diagnoses:  [M79.673] Foot pain  [M72.2] Plantar fasciitis (Primary)        ED Disposition Condition    Discharge Stable          ED Prescriptions    None       Follow-up Information       Follow up With Specialties Details Why Contact Info    Natan Marie MD Family Medicine Schedule an appointment as soon as possible for a visit    [address] [facility]  [place] [address]  [phone]               Osmani Mai MD  09/27/23 0137

## 2023-09-27 NOTE — ED NOTES
Pt in possession of all belongings.  IV removed.  VSS; no signs of distress.  Pt to be escorted home by family in personal auto.  Discharge instructions and medications given to pt and explained by RN; pt verbalized understanding.  Pt agreed with care and discharge.

## 2023-10-11 NOTE — Clinical Note
See care mgmt tab Graft Donor Site Bandage (Optional-Leave Blank If You Don't Want In Note): Steri-strips and a pressure bandage were applied to the donor site.

## 2023-10-27 DIAGNOSIS — I50.33 ACUTE ON CHRONIC DIASTOLIC CONGESTIVE HEART FAILURE: ICD-10-CM

## 2023-10-27 NOTE — TELEPHONE ENCOUNTER
No care due was identified.  [facility] Embedded Care Due Messages. Reference number: [id number].   10/27/2023 3:56:44 PM CDT

## 2023-10-30 RX ORDER — FUROSEMIDE 40 MG/1
40 TABLET ORAL 2 TIMES DAILY
Qty: 180 TABLET | Refills: 2 | Status: SHIPPED | OUTPATIENT
Start: 2023-10-30

## 2023-11-10 DIAGNOSIS — E11.9 TYPE 2 DIABETES MELLITUS WITHOUT COMPLICATION, UNSPECIFIED WHETHER LONG TERM INSULIN USE: ICD-10-CM

## 2023-12-01 ENCOUNTER — LAB VISIT (OUTPATIENT)
Dept: LAB | Facility: HOSPITAL | Age: 68
End: 2023-12-01
Attending: FAMILY MEDICINE
Payer: MEDICARE

## 2023-12-01 DIAGNOSIS — E11.59 TYPE 2 DIABETES MELLITUS WITH OTHER CIRCULATORY COMPLICATION, WITHOUT LONG-TERM CURRENT USE OF INSULIN: ICD-10-CM

## 2023-12-01 LAB
ALBUMIN SERPL BCP-MCNC: 3.7 G/DL (ref 3.5–5.2)
ALP SERPL-CCNC: 59 U/L (ref 55–135)
ALT SERPL W/O P-5'-P-CCNC: 18 U/L (ref 10–44)
ANION GAP SERPL CALC-SCNC: 12 MMOL/L (ref 8–16)
AST SERPL-CCNC: 19 U/L (ref 10–40)
BILIRUB SERPL-MCNC: 0.4 MG/DL (ref 0.1–1)
BUN SERPL-MCNC: 46 MG/DL (ref 8–23)
CALCIUM SERPL-MCNC: 9.6 MG/DL (ref 8.7–10.5)
CHLORIDE SERPL-SCNC: 94 MMOL/L (ref 95–110)
CHOLEST SERPL-MCNC: 148 MG/DL (ref 120–199)
CHOLEST/HDLC SERPL: 4 {RATIO} (ref 2–5)
CO2 SERPL-SCNC: 23 MMOL/L (ref 23–29)
CREAT SERPL-MCNC: 1.7 MG/DL (ref 0.5–1.4)
EST. GFR  (NO RACE VARIABLE): 32.1 ML/MIN/1.73 M^2
GLUCOSE SERPL-MCNC: 479 MG/DL (ref 70–110)
HDLC SERPL-MCNC: 37 MG/DL (ref 40–75)
HDLC SERPL: 25 % (ref 20–50)
HGB BLD-MCNC: 13.3 G/DL (ref 12–16)
LDLC SERPL CALC-MCNC: 85 MG/DL (ref 63–159)
NONHDLC SERPL-MCNC: 111 MG/DL
POTASSIUM SERPL-SCNC: 5.7 MMOL/L (ref 3.5–5.1)
PROT SERPL-MCNC: 7.8 G/DL (ref 6–8.4)
SODIUM SERPL-SCNC: 129 MMOL/L (ref 136–145)
TRIGL SERPL-MCNC: 130 MG/DL (ref 30–150)
WBC # BLD AUTO: 10.56 K/UL (ref 3.9–12.7)

## 2023-12-01 PROCEDURE — 85048 AUTOMATED LEUKOCYTE COUNT: CPT | Mod: HCNC | Performed by: FAMILY MEDICINE

## 2023-12-01 PROCEDURE — 80053 COMPREHEN METABOLIC PANEL: CPT | Mod: HCNC | Performed by: FAMILY MEDICINE

## 2023-12-01 PROCEDURE — 80061 LIPID PANEL: CPT | Mod: HCNC | Performed by: FAMILY MEDICINE

## 2023-12-01 PROCEDURE — 83036 HEMOGLOBIN GLYCOSYLATED A1C: CPT | Mod: HCNC | Performed by: FAMILY MEDICINE

## 2023-12-01 PROCEDURE — 85018 HEMOGLOBIN: CPT | Mod: HCNC | Performed by: FAMILY MEDICINE

## 2023-12-01 PROCEDURE — 36415 COLL VENOUS BLD VENIPUNCTURE: CPT | Mod: HCNC,PO | Performed by: FAMILY MEDICINE

## 2023-12-02 LAB
ESTIMATED AVG GLUCOSE: ABNORMAL MG/DL (ref 68–131)
HBA1C MFR BLD: >14 % (ref 4–5.6)

## 2023-12-04 ENCOUNTER — TELEPHONE (OUTPATIENT)
Dept: FAMILY MEDICINE | Facility: CLINIC | Age: 68
End: 2023-12-04
Payer: MEDICARE

## 2023-12-04 ENCOUNTER — OFFICE VISIT (OUTPATIENT)
Dept: FAMILY MEDICINE | Facility: CLINIC | Age: 68
End: 2023-12-04
Payer: MEDICARE

## 2023-12-04 ENCOUNTER — HOSPITAL ENCOUNTER (OUTPATIENT)
Facility: HOSPITAL | Age: 68
Discharge: HOME | End: 2023-12-05
Attending: EMERGENCY MEDICINE | Admitting: INTERNAL MEDICINE
Payer: MEDICARE

## 2023-12-04 VITALS
WEIGHT: 176.56 LBS | TEMPERATURE: 98 F | SYSTOLIC BLOOD PRESSURE: 96 MMHG | HEART RATE: 81 BPM | DIASTOLIC BLOOD PRESSURE: 60 MMHG | OXYGEN SATURATION: 96 % | HEIGHT: 63 IN | RESPIRATION RATE: 17 BRPM | BODY MASS INDEX: 31.29 KG/M2

## 2023-12-04 DIAGNOSIS — R73.9 HYPERGLYCEMIA: Primary | ICD-10-CM

## 2023-12-04 DIAGNOSIS — N28.9 ACUTE KIDNEY INSUFFICIENCY: Primary | ICD-10-CM

## 2023-12-04 DIAGNOSIS — E11.22 TYPE 2 DIABETES MELLITUS WITH STAGE 3A CHRONIC KIDNEY DISEASE, WITHOUT LONG-TERM CURRENT USE OF INSULIN: ICD-10-CM

## 2023-12-04 DIAGNOSIS — R07.9 CHEST PAIN: ICD-10-CM

## 2023-12-04 DIAGNOSIS — N18.31 TYPE 2 DIABETES MELLITUS WITH STAGE 3A CHRONIC KIDNEY DISEASE, WITHOUT LONG-TERM CURRENT USE OF INSULIN: ICD-10-CM

## 2023-12-04 DIAGNOSIS — R73.9 HYPERGLYCEMIA: ICD-10-CM

## 2023-12-04 PROBLEM — N17.9 AKI (ACUTE KIDNEY INJURY): Status: ACTIVE | Noted: 2023-12-04

## 2023-12-04 LAB
ALBUMIN SERPL BCP-MCNC: 3.8 G/DL (ref 3.5–5.2)
ALLENS TEST: ABNORMAL
ALP SERPL-CCNC: 55 U/L (ref 55–135)
ALT SERPL W/O P-5'-P-CCNC: 23 U/L (ref 10–44)
ANION GAP SERPL CALC-SCNC: 16 MMOL/L (ref 8–16)
ANION GAP SERPL CALC-SCNC: 9 MMOL/L (ref 8–16)
AST SERPL-CCNC: 17 U/L (ref 10–40)
B-OH-BUTYR BLD STRIP-SCNC: 0.1 MMOL/L (ref 0–0.5)
BACTERIA #/AREA URNS HPF: NORMAL /HPF
BASOPHILS # BLD AUTO: 0.09 K/UL (ref 0–0.2)
BASOPHILS NFR BLD: 0.7 % (ref 0–1.9)
BILIRUB SERPL-MCNC: 0.3 MG/DL (ref 0.1–1)
BILIRUB UR QL STRIP: NEGATIVE
BUN SERPL-MCNC: 38 MG/DL (ref 8–23)
BUN SERPL-MCNC: 44 MG/DL (ref 8–23)
CALCIUM SERPL-MCNC: 10.3 MG/DL (ref 8.7–10.5)
CALCIUM SERPL-MCNC: 9.7 MG/DL (ref 8.7–10.5)
CHLORIDE SERPL-SCNC: 89 MMOL/L (ref 95–110)
CHLORIDE SERPL-SCNC: 96 MMOL/L (ref 95–110)
CLARITY UR: CLEAR
CO2 SERPL-SCNC: 20 MMOL/L (ref 23–29)
CO2 SERPL-SCNC: 25 MMOL/L (ref 23–29)
COLOR UR: COLORLESS
CREAT SERPL-MCNC: 1.3 MG/DL (ref 0.5–1.4)
CREAT SERPL-MCNC: 1.6 MG/DL (ref 0.5–1.4)
DELSYS: ABNORMAL
DIFFERENTIAL METHOD: ABNORMAL
EOSINOPHIL # BLD AUTO: 0.1 K/UL (ref 0–0.5)
EOSINOPHIL NFR BLD: 0.8 % (ref 0–8)
ERYTHROCYTE [DISTWIDTH] IN BLOOD BY AUTOMATED COUNT: 12.8 % (ref 11.5–14.5)
EST. GFR  (NO RACE VARIABLE): 34 ML/MIN/1.73 M^2
EST. GFR  (NO RACE VARIABLE): 44 ML/MIN/1.73 M^2
GLUCOSE SERPL-MCNC: 269 MG/DL (ref 70–110)
GLUCOSE SERPL-MCNC: 492 MG/DL (ref 70–110)
GLUCOSE UR QL STRIP: ABNORMAL
HCO3 UR-SCNC: 29.1 MMOL/L (ref 24–28)
HCT VFR BLD AUTO: 41.1 % (ref 37–48.5)
HGB BLD-MCNC: 13.5 G/DL (ref 12–16)
HGB UR QL STRIP: NEGATIVE
IMM GRANULOCYTES # BLD AUTO: 0.07 K/UL (ref 0–0.04)
IMM GRANULOCYTES NFR BLD AUTO: 0.5 % (ref 0–0.5)
KETONES UR QL STRIP: NEGATIVE
LEUKOCYTE ESTERASE UR QL STRIP: NEGATIVE
LYMPHOCYTES # BLD AUTO: 3.2 K/UL (ref 1–4.8)
LYMPHOCYTES NFR BLD: 23.5 % (ref 18–48)
MCH RBC QN AUTO: 27.5 PG (ref 27–31)
MCHC RBC AUTO-ENTMCNC: 32.8 G/DL (ref 32–36)
MCV RBC AUTO: 84 FL (ref 82–98)
MICROSCOPIC COMMENT: NORMAL
MODE: ABNORMAL
MONOCYTES # BLD AUTO: 1.6 K/UL (ref 0.3–1)
MONOCYTES NFR BLD: 11.7 % (ref 4–15)
NEUTROPHILS # BLD AUTO: 8.6 K/UL (ref 1.8–7.7)
NEUTROPHILS NFR BLD: 62.8 % (ref 38–73)
NITRITE UR QL STRIP: NEGATIVE
NRBC BLD-RTO: 0 /100 WBC
PCO2 BLDA: 44.5 MMHG (ref 35–45)
PH SMN: 7.42 [PH] (ref 7.35–7.45)
PH UR STRIP: 6 [PH] (ref 5–8)
PLATELET # BLD AUTO: 332 K/UL (ref 150–450)
PMV BLD AUTO: 10.5 FL (ref 9.2–12.9)
PO2 BLDA: 48 MMHG (ref 40–60)
POC BE: 5 MMOL/L
POC SATURATED O2: 84 % (ref 95–100)
POC TCO2: 30 MMOL/L (ref 24–29)
POCT GLUCOSE: 292 MG/DL (ref 70–110)
POCT GLUCOSE: 292 MG/DL (ref 70–110)
POCT GLUCOSE: 370 MG/DL (ref 70–110)
POCT GLUCOSE: >500 MG/DL (ref 70–110)
POTASSIUM SERPL-SCNC: 4.4 MMOL/L (ref 3.5–5.1)
POTASSIUM SERPL-SCNC: 5.4 MMOL/L (ref 3.5–5.1)
PROT SERPL-MCNC: 8 G/DL (ref 6–8.4)
PROT UR QL STRIP: NEGATIVE
RBC # BLD AUTO: 4.91 M/UL (ref 4–5.4)
RBC #/AREA URNS HPF: 0 /HPF (ref 0–4)
SAMPLE: ABNORMAL
SITE: ABNORMAL
SODIUM SERPL-SCNC: 125 MMOL/L (ref 136–145)
SODIUM SERPL-SCNC: 130 MMOL/L (ref 136–145)
SP GR UR STRIP: 1.01 (ref 1–1.03)
SQUAMOUS #/AREA URNS HPF: 0 /HPF
URN SPEC COLLECT METH UR: ABNORMAL
UROBILINOGEN UR STRIP-ACNC: NEGATIVE EU/DL
WBC # BLD AUTO: 13.76 K/UL (ref 3.9–12.7)
WBC #/AREA URNS HPF: 1 /HPF (ref 0–5)
YEAST URNS QL MICRO: NORMAL

## 2023-12-04 PROCEDURE — 81000 URINALYSIS NONAUTO W/SCOPE: CPT | Performed by: PHYSICIAN ASSISTANT

## 2023-12-04 PROCEDURE — 3061F PR NEG MICROALBUMINURIA RESULT DOCUMENTED/REVIEW: ICD-10-PCS | Mod: HCNC,CPTII,S$GLB, | Performed by: FAMILY MEDICINE

## 2023-12-04 PROCEDURE — 3074F PR MOST RECENT SYSTOLIC BLOOD PRESSURE < 130 MM HG: ICD-10-PCS | Mod: HCNC,CPTII,S$GLB, | Performed by: FAMILY MEDICINE

## 2023-12-04 PROCEDURE — 80053 COMPREHEN METABOLIC PANEL: CPT | Performed by: PHYSICIAN ASSISTANT

## 2023-12-04 PROCEDURE — 3288F PR FALLS RISK ASSESSMENT DOCUMENTED: ICD-10-PCS | Mod: HCNC,CPTII,S$GLB, | Performed by: FAMILY MEDICINE

## 2023-12-04 PROCEDURE — 3074F SYST BP LT 130 MM HG: CPT | Mod: HCNC,CPTII,S$GLB, | Performed by: FAMILY MEDICINE

## 2023-12-04 PROCEDURE — 3078F DIAST BP <80 MM HG: CPT | Mod: HCNC,CPTII,S$GLB, | Performed by: FAMILY MEDICINE

## 2023-12-04 PROCEDURE — 25000003 PHARM REV CODE 250: Performed by: PHYSICIAN ASSISTANT

## 2023-12-04 PROCEDURE — 99999 PR PBB SHADOW E&M-EST. PATIENT-LVL IV: CPT | Mod: PBBFAC,HCNC,, | Performed by: FAMILY MEDICINE

## 2023-12-04 PROCEDURE — 99214 OFFICE O/P EST MOD 30 MIN: CPT | Mod: HCNC,S$GLB,, | Performed by: FAMILY MEDICINE

## 2023-12-04 PROCEDURE — 93005 ELECTROCARDIOGRAM TRACING: CPT

## 2023-12-04 PROCEDURE — 82010 KETONE BODYS QUAN: CPT | Performed by: PHYSICIAN ASSISTANT

## 2023-12-04 PROCEDURE — 3288F FALL RISK ASSESSMENT DOCD: CPT | Mod: HCNC,CPTII,S$GLB, | Performed by: FAMILY MEDICINE

## 2023-12-04 PROCEDURE — 96360 HYDRATION IV INFUSION INIT: CPT

## 2023-12-04 PROCEDURE — 83036 HEMOGLOBIN GLYCOSYLATED A1C: CPT | Performed by: EMERGENCY MEDICINE

## 2023-12-04 PROCEDURE — 3008F PR BODY MASS INDEX (BMI) DOCUMENTED: ICD-10-PCS | Mod: HCNC,CPTII,S$GLB, | Performed by: FAMILY MEDICINE

## 2023-12-04 PROCEDURE — 1126F PR PAIN SEVERITY QUANTIFIED, NO PAIN PRESENT: ICD-10-PCS | Mod: HCNC,CPTII,S$GLB, | Performed by: FAMILY MEDICINE

## 2023-12-04 PROCEDURE — 1126F AMNT PAIN NOTED NONE PRSNT: CPT | Mod: HCNC,CPTII,S$GLB, | Performed by: FAMILY MEDICINE

## 2023-12-04 PROCEDURE — 4010F PR ACE/ARB THEARPY RXD/TAKEN: ICD-10-PCS | Mod: HCNC,CPTII,S$GLB, | Performed by: FAMILY MEDICINE

## 2023-12-04 PROCEDURE — 25000003 PHARM REV CODE 250: Performed by: NURSE PRACTITIONER

## 2023-12-04 PROCEDURE — 99999 PR PBB SHADOW E&M-EST. PATIENT-LVL IV: ICD-10-PCS | Mod: PBBFAC,HCNC,, | Performed by: FAMILY MEDICINE

## 2023-12-04 PROCEDURE — 1101F PR PT FALLS ASSESS DOC 0-1 FALLS W/OUT INJ PAST YR: ICD-10-PCS | Mod: HCNC,CPTII,S$GLB, | Performed by: FAMILY MEDICINE

## 2023-12-04 PROCEDURE — 96372 THER/PROPH/DIAG INJ SC/IM: CPT | Mod: 59 | Performed by: NURSE PRACTITIONER

## 2023-12-04 PROCEDURE — G0378 HOSPITAL OBSERVATION PER HR: HCPCS

## 2023-12-04 PROCEDURE — 3046F PR MOST RECENT HEMOGLOBIN A1C LEVEL > 9.0%: ICD-10-PCS | Mod: HCNC,CPTII,S$GLB, | Performed by: FAMILY MEDICINE

## 2023-12-04 PROCEDURE — 93010 EKG 12-LEAD: ICD-10-PCS | Mod: ,,, | Performed by: INTERNAL MEDICINE

## 2023-12-04 PROCEDURE — 80048 BASIC METABOLIC PNL TOTAL CA: CPT | Mod: XB | Performed by: NURSE PRACTITIONER

## 2023-12-04 PROCEDURE — 99285 EMERGENCY DEPT VISIT HI MDM: CPT | Mod: 25

## 2023-12-04 PROCEDURE — 94760 N-INVAS EAR/PLS OXIMETRY 1: CPT | Mod: XB

## 2023-12-04 PROCEDURE — 1159F PR MEDICATION LIST DOCUMENTED IN MEDICAL RECORD: ICD-10-PCS | Mod: HCNC,CPTII,S$GLB, | Performed by: FAMILY MEDICINE

## 2023-12-04 PROCEDURE — 85025 COMPLETE CBC W/AUTO DIFF WBC: CPT | Performed by: PHYSICIAN ASSISTANT

## 2023-12-04 PROCEDURE — 4010F ACE/ARB THERAPY RXD/TAKEN: CPT | Mod: HCNC,CPTII,S$GLB, | Performed by: FAMILY MEDICINE

## 2023-12-04 PROCEDURE — 3046F HEMOGLOBIN A1C LEVEL >9.0%: CPT | Mod: HCNC,CPTII,S$GLB, | Performed by: FAMILY MEDICINE

## 2023-12-04 PROCEDURE — 96361 HYDRATE IV INFUSION ADD-ON: CPT

## 2023-12-04 PROCEDURE — 1159F MED LIST DOCD IN RCRD: CPT | Mod: HCNC,CPTII,S$GLB, | Performed by: FAMILY MEDICINE

## 2023-12-04 PROCEDURE — 3078F PR MOST RECENT DIASTOLIC BLOOD PRESSURE < 80 MM HG: ICD-10-PCS | Mod: HCNC,CPTII,S$GLB, | Performed by: FAMILY MEDICINE

## 2023-12-04 PROCEDURE — 36415 COLL VENOUS BLD VENIPUNCTURE: CPT | Performed by: PHYSICIAN ASSISTANT

## 2023-12-04 PROCEDURE — 3066F PR DOCUMENTATION OF TREATMENT FOR NEPHROPATHY: ICD-10-PCS | Mod: HCNC,CPTII,S$GLB, | Performed by: FAMILY MEDICINE

## 2023-12-04 PROCEDURE — 82803 BLOOD GASES ANY COMBINATION: CPT

## 2023-12-04 PROCEDURE — 3066F NEPHROPATHY DOC TX: CPT | Mod: HCNC,CPTII,S$GLB, | Performed by: FAMILY MEDICINE

## 2023-12-04 PROCEDURE — 63600175 PHARM REV CODE 636 W HCPCS: Performed by: NURSE PRACTITIONER

## 2023-12-04 PROCEDURE — 63600175 PHARM REV CODE 636 W HCPCS: Performed by: EMERGENCY MEDICINE

## 2023-12-04 PROCEDURE — 99214 PR OFFICE/OUTPT VISIT, EST, LEVL IV, 30-39 MIN: ICD-10-PCS | Mod: HCNC,S$GLB,, | Performed by: FAMILY MEDICINE

## 2023-12-04 PROCEDURE — 82962 GLUCOSE BLOOD TEST: CPT

## 2023-12-04 PROCEDURE — 96374 THER/PROPH/DIAG INJ IV PUSH: CPT

## 2023-12-04 PROCEDURE — 99900035 HC TECH TIME PER 15 MIN (STAT)

## 2023-12-04 PROCEDURE — 93010 ELECTROCARDIOGRAM REPORT: CPT | Mod: ,,, | Performed by: INTERNAL MEDICINE

## 2023-12-04 PROCEDURE — 3061F NEG MICROALBUMINURIA REV: CPT | Mod: HCNC,CPTII,S$GLB, | Performed by: FAMILY MEDICINE

## 2023-12-04 PROCEDURE — 36415 COLL VENOUS BLD VENIPUNCTURE: CPT | Performed by: NURSE PRACTITIONER

## 2023-12-04 PROCEDURE — 3008F BODY MASS INDEX DOCD: CPT | Mod: HCNC,CPTII,S$GLB, | Performed by: FAMILY MEDICINE

## 2023-12-04 PROCEDURE — 1101F PT FALLS ASSESS-DOCD LE1/YR: CPT | Mod: HCNC,CPTII,S$GLB, | Performed by: FAMILY MEDICINE

## 2023-12-04 RX ORDER — AMOXICILLIN 250 MG
1 CAPSULE ORAL 2 TIMES DAILY
Status: DISCONTINUED | OUTPATIENT
Start: 2023-12-04 | End: 2023-12-05 | Stop reason: HOSPADM

## 2023-12-04 RX ORDER — INSULIN ASPART 100 [IU]/ML
1-10 INJECTION, SOLUTION INTRAVENOUS; SUBCUTANEOUS
Status: DISCONTINUED | OUTPATIENT
Start: 2023-12-04 | End: 2023-12-05 | Stop reason: HOSPADM

## 2023-12-04 RX ORDER — SODIUM,POTASSIUM PHOSPHATES 280-250MG
2 POWDER IN PACKET (EA) ORAL
Status: DISCONTINUED | OUTPATIENT
Start: 2023-12-04 | End: 2023-12-05 | Stop reason: HOSPADM

## 2023-12-04 RX ORDER — AMLODIPINE BESYLATE 5 MG/1
5 TABLET ORAL DAILY
Status: DISCONTINUED | OUTPATIENT
Start: 2023-12-05 | End: 2023-12-05 | Stop reason: HOSPADM

## 2023-12-04 RX ORDER — FUROSEMIDE 40 MG/1
40 TABLET ORAL 2 TIMES DAILY
Status: DISCONTINUED | OUTPATIENT
Start: 2023-12-04 | End: 2023-12-04

## 2023-12-04 RX ORDER — ESCITALOPRAM OXALATE 10 MG/1
10 TABLET ORAL 2 TIMES DAILY
Status: DISCONTINUED | OUTPATIENT
Start: 2023-12-04 | End: 2023-12-05 | Stop reason: HOSPADM

## 2023-12-04 RX ORDER — ASPIRIN 81 MG/1
81 TABLET ORAL DAILY
Status: DISCONTINUED | OUTPATIENT
Start: 2023-12-05 | End: 2023-12-05 | Stop reason: HOSPADM

## 2023-12-04 RX ORDER — LISINOPRIL 10 MG/1
10 TABLET ORAL DAILY
Status: DISCONTINUED | OUTPATIENT
Start: 2023-12-05 | End: 2023-12-05 | Stop reason: HOSPADM

## 2023-12-04 RX ORDER — GLUCAGON 1 MG
1 KIT INJECTION
Status: DISCONTINUED | OUTPATIENT
Start: 2023-12-04 | End: 2023-12-05 | Stop reason: HOSPADM

## 2023-12-04 RX ORDER — NALOXONE HCL 0.4 MG/ML
0.02 VIAL (ML) INJECTION
Status: DISCONTINUED | OUTPATIENT
Start: 2023-12-04 | End: 2023-12-05 | Stop reason: HOSPADM

## 2023-12-04 RX ORDER — MAG HYDROX/ALUMINUM HYD/SIMETH 200-200-20
30 SUSPENSION, ORAL (FINAL DOSE FORM) ORAL 4 TIMES DAILY PRN
Status: DISCONTINUED | OUTPATIENT
Start: 2023-12-04 | End: 2023-12-05 | Stop reason: HOSPADM

## 2023-12-04 RX ORDER — ATORVASTATIN CALCIUM 20 MG/1
20 TABLET, FILM COATED ORAL NIGHTLY
Status: DISCONTINUED | OUTPATIENT
Start: 2023-12-04 | End: 2023-12-05 | Stop reason: HOSPADM

## 2023-12-04 RX ORDER — LANOLIN ALCOHOL/MO/W.PET/CERES
800 CREAM (GRAM) TOPICAL
Status: DISCONTINUED | OUTPATIENT
Start: 2023-12-04 | End: 2023-12-05 | Stop reason: HOSPADM

## 2023-12-04 RX ORDER — SODIUM CHLORIDE 9 MG/ML
INJECTION, SOLUTION INTRAVENOUS CONTINUOUS
Status: DISCONTINUED | OUTPATIENT
Start: 2023-12-04 | End: 2023-12-05

## 2023-12-04 RX ORDER — ENOXAPARIN SODIUM 100 MG/ML
40 INJECTION SUBCUTANEOUS EVERY 24 HOURS
Status: DISCONTINUED | OUTPATIENT
Start: 2023-12-04 | End: 2023-12-05 | Stop reason: HOSPADM

## 2023-12-04 RX ORDER — IBUPROFEN 200 MG
24 TABLET ORAL
Status: DISCONTINUED | OUTPATIENT
Start: 2023-12-04 | End: 2023-12-05 | Stop reason: HOSPADM

## 2023-12-04 RX ORDER — CLOPIDOGREL BISULFATE 75 MG/1
75 TABLET ORAL DAILY
Status: DISCONTINUED | OUTPATIENT
Start: 2023-12-05 | End: 2023-12-05 | Stop reason: HOSPADM

## 2023-12-04 RX ORDER — SODIUM CHLORIDE 0.9 % (FLUSH) 0.9 %
10 SYRINGE (ML) INJECTION EVERY 12 HOURS PRN
Status: DISCONTINUED | OUTPATIENT
Start: 2023-12-04 | End: 2023-12-05 | Stop reason: HOSPADM

## 2023-12-04 RX ORDER — ONDANSETRON 2 MG/ML
4 INJECTION INTRAMUSCULAR; INTRAVENOUS EVERY 8 HOURS PRN
Status: DISCONTINUED | OUTPATIENT
Start: 2023-12-04 | End: 2023-12-05 | Stop reason: HOSPADM

## 2023-12-04 RX ORDER — METOPROLOL SUCCINATE 50 MG/1
100 TABLET, EXTENDED RELEASE ORAL DAILY
Status: DISCONTINUED | OUTPATIENT
Start: 2023-12-05 | End: 2023-12-05 | Stop reason: HOSPADM

## 2023-12-04 RX ORDER — TALC
6 POWDER (GRAM) TOPICAL NIGHTLY PRN
Status: DISCONTINUED | OUTPATIENT
Start: 2023-12-04 | End: 2023-12-05 | Stop reason: HOSPADM

## 2023-12-04 RX ORDER — SIMETHICONE 80 MG
1 TABLET,CHEWABLE ORAL 4 TIMES DAILY PRN
Status: DISCONTINUED | OUTPATIENT
Start: 2023-12-04 | End: 2023-12-05 | Stop reason: HOSPADM

## 2023-12-04 RX ORDER — IBUPROFEN 200 MG
16 TABLET ORAL
Status: DISCONTINUED | OUTPATIENT
Start: 2023-12-04 | End: 2023-12-05 | Stop reason: HOSPADM

## 2023-12-04 RX ORDER — ACETAMINOPHEN 325 MG/1
650 TABLET ORAL EVERY 8 HOURS PRN
Status: DISCONTINUED | OUTPATIENT
Start: 2023-12-04 | End: 2023-12-05 | Stop reason: HOSPADM

## 2023-12-04 RX ADMIN — SODIUM CHLORIDE: 9 INJECTION, SOLUTION INTRAVENOUS at 10:12

## 2023-12-04 RX ADMIN — INSULIN HUMAN 7 UNITS: 100 INJECTION, SOLUTION PARENTERAL at 05:12

## 2023-12-04 RX ADMIN — INSULIN ASPART 3 UNITS: 100 INJECTION, SOLUTION INTRAVENOUS; SUBCUTANEOUS at 11:12

## 2023-12-04 RX ADMIN — SODIUM CHLORIDE 1000 ML: 9 INJECTION, SOLUTION INTRAVENOUS at 03:12

## 2023-12-04 RX ADMIN — INSULIN DETEMIR 10 UNITS: 100 INJECTION, SOLUTION SUBCUTANEOUS at 11:12

## 2023-12-04 RX ADMIN — ATORVASTATIN CALCIUM 20 MG: 20 TABLET, FILM COATED ORAL at 11:12

## 2023-12-04 RX ADMIN — ENOXAPARIN SODIUM 40 MG: 40 INJECTION SUBCUTANEOUS at 11:12

## 2023-12-04 NOTE — FIRST PROVIDER EVALUATION
Emergency Department TeleTriage Encounter Note      CHIEF COMPLAINT    Chief Complaint   Patient presents with    Hyperglycemia     Sent from doctors office        VITAL SIGNS   Initial Vitals [12/04/23 1430]   BP Pulse Resp Temp SpO2   (!) 115/55 72 20 97.5 °F (36.4 °C) 95 %      MAP       --            ALLERGIES    Review of patient's allergies indicates:  No Known Allergies    PROVIDER TRIAGE NOTE  Patient was sent from PCP office due to hyperglycemia. She has been complaint with medications. Reports fatigue recently.       ORDERS  Labs Reviewed - No data to display    ED Orders (720h ago, onward)      None              Virtual Visit Note: The provider triage portion of this emergency department evaluation and documentation was performed via [employer], a HIPAA-compliant telemedicine application, in concert with a tele-presenter in the room. A face to face patient evaluation with one of my colleagues will occur once the patient is placed in an emergency department room.      DISCLAIMER: This note was prepared with [employer] voice recognition transcription software. Garbled syntax, mangled pronouns, and other bizarre constructions may be attributed to that software system.

## 2023-12-04 NOTE — TELEPHONE ENCOUNTER
Call placed to patient who states she has been receiving calls from [facility] number where caller is offering a back brace.  Patient states due to this she has not been answering phone calls from [facility] number.  Patient state she did not receive the message to go to the hospital over the weekend.  Patient states she felt bad the day of lab draw, states she was experiencing dizziness, but thought this was due to not eating overnight.  Patient denies symptoms today. States her glucometer is broken and she is not able check her blood sugar.  Patient states she is not able to afford a new glucometer at this time. Patient states she was in [place] at the time of call, and plans to attend today's office appointment. Will send follow up message to Dr. Marie for notification.

## 2023-12-04 NOTE — ED PROVIDER NOTES
Encounter Date: 2023       History     Chief Complaint   Patient presents with    Hyperglycemia     Sent from doctors office      68-year-old female presents the emergency room as a referral from primary care for evaluation of hyperglycemia.  She has polydipsia polyuria and general fatigue but denies any infections chest pain shortness of breath fevers.  She is compliant with her metformin.  She is not on insulin or any other medications for diabetes.  Several days ago she would a glucose of 470 primary care but they were unable to contact her.      Review of patient's allergies indicates:  No Known Allergies  Past Medical History:   Diagnosis Date    Anxiety     CAD (coronary artery disease)     CHF (congestive heart failure)     Chronic low back pain     Diabetes 2012    Diabetes mellitus     Hyperkalemia 2022    Hypertension     Leukocytosis 2022    Metabolic acidosis with respiratory acidosis 2022     Past Surgical History:   Procedure Laterality Date    CARDIAC DEFIBRILLATOR PLACEMENT      CORONARY ARTERY BYPASS GRAFT      HYSTERECTOMY       Family History   Problem Relation Age of Onset    Diabetes Mother     Mental illness Brother     Heart disease Brother         MI    Cerebral aneurysm Brother      Social History     Tobacco Use    Smoking status: Former     Current packs/day: 0.00     Types: Cigarettes     Quit date:      Years since quittin.9    Smokeless tobacco: Never   Substance Use Topics    Alcohol use: No     Review of Systems   Constitutional:  Positive for fatigue. Negative for appetite change, chills, diaphoresis and fever.   HENT:  Negative for ear pain, rhinorrhea and sore throat.    Eyes:  Negative for pain and visual disturbance.   Respiratory:  Negative for cough and shortness of breath.    Cardiovascular:  Negative for chest pain and leg swelling.   Gastrointestinal:  Negative for abdominal pain, diarrhea, nausea and vomiting.   Endocrine: Positive  for polydipsia and polyuria.   Genitourinary:  Negative for decreased urine volume, difficulty urinating, dysuria, flank pain and pelvic pain.   Musculoskeletal:  Negative for arthralgias.   Skin:  Negative for rash.   Neurological:  Negative for weakness, numbness and headaches.   All other systems reviewed and are negative.      Physical Exam     Initial Vitals [12/04/23 1430]   BP Pulse Resp Temp SpO2   (!) 115/55 72 20 97.5 °F (36.4 °C) 95 %      MAP       --         Physical Exam    Nursing note and vitals reviewed.  Constitutional: She appears well-developed and well-nourished.  Non-toxic appearance. No distress.   HENT:   Head: Normocephalic and atraumatic.   Dry mouth   Eyes: Conjunctivae and EOM are normal. Pupils are equal, round, and reactive to light.   Neck: Neck supple.   Normal range of motion.  Cardiovascular:  Normal rate, regular rhythm, normal heart sounds and intact distal pulses.     Exam reveals no gallop and no friction rub.       No murmur heard.  Pulmonary/Chest: Breath sounds normal. No respiratory distress. She has no decreased breath sounds. She has no wheezes. She has no rhonchi. She has no rales.   Abdominal: Abdomen is soft. Bowel sounds are normal. She exhibits no distension. There is no abdominal tenderness.   Musculoskeletal:         General: No edema. Normal range of motion.      Cervical back: Normal range of motion and neck supple.     Neurological: She is alert and oriented to person, place, and time. She has normal strength. No sensory deficit. GCS score is 15. GCS eye subscore is 4. GCS verbal subscore is 5. GCS motor subscore is 6.   Skin: Skin is warm and dry. No rash noted.   Psychiatric: She has a normal mood and affect.         ED Course   Procedures  Labs Reviewed   CBC W/ AUTO DIFFERENTIAL - Abnormal; Notable for the following components:       Result Value    WBC 13.76 (*)     Gran # (ANC) 8.6 (*)     Immature Grans (Abs) 0.07 (*)     Mono # 1.6 (*)     All other  components within normal limits   COMPREHENSIVE METABOLIC PANEL - Abnormal; Notable for the following components:    Sodium 125 (*)     Potassium 5.4 (*)     Chloride 89 (*)     CO2 20 (*)     Glucose 492 (*)     BUN 44 (*)     Creatinine 1.6 (*)     eGFR 34 (*)     All other components within normal limits    Narrative:     Glucose critical result(s) called and verbal readback obtained from   Dr Mota by [facility] 12/04/2023 17:04   URINALYSIS, REFLEX TO URINE CULTURE - Abnormal; Notable for the following components:    Color, UA Colorless (*)     Glucose, UA 4+ (*)     All other components within normal limits    Narrative:     Specimen Source->Urine   POCT GLUCOSE - Abnormal; Notable for the following components:    POCT Glucose >500 (*)     All other components within normal limits   ISTAT PROCEDURE - Abnormal; Notable for the following components:    POC HCO3 29.1 (*)     POC BE 5 (*)     POC TCO2 30 (*)     All other components within normal limits   POCT GLUCOSE - Abnormal; Notable for the following components:    POCT Glucose 370 (*)     All other components within normal limits   BETA - HYDROXYBUTYRATE, SERUM   URINALYSIS MICROSCOPIC    Narrative:     Specimen Source->Urine   HEMOGLOBIN A1C    Narrative:     Collection has been rescheduled by DTT at 12/04/2023 15:28 Reason:   Add on Juliana   POCT GLUCOSE MONITORING CONTINUOUS          Imaging Results              X-Ray Chest AP Portable (Final result)  Result time 12/04/23 15:09:23      Final result by Nicky Waterman MD (12/04/23 15:09:23)                   Impression:      No acute abnormality.      Electronically signed by: Nicky Waterman MD  Date:    12/04/2023  Time:    15:09               Narrative:    EXAMINATION:  XR CHEST AP PORTABLE    CLINICAL HISTORY:  hyperglycemia;    TECHNIQUE:  Single frontal view of the chest was performed.    COMPARISON:  None    FINDINGS:  The lungs are clear, with normal appearance of pulmonary vasculature and no pleural  effusion or pneumothorax.    The cardiac silhouette is normal in size. The hilar and mediastinal contours are unremarkable.    Bones are intact.  There is a left-sided cardiac pacer.                                       Medications   insulin regular injection 7 Units 0.07 mL (has no administration in time range)   sodium chloride 0.9% bolus 1,000 mL 1,000 mL (0 mLs Intravenous Stopped 12/4/23 1640)     Medical Decision Making  Patient is a 68-year-old female with a past medical history of diabetes who is slipping into uncontrolled hyperglycemia despite being compliant with her metformin.  It is time to transition to insulin based upon her outpatient hemoglobin A1c done a few days ago.  Thankfully she does not have any signs of diabetic ketoacidosis or an infectious process exacerbating her glucose at this time.  I think would be best to admit her to the hospital for fluids given her acute kidney insufficiency and initiation of insulin while holding metformin.  She was actually sent to the emergency room by primary care for this same concern.    Amount and/or Complexity of Data Reviewed  External Data Reviewed: notes.     Details: I reviewed clinic notes  Labs: ordered.  Discussion of management or test interpretation with external provider(s): I discuss the case with the hospitalist    Risk  OTC drugs.  Prescription drug management.  Decision regarding hospitalization.               ED Course as of 12/04/23 1717   Mon Dec 04, 2023   1558 EKG independently interpreted by me as 69 normal sinus rhythm normal axis and intervals no significant ST segment elevation or depression mild AZ depression lead 2 3 and AVF [JS]      ED Course User Index  [JS] Dwight Mota MD                           Clinical Impression:  Final diagnoses:  [R73.9] Hyperglycemia  [N28.9] Acute kidney insufficiency (Primary)          ED Disposition Condition    Observation                 Dwight Mota MD  12/04/23 2047

## 2023-12-04 NOTE — PROGRESS NOTES
Subjective:   Patient ID: Celine Steen is a 68 y.o. female     Chief Complaint:Follow-up (6 month f/u)      Here for f/u. Noted elevated blood sugar over the weekend and on call physcian instructed she go to the ER. Did not seek care. Notes feeling thirsty but no other new complaints.    Follow-up  Pertinent negatives include no abdominal pain or chest pain.     Review of Systems   Respiratory:  Negative for shortness of breath.    Cardiovascular:  Negative for chest pain.   Gastrointestinal:  Negative for abdominal pain.   Genitourinary:  Negative for dysuria.     Past Medical History:   Diagnosis Date    Anxiety     CAD (coronary artery disease)     CHF (congestive heart failure)     Chronic low back pain     Diabetes 2/1/2012    Diabetes mellitus     Hyperkalemia 2/13/2022    Hypertension     Leukocytosis 2/13/2022    Metabolic acidosis with respiratory acidosis 2/13/2022     Past Surgical History:   Procedure Laterality Date    CARDIAC DEFIBRILLATOR PLACEMENT  2007    CORONARY ARTERY BYPASS GRAFT  2007    HYSTERECTOMY       Objective:     Vitals:    12/04/23 1318   BP: 96/60   Pulse: 81   Resp: 17   Temp: 97.6 °F (36.4 °C)     Body mass index is 31.28 kg/m².  Physical Exam  Vitals and nursing note reviewed.   Constitutional:       Appearance: She is well-developed.   HENT:      Head: Normocephalic and atraumatic.   Eyes:      General: No scleral icterus.     Conjunctiva/sclera: Conjunctivae normal.   Pulmonary:      Effort: Pulmonary effort is normal. No respiratory distress.   Musculoskeletal:         General: No deformity. Normal range of motion.      Cervical back: Normal range of motion and neck supple.   Skin:     Coloration: Skin is not pale.      Findings: No rash.   Neurological:      Mental Status: She is alert and oriented to person, place, and time.   Psychiatric:         Behavior: Behavior normal.         Thought Content: Thought content normal.         Judgment: Judgment normal.        Assessment:     1. Hyperglycemia      Plan:   Hyperglycemia  -     POCT Glucose, Hand-Held Device      Counseled. Advised go directly to ER. Discussed risks and possible expectations for treatment plan. Discussed with ER staff case. Will go by personal vehicle with friend who is driving.      Total time spent of Greater than 30 minutes minutes on the day of the visit.This includes face to face time and preparing to see the patient, obtaining and reviewing separately obtained history, documenting clinical information in the electronic or other health record, independently interpreting results and communicating results to the patient/family/caregiver, or care coordinator.    Established patient with me has been instructed that must see me at least 1 time yearly (every 365 days) for refills of medications. Seeing other providers in this clinic is fine but expectation is to see me yearly.    Natan Marie MD  12/04/2023    Portions of this note have been dictated with NAHID Slade

## 2023-12-04 NOTE — TELEPHONE ENCOUNTER
----- Message from Natan Marie MD sent at 12/4/2023  9:28 AM CST -----  Blood sugar is dangerously. Elevated this was handled by the on call physician this weekend. Please contact patient and see if she has been to the emergency room yet.

## 2023-12-05 VITALS
BODY MASS INDEX: 34.56 KG/M2 | OXYGEN SATURATION: 94 % | RESPIRATION RATE: 16 BRPM | HEART RATE: 81 BPM | SYSTOLIC BLOOD PRESSURE: 153 MMHG | DIASTOLIC BLOOD PRESSURE: 66 MMHG | TEMPERATURE: 97 F | WEIGHT: 187.81 LBS | HEIGHT: 62 IN

## 2023-12-05 LAB
ALBUMIN SERPL BCP-MCNC: 3.2 G/DL (ref 3.5–5.2)
ALP SERPL-CCNC: 59 U/L (ref 55–135)
ALT SERPL W/O P-5'-P-CCNC: 20 U/L (ref 10–44)
ANION GAP SERPL CALC-SCNC: 10 MMOL/L (ref 8–16)
AST SERPL-CCNC: 23 U/L (ref 10–40)
BASOPHILS # BLD AUTO: 0.07 K/UL (ref 0–0.2)
BASOPHILS NFR BLD: 0.6 % (ref 0–1.9)
BILIRUB SERPL-MCNC: 0.2 MG/DL (ref 0.1–1)
BUN SERPL-MCNC: 37 MG/DL (ref 8–23)
CALCIUM SERPL-MCNC: 9.5 MG/DL (ref 8.7–10.5)
CHLORIDE SERPL-SCNC: 101 MMOL/L (ref 95–110)
CO2 SERPL-SCNC: 24 MMOL/L (ref 23–29)
CREAT SERPL-MCNC: 1.4 MG/DL (ref 0.5–1.4)
DIFFERENTIAL METHOD: ABNORMAL
EOSINOPHIL # BLD AUTO: 0.3 K/UL (ref 0–0.5)
EOSINOPHIL NFR BLD: 2.7 % (ref 0–8)
ERYTHROCYTE [DISTWIDTH] IN BLOOD BY AUTOMATED COUNT: 13.2 % (ref 11.5–14.5)
EST. GFR  (NO RACE VARIABLE): 40 ML/MIN/1.73 M^2
ESTIMATED AVG GLUCOSE: 384 MG/DL (ref 68–131)
GLUCOSE SERPL-MCNC: 375 MG/DL (ref 70–110)
HBA1C MFR BLD: 15 % (ref 4.5–6.2)
HCT VFR BLD AUTO: 37.5 % (ref 37–48.5)
HGB BLD-MCNC: 12.2 G/DL (ref 12–16)
IMM GRANULOCYTES # BLD AUTO: 0.05 K/UL (ref 0–0.04)
IMM GRANULOCYTES NFR BLD AUTO: 0.4 % (ref 0–0.5)
LYMPHOCYTES # BLD AUTO: 3.8 K/UL (ref 1–4.8)
LYMPHOCYTES NFR BLD: 31.2 % (ref 18–48)
MAGNESIUM SERPL-MCNC: 2.2 MG/DL (ref 1.6–2.6)
MCH RBC QN AUTO: 28.4 PG (ref 27–31)
MCHC RBC AUTO-ENTMCNC: 32.5 G/DL (ref 32–36)
MCV RBC AUTO: 87 FL (ref 82–98)
MONOCYTES # BLD AUTO: 1.5 K/UL (ref 0.3–1)
MONOCYTES NFR BLD: 12.2 % (ref 4–15)
NEUTROPHILS # BLD AUTO: 6.4 K/UL (ref 1.8–7.7)
NEUTROPHILS NFR BLD: 52.9 % (ref 38–73)
NRBC BLD-RTO: 0 /100 WBC
PHOSPHATE SERPL-MCNC: 3.8 MG/DL (ref 2.7–4.5)
PLATELET # BLD AUTO: 313 K/UL (ref 150–450)
PMV BLD AUTO: 10.7 FL (ref 9.2–12.9)
POCT GLUCOSE: 278 MG/DL (ref 70–110)
POCT GLUCOSE: 313 MG/DL (ref 70–110)
POCT GLUCOSE: 341 MG/DL (ref 70–110)
POTASSIUM SERPL-SCNC: 5 MMOL/L (ref 3.5–5.1)
PROT SERPL-MCNC: 7.5 G/DL (ref 6–8.4)
RBC # BLD AUTO: 4.3 M/UL (ref 4–5.4)
SODIUM SERPL-SCNC: 135 MMOL/L (ref 136–145)
WBC # BLD AUTO: 12.07 K/UL (ref 3.9–12.7)

## 2023-12-05 PROCEDURE — 84100 ASSAY OF PHOSPHORUS: CPT | Performed by: NURSE PRACTITIONER

## 2023-12-05 PROCEDURE — G0378 HOSPITAL OBSERVATION PER HR: HCPCS

## 2023-12-05 PROCEDURE — 36415 COLL VENOUS BLD VENIPUNCTURE: CPT | Performed by: NURSE PRACTITIONER

## 2023-12-05 PROCEDURE — 63600175 PHARM REV CODE 636 W HCPCS: Performed by: NURSE PRACTITIONER

## 2023-12-05 PROCEDURE — 83735 ASSAY OF MAGNESIUM: CPT | Performed by: NURSE PRACTITIONER

## 2023-12-05 PROCEDURE — 96372 THER/PROPH/DIAG INJ SC/IM: CPT | Performed by: NURSE PRACTITIONER

## 2023-12-05 PROCEDURE — 85025 COMPLETE CBC W/AUTO DIFF WBC: CPT | Performed by: NURSE PRACTITIONER

## 2023-12-05 PROCEDURE — 94761 N-INVAS EAR/PLS OXIMETRY MLT: CPT

## 2023-12-05 PROCEDURE — 80053 COMPREHEN METABOLIC PANEL: CPT | Performed by: NURSE PRACTITIONER

## 2023-12-05 PROCEDURE — 99900031 HC PATIENT EDUCATION (STAT)

## 2023-12-05 PROCEDURE — 25000003 PHARM REV CODE 250: Performed by: NURSE PRACTITIONER

## 2023-12-05 RX ORDER — INSULIN PUMP SYRINGE, 3 ML
EACH MISCELLANEOUS
Qty: 1 EACH | Refills: 0 | Status: SHIPPED | OUTPATIENT
Start: 2023-12-05 | End: 2024-12-04

## 2023-12-05 RX ORDER — LANCETS
1 EACH MISCELLANEOUS 4 TIMES DAILY
Qty: 200 EACH | Refills: 0 | Status: SHIPPED | OUTPATIENT
Start: 2023-12-05 | End: 2024-02-22 | Stop reason: SDUPTHER

## 2023-12-05 RX ORDER — INSULIN ASPART 100 [IU]/ML
5 INJECTION, SOLUTION INTRAVENOUS; SUBCUTANEOUS 3 TIMES DAILY
Qty: 10 EACH | Refills: 0 | Status: SHIPPED | OUTPATIENT
Start: 2023-12-05 | End: 2023-12-15

## 2023-12-05 RX ORDER — INSULIN ASPART 100 [IU]/ML
10 INJECTION, SOLUTION INTRAVENOUS; SUBCUTANEOUS
Status: DISCONTINUED | OUTPATIENT
Start: 2023-12-05 | End: 2023-12-05 | Stop reason: HOSPADM

## 2023-12-05 RX ORDER — INSULIN ASPART 100 [IU]/ML
10 INJECTION, SOLUTION INTRAVENOUS; SUBCUTANEOUS 3 TIMES DAILY
Qty: 10 EACH | Refills: 0 | Status: SHIPPED | OUTPATIENT
Start: 2023-12-05 | End: 2023-12-05 | Stop reason: SDUPTHER

## 2023-12-05 RX ADMIN — INSULIN ASPART 8 UNITS: 100 INJECTION, SOLUTION INTRAVENOUS; SUBCUTANEOUS at 11:12

## 2023-12-05 RX ADMIN — ASPIRIN 81 MG: 81 TABLET, COATED ORAL at 09:12

## 2023-12-05 RX ADMIN — INSULIN ASPART 10 UNITS: 100 INJECTION, SOLUTION INTRAVENOUS; SUBCUTANEOUS at 05:12

## 2023-12-05 RX ADMIN — AMLODIPINE BESYLATE 5 MG: 5 TABLET ORAL at 09:12

## 2023-12-05 RX ADMIN — ENOXAPARIN SODIUM 40 MG: 40 INJECTION SUBCUTANEOUS at 05:12

## 2023-12-05 RX ADMIN — ESCITALOPRAM OXALATE 10 MG: 10 TABLET, FILM COATED ORAL at 09:12

## 2023-12-05 RX ADMIN — LISINOPRIL 10 MG: 10 TABLET ORAL at 09:12

## 2023-12-05 RX ADMIN — CLOPIDOGREL BISULFATE 75 MG: 75 TABLET, FILM COATED ORAL at 09:12

## 2023-12-05 RX ADMIN — INSULIN ASPART 6 UNITS: 100 INJECTION, SOLUTION INTRAVENOUS; SUBCUTANEOUS at 05:12

## 2023-12-05 RX ADMIN — INSULIN ASPART 8 UNITS: 100 INJECTION, SOLUTION INTRAVENOUS; SUBCUTANEOUS at 07:12

## 2023-12-05 RX ADMIN — METOPROLOL SUCCINATE 100 MG: 50 TABLET, EXTENDED RELEASE ORAL at 09:12

## 2023-12-05 RX ADMIN — INSULIN ASPART 10 UNITS: 100 INJECTION, SOLUTION INTRAVENOUS; SUBCUTANEOUS at 11:12

## 2023-12-05 NOTE — H&P
[facility]  History & Physical    Patient Name: Celine Steen  MRN: [id number]  Patient Class: OP- Observation  Admission Date: 12/4/2023  Attending Physician: Gricelda Alfaro MD   Primary Care Provider: Natan Marie MD         Patient information was obtained from patient, past medical records, and ER records.     Subjective:     Principal Problem:Hyperglycemia    Chief Complaint:   Chief Complaint   Patient presents with    Hyperglycemia     Sent from doctors office         HPI: Celine Steen is a 68-year-old female with past medical history significant for type 2 diabetes, hypertension, CHF, CAD, and chronic back pain who presented to the emergency department at the direction of her primary care physician for further assessment of hyperglycemia.  She had routine labs at her doctor's office a few days ago with a glucose of 470 and potassium of 5.7; however, her doctor's office was unable to get in touch with her until today.  Labs today in the emergency department with similar findings with glucose 492 and potassium 5.4.  Patient states she takes metformin at home but has never been on insulin.  Reports polydipsia and general fatigue but no other symptoms.  She will be placed in observation under the service of hospital Medicine for continued medical management.        Past Medical History:   Diagnosis Date    Anxiety     CAD (coronary artery disease)     CHF (congestive heart failure)     Chronic low back pain     Diabetes 2/1/2012    Diabetes mellitus     Hyperkalemia 2/13/2022    Hypertension     Leukocytosis 2/13/2022    Metabolic acidosis with respiratory acidosis 2/13/2022       Past Surgical History:   Procedure Laterality Date    CARDIAC DEFIBRILLATOR PLACEMENT  2007    CORONARY ARTERY BYPASS GRAFT  2007    HYSTERECTOMY         Review of patient's allergies indicates:  No Known Allergies    No current facility-administered medications on file prior to  encounter.     Current Outpatient Medications on File Prior to Encounter   Medication Sig    amLODIPine (NORVASC) 5 MG tablet Take 1 tablet (5 mg total) by mouth once daily.    aspirin (ECOTRIN) 81 MG EC tablet Take 81 mg by mouth once daily.    azelastine (ASTELIN) 137 mcg (0.1 %) nasal spray 1 spray (137 mcg total) by Nasal route 2 (two) times daily. for 7 days    clopidogreL (PLAVIX) 75 mg tablet Take 1 tablet (75 mg total) by mouth once daily.    coenzyme Q10 200 mg capsule Take 200 mg by mouth once daily.    EScitalopram oxalate (LEXAPRO) 20 MG tablet TAKE 1/2 TABLET BY MOUTH TWICE DAILY    furosemide (LASIX) 40 MG tablet Take 1 tablet (40 mg total) by mouth 2 (two) times daily.    lisinopriL 10 MG tablet Take 1 tablet (10 mg total) by mouth once daily.    metFORMIN (GLUCOPHAGE) 500 MG tablet TAKE 1 TABLET(500 MG) BY MOUTH TWICE DAILY (Patient taking differently: Take 500 mg by mouth 2 (two) times daily with meals.)    metoprolol succinate (TOPROL-XL) 100 MG 24 hr tablet Take 1 tablet (100 mg total) by mouth once daily.    mirtazapine (REMERON) 7.5 MG Tab Take 1 tablet (7.5 mg total) by mouth every evening.    nitroGLYCERIN (NITROSTAT) 0.3 MG SL tablet Place 1 tablet (0.3 mg total) under the tongue every 5 (five) minutes as needed for Chest pain.    ondansetron (ZOFRAN-ODT) 4 MG TbDL Take 1 tablet (4 mg total) by mouth every 8 (eight) hours as needed (nauseau).    simvastatin (ZOCOR) 40 MG tablet Take 1 tablet (40 mg total) by mouth every evening.     Family History       Problem Relation (Age of Onset)    Cerebral aneurysm Brother    Diabetes Mother    Heart disease Brother    Mental illness Brother          Tobacco Use    Smoking status: Former     Current packs/day: 0.00     Types: Cigarettes     Quit date:      Years since quittin.9    Smokeless tobacco: Never   Substance and Sexual Activity    Alcohol use: No    Drug use: Not on file    Sexual activity: Not on file     Review of Systems    Constitutional:  Positive for fatigue. Negative for appetite change, chills and fever.   Respiratory:  Negative for cough, shortness of breath and wheezing.    Cardiovascular:  Negative for chest pain and palpitations.   Gastrointestinal:  Negative for abdominal pain, diarrhea, nausea and vomiting.   Endocrine: Positive for polydipsia. Negative for polyuria.   Neurological:  Negative for weakness.   Psychiatric/Behavioral:  Negative for agitation and confusion.    All other systems reviewed and are negative.    Objective:     Vital Signs (Most Recent):  Temp: 97.5 °F (36.4 °C) (12/04/23 1430)  Pulse: 69 (12/04/23 1830)  Resp: 18 (12/04/23 1830)  BP: (!) 103/53 (12/04/23 1830)  SpO2: 96 % (12/04/23 1830) Vital Signs (24h Range):  Temp:  [97.5 °F (36.4 °C)-97.6 °F (36.4 °C)] 97.5 °F (36.4 °C)  Pulse:  [66-81] 69  Resp:  [17-20] 18  SpO2:  [95 %-98 %] 96 %  BP: ()/(53-71) 103/53     Weight: 79.8 kg (176 lb)  Body mass index is 34.37 kg/m².     Physical Exam  Constitutional:       General: She is not in acute distress.     Appearance: She is well-developed. She is not ill-appearing.   HENT:      Head: Normocephalic and atraumatic.   Eyes:      Conjunctiva/sclera: Conjunctivae normal.      Pupils: Pupils are equal, round, and reactive to light.   Cardiovascular:      Rate and Rhythm: Normal rate and regular rhythm.      Heart sounds: Normal heart sounds.      Comments: Cardiac device left chest   Pulmonary:      Effort: Pulmonary effort is normal. No respiratory distress.      Breath sounds: Normal breath sounds.   Abdominal:      General: Bowel sounds are normal. There is no distension.      Palpations: Abdomen is soft.      Tenderness: There is no abdominal tenderness.   Musculoskeletal:         General: Normal range of motion.      Cervical back: Normal range of motion and neck supple.      Right lower leg: No edema.      Left lower leg: No edema.   Skin:     General: Skin is warm and dry.   Neurological:       "General: No focal deficit present.      Mental Status: She is alert and oriented to person, place, and time.   Psychiatric:         Mood and Affect: Mood normal.         Behavior: Behavior normal.         Thought Content: Thought content normal.         Judgment: Judgment normal.              CRANIAL NERVES     CN III, IV, VI   Pupils are equal, round, and reactive to light.       Significant Labs: All pertinent labs within the past 24 hours have been reviewed.  A1C:   Recent Labs   Lab 12/01/23  1416   HGBA1C >14.0*     CBC:   Recent Labs   Lab 12/04/23  1519   WBC 13.76*   HGB 13.5   HCT 41.1        CMP:   Recent Labs   Lab 12/04/23  1519 12/04/23  1845   * 130*   K 5.4* 4.4   CL 89* 96   CO2 20* 25   * 269*   BUN 44* 38*   CREATININE 1.6* 1.3   CALCIUM 10.3 9.7   PROT 8.0  --    ALBUMIN 3.8  --    BILITOT 0.3  --    ALKPHOS 55  --    AST 17  --    ALT 23  --    ANIONGAP 16 9     TSH: No results for input(s): "TSH" in the last 4320 hours.  Urine Studies:   Recent Labs   Lab 12/04/23  1613   COLORU Colorless*   APPEARANCEUA Clear   PHUR 6.0   SPECGRAV 1.015   PROTEINUA Negative   GLUCUA 4+*   KETONESU Negative   BILIRUBINUA Negative   OCCULTUA Negative   NITRITE Negative   UROBILINOGEN Negative   LEUKOCYTESUR Negative   RBCUA 0   WBCUA 1   BACTERIA Rare   SQUAMEPITHEL 0       Significant Imaging: I have reviewed all pertinent imaging results/findings within the past 24 hours.  Chest x-ray with no acute findings  Assessment/Plan:     * Hyperglycemia  IV fluid hydration  Was given IV insulin while in the emergency department.  Reports compliance with her metformin.  Hemoglobin A1c greater than 14  Diabetes educator   Will likely need to be discharged on SQ insulin  Consider ambulatory referral to Endocrinology  Continue Accu-Cheks q.4 hours with sliding scale insulin coverage    YADI (acute kidney injury)  Patient with acute kidney injury/acute renal failure likely due to pre-renal azotemia due to " "dehydration YADI is currently improving. Baseline creatinine  0.9  - Labs reviewed- Renal function/electrolytes with Estimated Creatinine Clearance: 37.6 mL/min (based on SCr of 1.3 mg/dL). according to latest data. Monitor urine output and serial BMP and adjust therapy as needed. Avoid nephrotoxins and renally dose meds for GFR listed above.    Chronic combined systolic and diastolic heart failure  Patient is identified as having Combined Systolic and Diastolic heart failure that is Chronic. CHF is currently controlled. Latest ECHO performed and demonstrates- Results for orders placed during the hospital encounter of 02/13/22    Echo    Interpretation Summary  · The estimated ejection fraction is 35%.  · The left ventricle is normal in size with mild concentric hypertrophy and moderately decreased systolic function.  · Grade II left ventricular diastolic dysfunction.  · Moderate left atrial enlargement.  · There is moderate left ventricular global hypokinesis.  · Mild right atrial enlargement.  · Mild mitral regurgitation.  · Mild tricuspid regurgitation.  . Continue Beta Blocker ACE/ARB and monitor clinical status closely. Monitor on telemetry. Patient is off CHF pathway.  Monitor strict Is&Os and daily weights.  Cardiology is not consulted. Continue to stress to patient importance of self efficacy and  on diet for CHF. Last BNP reviewed- and noted below No results for input(s): "BNP", "BNPTRIAGEBLO" in the last 168 hours..            PVD (peripheral vascular disease)    Chronic.  Continue chronic aspirin    ICD (implantable cardioverter-defibrillator) in place  Noted      Bipolar 1 disorder  Chronic.  Continue chronic meds.      Type 2 diabetes mellitus with circulatory disorder  Patient's FSGs are uncontrolled due to hyperglycemia on current medication regimen.  Last A1c reviewed-   Lab Results   Component Value Date    HGBA1C >14.0 (H) 12/01/2023     Most recent fingerstick glucose reviewed-   Recent Labs "   Lab 12/04/23  1500 12/04/23  1704 12/04/23  1842   POCTGLUCOSE >500* 370* 292*     Current correctional scale  Medium  Maintain anti-hyperglycemic dose as follows-   SSI     Hold Oral hypoglycemics while patient is in the hospital.    Essential hypertension  Chronic, controlled. Latest blood pressure and vitals reviewed-     Temp:  [97.5 °F (36.4 °C)-97.6 °F (36.4 °C)]   Pulse:  [66-81]   Resp:  [17-20]   BP: ()/(53-71)   SpO2:  [95 %-98 %] .   Home meds for hypertension were reviewed and noted below.   Hypertension Medications               amLODIPine (NORVASC) 5 MG tablet Take 1 tablet (5 mg total) by mouth once daily.    furosemide (LASIX) 40 MG tablet Take 1 tablet (40 mg total) by mouth 2 (two) times daily.    lisinopriL 10 MG tablet Take 1 tablet (10 mg total) by mouth once daily.    metoprolol succinate (TOPROL-XL) 100 MG 24 hr tablet Take 1 tablet (100 mg total) by mouth once daily.    nitroGLYCERIN (NITROSTAT) 0.3 MG SL tablet Place 1 tablet (0.3 mg total) under the tongue every 5 (five) minutes as needed for Chest pain.            While in the hospital, will manage blood pressure as follows; Continue home antihypertensive regimen    Will utilize p.r.n. blood pressure medication only if patient's blood pressure greater than 180/110 and she develops symptoms such as worsening chest pain or shortness of breath.    CAD (coronary artery disease)  Patient with known CAD s/p CABG, which is controlled Will continue ASA, Plavix, and Statin and monitor for S/Sx of angina/ACS. Continue to monitor on telemetry.       VTE Risk Mitigation (From admission, onward)      Lovenox                 On 12/04/2023, patient should be placed in hospital observation services under my care in collaboration with Dr. Gricelda Alfaro.           Grace Giron, P  Department of Hospital Medicine  [facility]/Surg

## 2023-12-05 NOTE — CONSULTS
Nutrition-Related Diabetes Education      Time Spent: RD remote    Learners: Patient    Current HbA1c:  Lab Results   Component Value Date    HGBA1C 15.0 (H) 12/04/2023         Is patient aware of their A1c and their goal A1c?       __X_____ yes    Home diabetes medication(s):  Current Outpatient Medications   Medication Instructions    amLODIPine (NORVASC) 5 mg, Oral, Daily    aspirin (ECOTRIN) 81 mg, Oral, Daily,      azelastine (ASTELIN) 137 mcg, Nasal, 2 times daily    clopidogreL (PLAVIX) 75 mg, Oral, Daily    coenzyme Q10 200 mg, Oral, Daily,      EScitalopram oxalate (LEXAPRO) 20 MG tablet TAKE 1/2 TABLET BY MOUTH TWICE DAILY    furosemide (LASIX) 40 mg, Oral, 2 times daily    insulin aspart U-100 (NOVOLOG) 10 Units, Subcutaneous, 3 times daily    insulin detemir U-100 (Levemir) 20 Units, Subcutaneous, Nightly    lisinopriL 10 mg, Oral, Daily    metoprolol succinate (TOPROL-XL) 100 mg, Oral, Daily    mirtazapine (REMERON) 7.5 mg, Oral, Nightly    nitroGLYCERIN (NITROSTAT) 0.3 mg, Sublingual, Every 5 min PRN    ondansetron (ZOFRAN-ODT) 4 mg, Oral, Every 8 hours PRN    simvastatin (ZOCOR) 40 mg, Oral, Nightly           Nutrition Education with handouts:   + Clinical Reference attachments to d/c documents  Diabetic educations    Comments:  Patient is on a diabetic diet with fair intake noted at 50%.  Patient admitted with hyperglycemia sent from doctor's office.  Labs reviewed.  NKFA.  LBM:  12/4/2023.  No weight loss noted or found in medical chart weight history.  RD to continue to monitor po intake, po tolerance and diet questions at follow up visits.  I/O since admit: +480mls.    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    Tobacco abuse    History of alcohol dependence    ICD (implantable  cardioverter-defibrillator) in place    Obesity    Anginal equivalent    PVD (peripheral vascular disease)    Chronic combined systolic and diastolic heart failure    Hyperglycemia    YADI (acute kidney injury)     Past Medical History:   Diagnosis Date    Anxiety     CAD (coronary artery disease)     CHF (congestive heart failure)     Chronic low back pain     Diabetes 2/1/2012    Diabetes mellitus     Hyperkalemia 2/13/2022    Hypertension     Leukocytosis 2/13/2022    Metabolic acidosis with respiratory acidosis 2/13/2022     BMP  Lab Results   Component Value Date     (L) 12/05/2023    K 5.0 12/05/2023     12/05/2023    CO2 24 12/05/2023    BUN 37 (H) 12/05/2023    CREATININE 1.4 12/05/2023    CALCIUM 9.5 12/05/2023    ANIONGAP 10 12/05/2023    EGFRNORACEVR 40 (A) 12/05/2023     Lab Results   Component Value Date    CALCIUM 9.5 12/05/2023    PHOS 3.8 12/05/2023     Glucose   Date Value Ref Range Status   12/05/2023 375 (H) 70 - 110 mg/dL Final                         Follow up: Yes    Please consult as needed.  Thank you,  Manju Dahl, MS, RDN, LDN

## 2023-12-05 NOTE — ASSESSMENT & PLAN NOTE
"Patient is identified as having Combined Systolic and Diastolic heart failure that is Chronic. CHF is currently controlled. Latest ECHO performed and demonstrates- Results for orders placed during the hospital encounter of 02/13/22    Echo    Interpretation Summary  · The estimated ejection fraction is 35%.  · The left ventricle is normal in size with mild concentric hypertrophy and moderately decreased systolic function.  · Grade II left ventricular diastolic dysfunction.  · Moderate left atrial enlargement.  · There is moderate left ventricular global hypokinesis.  · Mild right atrial enlargement.  · Mild mitral regurgitation.  · Mild tricuspid regurgitation.  . Continue Beta Blocker ACE/ARB and monitor clinical status closely. Monitor on telemetry. Patient is off CHF pathway.  Monitor strict Is&Os and daily weights.  Cardiology is not consulted. Continue to stress to patient importance of self efficacy and  on diet for CHF. Last BNP reviewed- and noted below No results for input(s): "BNP", "BNPTRIAGEBLO" in the last 168 hours..          "

## 2023-12-05 NOTE — ASSESSMENT & PLAN NOTE
Chronic.  Continue chronic aspirin   Medication: Amlodipine  Last office visit date: 11/7/2023  Next appointment scheduled?: Yes   Number of refills given: 4

## 2023-12-05 NOTE — PLAN OF CARE
12/05/23 1015   MUHAMMAD Message   Medicare Outpatient and Observation Notification regarding financial responsibility Explained to patient/caregiver;Signed/date by patient/caregiver   Date MUHAMMAD was signed 12/05/23   Time MUHAMMAD was signed 1017

## 2023-12-05 NOTE — CARE UPDATE
12/05/23 0720   Patient Assessment/Suction   Level of Consciousness (AVPU) responds to voice   Respiratory Effort Normal;Unlabored   Expansion/Accessory Muscles/Retractions no use of accessory muscles   Rhythm/Pattern, Respiratory unlabored   PRE-TX-O2   Device (Oxygen Therapy) room air   SpO2 98 %   Pulse Oximetry Type Intermittent   $ Pulse Oximetry - Multiple Charge Pulse Oximetry - Multiple   Pulse 87   Resp 16   Positioning HOB elevated 30 degrees   Education   $ Education 15 min

## 2023-12-05 NOTE — ASSESSMENT & PLAN NOTE
IV fluid hydration  Was given IV insulin while in the emergency department.  Reports compliance with her metformin.  Hemoglobin A1c greater than 14  Diabetes educator   Will likely need to be discharged on SQ insulin  Consider ambulatory referral to Endocrinology  Continue Accu-Cheks q.4 hours with sliding scale insulin coverage

## 2023-12-05 NOTE — ASSESSMENT & PLAN NOTE
Patient with known CAD s/p CABG, which is controlled Will continue ASA, Plavix, and Statin and monitor for S/Sx of angina/ACS. Continue to monitor on telemetry.

## 2023-12-05 NOTE — ASSESSMENT & PLAN NOTE
Patient with acute kidney injury/acute renal failure likely due to pre-renal azotemia due to dehydration YADI is currently improving. Baseline creatinine  0.9  - Labs reviewed- Renal function/electrolytes with Estimated Creatinine Clearance: 37.6 mL/min (based on SCr of 1.3 mg/dL). according to latest data. Monitor urine output and serial BMP and adjust therapy as needed. Avoid nephrotoxins and renally dose meds for GFR listed above.

## 2023-12-05 NOTE — ASSESSMENT & PLAN NOTE
Patient's FSGs are uncontrolled due to hyperglycemia on current medication regimen.  Last A1c reviewed-   Lab Results   Component Value Date    HGBA1C >14.0 (H) 12/01/2023     Most recent fingerstick glucose reviewed-   Recent Labs   Lab 12/04/23  1500 12/04/23  1704 12/04/23  1842   POCTGLUCOSE >500* 370* 292*     Current correctional scale  Medium  Maintain anti-hyperglycemic dose as follows-   SSI     Hold Oral hypoglycemics while patient is in the hospital.

## 2023-12-05 NOTE — PLAN OF CARE
Problem: Adult Inpatient Plan of Care  Goal: Plan of Care Review  Outcome: Ongoing, Progressing  Goal: Patient-Specific Goal (Individualized)  Outcome: Ongoing, Progressing  Goal: Absence of Hospital-Acquired Illness or Injury  Outcome: Ongoing, Progressing  Goal: Optimal Comfort and Wellbeing  Outcome: Ongoing, Progressing  Goal: Readiness for Transition of Care  Outcome: Ongoing, Progressing     Problem: Diabetes Comorbidity  Goal: Blood Glucose Level Within Targeted Range  Outcome: Ongoing, Progressing     Problem: Oral Intake Inadequate (Acute Kidney Injury/Impairment)  Goal: Optimal Nutrition Intake  Outcome: Ongoing, Progressing     Problem: Renal Function Impairment (Acute Kidney Injury/Impairment)  Goal: Effective Renal Function  Outcome: Ongoing, Progressing   Plan of care reviewed with patient,verbalized understanding.  IV fluids infusing as per orders. SR  on telemetry. HS , covered with ss & scheduled insulin as per orders. Ambulated to restroom , tolerated well.  Remains free from falls, injury. Instructed to call for assistance as needed ,verbalized understanding. Bed in low & locked position. Call light in reach, bed alarm on .   Clear bilaterally, pupils equal, round and reactive to light.

## 2023-12-05 NOTE — SUBJECTIVE & OBJECTIVE
Past Medical History:   Diagnosis Date    Anxiety     CAD (coronary artery disease)     CHF (congestive heart failure)     Chronic low back pain     Diabetes 2/1/2012    Diabetes mellitus     Hyperkalemia 2/13/2022    Hypertension     Leukocytosis 2/13/2022    Metabolic acidosis with respiratory acidosis 2/13/2022       Past Surgical History:   Procedure Laterality Date    CARDIAC DEFIBRILLATOR PLACEMENT  2007    CORONARY ARTERY BYPASS GRAFT  2007    HYSTERECTOMY         Review of patient's allergies indicates:  No Known Allergies    No current facility-administered medications on file prior to encounter.     Current Outpatient Medications on File Prior to Encounter   Medication Sig    amLODIPine (NORVASC) 5 MG tablet Take 1 tablet (5 mg total) by mouth once daily.    aspirin (ECOTRIN) 81 MG EC tablet Take 81 mg by mouth once daily.    azelastine (ASTELIN) 137 mcg (0.1 %) nasal spray 1 spray (137 mcg total) by Nasal route 2 (two) times daily. for 7 days    clopidogreL (PLAVIX) 75 mg tablet Take 1 tablet (75 mg total) by mouth once daily.    coenzyme Q10 200 mg capsule Take 200 mg by mouth once daily.    EScitalopram oxalate (LEXAPRO) 20 MG tablet TAKE 1/2 TABLET BY MOUTH TWICE DAILY    furosemide (LASIX) 40 MG tablet Take 1 tablet (40 mg total) by mouth 2 (two) times daily.    lisinopriL 10 MG tablet Take 1 tablet (10 mg total) by mouth once daily.    metFORMIN (GLUCOPHAGE) 500 MG tablet TAKE 1 TABLET(500 MG) BY MOUTH TWICE DAILY (Patient taking differently: Take 500 mg by mouth 2 (two) times daily with meals.)    metoprolol succinate (TOPROL-XL) 100 MG 24 hr tablet Take 1 tablet (100 mg total) by mouth once daily.    mirtazapine (REMERON) 7.5 MG Tab Take 1 tablet (7.5 mg total) by mouth every evening.    nitroGLYCERIN (NITROSTAT) 0.3 MG SL tablet Place 1 tablet (0.3 mg total) under the tongue every 5 (five) minutes as needed for Chest pain.    ondansetron (ZOFRAN-ODT) 4 MG TbDL Take 1 tablet (4 mg total) by mouth  every 8 (eight) hours as needed (nauseau).    simvastatin (ZOCOR) 40 MG tablet Take 1 tablet (40 mg total) by mouth every evening.     Family History       Problem Relation (Age of Onset)    Cerebral aneurysm Brother    Diabetes Mother    Heart disease Brother    Mental illness Brother          Tobacco Use    Smoking status: Former     Current packs/day: 0.00     Types: Cigarettes     Quit date:      Years since quittin.9    Smokeless tobacco: Never   Substance and Sexual Activity    Alcohol use: No    Drug use: Not on file    Sexual activity: Not on file     Review of Systems   Constitutional:  Positive for fatigue. Negative for appetite change, chills and fever.   Respiratory:  Negative for cough, shortness of breath and wheezing.    Cardiovascular:  Negative for chest pain and palpitations.   Gastrointestinal:  Negative for abdominal pain, diarrhea, nausea and vomiting.   Endocrine: Positive for polydipsia. Negative for polyuria.   Neurological:  Negative for weakness.   Psychiatric/Behavioral:  Negative for agitation and confusion.    All other systems reviewed and are negative.    Objective:     Vital Signs (Most Recent):  Temp: 97.5 °F (36.4 °C) (23 1430)  Pulse: 69 (23 1830)  Resp: 18 (23 1830)  BP: (!) 103/53 (23 183)  SpO2: 96 % (23) Vital Signs (24h Range):  Temp:  [97.5 °F (36.4 °C)-97.6 °F (36.4 °C)] 97.5 °F (36.4 °C)  Pulse:  [66-81] 69  Resp:  [17-20] 18  SpO2:  [95 %-98 %] 96 %  BP: ()/(53-71) 103/53     Weight: 79.8 kg (176 lb)  Body mass index is 34.37 kg/m².     Physical Exam  Constitutional:       General: She is not in acute distress.     Appearance: She is well-developed. She is not ill-appearing.   HENT:      Head: Normocephalic and atraumatic.   Eyes:      Conjunctiva/sclera: Conjunctivae normal.      Pupils: Pupils are equal, round, and reactive to light.   Cardiovascular:      Rate and Rhythm: Normal rate and regular rhythm.      Heart  "sounds: Normal heart sounds.      Comments: Cardiac device left chest   Pulmonary:      Effort: Pulmonary effort is normal. No respiratory distress.      Breath sounds: Normal breath sounds.   Abdominal:      General: Bowel sounds are normal. There is no distension.      Palpations: Abdomen is soft.      Tenderness: There is no abdominal tenderness.   Musculoskeletal:         General: Normal range of motion.      Cervical back: Normal range of motion and neck supple.      Right lower leg: No edema.      Left lower leg: No edema.   Skin:     General: Skin is warm and dry.   Neurological:      General: No focal deficit present.      Mental Status: She is alert and oriented to person, place, and time.   Psychiatric:         Mood and Affect: Mood normal.         Behavior: Behavior normal.         Thought Content: Thought content normal.         Judgment: Judgment normal.              CRANIAL NERVES     CN III, IV, VI   Pupils are equal, round, and reactive to light.       Significant Labs: All pertinent labs within the past 24 hours have been reviewed.  A1C:   Recent Labs   Lab 12/01/23  1416   HGBA1C >14.0*     CBC:   Recent Labs   Lab 12/04/23  1519   WBC 13.76*   HGB 13.5   HCT 41.1        CMP:   Recent Labs   Lab 12/04/23  1519 12/04/23  1845   * 130*   K 5.4* 4.4   CL 89* 96   CO2 20* 25   * 269*   BUN 44* 38*   CREATININE 1.6* 1.3   CALCIUM 10.3 9.7   PROT 8.0  --    ALBUMIN 3.8  --    BILITOT 0.3  --    ALKPHOS 55  --    AST 17  --    ALT 23  --    ANIONGAP 16 9     TSH: No results for input(s): "TSH" in the last 4320 hours.  Urine Studies:   Recent Labs   Lab 12/04/23  1613   COLORU Colorless*   APPEARANCEUA Clear   PHUR 6.0   SPECGRAV 1.015   PROTEINUA Negative   GLUCUA 4+*   KETONESU Negative   BILIRUBINUA Negative   OCCULTUA Negative   NITRITE Negative   UROBILINOGEN Negative   LEUKOCYTESUR Negative   RBCUA 0   WBCUA 1   BACTERIA Rare   SQUAMEPITHEL 0       Significant Imaging: I have " reviewed all pertinent imaging results/findings within the past 24 hours.  Chest x-ray with no acute findings

## 2023-12-05 NOTE — NURSING
Nurses Note -- 4 Eyes      12/5/2023   4:45 AM      Skin assessed during: Admit      [x] No Altered Skin Integrity Present    []Prevention Measures Documented      [] Yes- Altered Skin Integrity Present or Discovered   [] LDA Added if Not in Epic (Describe Wound)   [] New Altered Skin Integrity was Present on Admit and Documented in LDA   [] Wound Image Taken    Wound Care Consulted? No    Attending Nurse:  Heather Ferro LPN    Second RN/Staff Member: Tha Zamudio RN

## 2023-12-05 NOTE — HPI
Celine Steen is a 68-year-old female with past medical history significant for type 2 diabetes, hypertension, CHF, CAD, and chronic back pain who presented to the emergency department at the direction of her primary care physician for further assessment of hyperglycemia.  She had routine labs at her doctor's office a few days ago with a glucose of 470 and potassium of 5.7; however, her doctor's office was unable to get in touch with her until today.  Labs today in the emergency department with similar findings with glucose 492 and potassium 5.4.  Patient states she takes metformin at home but has never been on insulin.  Reports polydipsia and general fatigue but no other symptoms.  She will be placed in observation under the service of hospital Medicine for continued medical management.

## 2023-12-05 NOTE — DISCHARGE INSTRUCTIONS
**MODERATE CORRECTION DOSE**    Blood Glucose  mg/dL                  Pre-meal                Bedtime  151-200                2 units                    1 unit  201-250                4 units                    2 units    251-300                6 units                    3 units    301-350                8 units                    4 units   >350                     10 units                  5 units  **CALL MD for BG >350**

## 2023-12-05 NOTE — PLAN OF CARE
[place] [facility] - Med/Surg  Initial Discharge Assessment       Primary Care Provider: Natan Marie MD    Admission Diagnosis: Hyperglycemia [R73.9]    Admission Date: 12/4/2023  Expected Discharge Date: 12/6/2023    Transition of Care Barriers: None    Payor: HUMANA MANAGED MEDICARE / Plan: HU[employer]NA SNP HMO PPO SPECIAL NEEDS / Product Type: Medicare Advantage /     Extended Emergency Contact Information  Primary Emergency Contact: CelsaLoi  Address: [address] [place] [address] [place]  Mobile Phone: [phone]  Relation: Spouse    Discharge Plan A: Home, Home with family  Discharge Plan B: Home Health      [id number] - [place] - [address]  [address]  [place] [address]  Phone: [phone] Fax: [phone]    SW met with patient at bedside to complete discharge planning assessment.  Patient alert and oriented xs 4.  Patient verified all demographic information on facesheet is correct.  Patient verified PCP is Dr. Marie.  Patient verified primary health insurance is [employer].  Patient with NO home health but has listed DME.  Patient has home oxygen with portable tank 3L (does not remember services agency).  Patient with NO POA or Living Will.  Patient not on dialysis or medication coumadin.  Patient with no 30 day admission.  Patient with no financial issues at this time.  Patient family will provide transportation upon discharge from facility.  Patient independent with ADLs, live with spouse, drives self.      Initial Assessment (most recent)       Adult Discharge Assessment - 12/05/23 1015          Discharge Assessment    Assessment Type Discharge Planning Assessment     Confirmed/corrected address, phone number and insurance Yes     Confirmed Demographics Correct on Facesheet     Source of Information patient     Does patient/caregiver understand observation status Yes      Communicated CRISTHIAN with patient/caregiver Yes     People in Home spouse     Facility Arrived From: home     Do you expect to return to your current living situation? Yes     Do you have help at home or someone to help you manage your care at home? Yes     Who are your caregiver(s) and their phone number(s)? spouse     Prior to hospitilization cognitive status: Alert/Oriented     Current cognitive status: Alert/Oriented     Walking or Climbing Stairs ambulation difficulty, requires equipment;stair climbing difficulty, requires equipment     Equipment Currently Used at Home cane, straight;oxygen     Readmission within 30 days? No     Patient currently being followed by outpatient case management? No     Do you currently have service(s) that help you manage your care at home? No     Do you take prescription medications? Yes     Do you have prescription coverage? Yes     Do you have any problems affording any of your prescribed medications? No     Is the patient taking medications as prescribed? yes     Who is going to help you get home at discharge? spouse     How do you get to doctors appointments? car, drives self     Are you on dialysis? No     Do you take coumadin? No     DME Needed Upon Discharge  none     Discharge Plan discussed with: Patient     Transition of Care Barriers None     Discharge Plan A Home;Home with family     Discharge Plan B Home Health

## 2023-12-05 NOTE — NURSING
Awake alert and oriented x4. Follows simple commands. 20g to rt fore arm with site free of s/s of infiltration. VSS, 99% RA. Questions answered and encouraged. Reviewed room/unit assignment.

## 2023-12-05 NOTE — PLAN OF CARE
Patient cleared for discharge from case management standpoint.    SW scheduled hospital follow ups and placed on AVS.       12/05/23 1532   Final Note   Assessment Type Final Discharge Note   Anticipated Discharge Disposition Home   What phone number can be called within the next 1-3 days to see how you are doing after discharge? [phone]   Hospital Resources/Appts/Education Provided Provided patient/caregiver with written discharge plan information;Provided education on problems/symptoms using teachback;Appointments scheduled and added to AVS   Post-Acute Status   Post-Acute Authorization Other   Other Status No Post-Acute Service Needs   Discharge Delays None known at this time        Telephone Encounter by Lorrie Lundy NCMA at 04/28/18 12:51 PM     Author:  Lorrie Lundy NCMA Service:  (none) Author Type:  Medical Assistant     Filed:  04/28/18 12:51 PM Encounter Date:  4/28/2018 Status:  Signed     :  Lorrie Lundy NCMA (Medical Assistant)            To [facility][JW1.1M]  Last Visit[JW1.1T]  3/22/2018[JW1.1M]    VICTOZA 18 MG/3ML SOPN 6 mL 5 12/7/2017  No   Sig: ADMINISTER 1.8 MG UNDER THE SKIN DAILY[JW1.1C]          Please advise.[JW1.1M]      Revision History        User Key Date/Time User Provider Type Action    > JW1.1 04/28/18 12:51 PM Lorrie Lundy NCMA Medical Assistant Sign    C - Copied, M - Manual, T - Template

## 2023-12-05 NOTE — ASSESSMENT & PLAN NOTE
Chronic, controlled. Latest blood pressure and vitals reviewed-     Temp:  [97.5 °F (36.4 °C)-97.6 °F (36.4 °C)]   Pulse:  [66-81]   Resp:  [17-20]   BP: ()/(53-71)   SpO2:  [95 %-98 %] .   Home meds for hypertension were reviewed and noted below.   Hypertension Medications               amLODIPine (NORVASC) 5 MG tablet Take 1 tablet (5 mg total) by mouth once daily.    furosemide (LASIX) 40 MG tablet Take 1 tablet (40 mg total) by mouth 2 (two) times daily.    lisinopriL 10 MG tablet Take 1 tablet (10 mg total) by mouth once daily.    metoprolol succinate (TOPROL-XL) 100 MG 24 hr tablet Take 1 tablet (100 mg total) by mouth once daily.    nitroGLYCERIN (NITROSTAT) 0.3 MG SL tablet Place 1 tablet (0.3 mg total) under the tongue every 5 (five) minutes as needed for Chest pain.            While in the hospital, will manage blood pressure as follows; Continue home antihypertensive regimen    Will utilize p.r.n. blood pressure medication only if patient's blood pressure greater than 180/110 and she develops symptoms such as worsening chest pain or shortness of breath.

## 2023-12-06 ENCOUNTER — PATIENT OUTREACH (OUTPATIENT)
Dept: ADMINISTRATIVE | Facility: CLINIC | Age: 68
End: 2023-12-06
Payer: MEDICARE

## 2023-12-06 ENCOUNTER — CLINICAL SUPPORT (OUTPATIENT)
Dept: FAMILY MEDICINE | Facility: CLINIC | Age: 68
End: 2023-12-06
Payer: MEDICARE

## 2023-12-06 DIAGNOSIS — R73.9 HYPERGLYCEMIA: Primary | ICD-10-CM

## 2023-12-06 RX ORDER — PEN NEEDLE, DIABETIC 30 GX3/16"
1 NEEDLE, DISPOSABLE MISCELLANEOUS 4 TIMES DAILY
Qty: 100 EACH | Refills: 3 | Status: CANCELLED | OUTPATIENT
Start: 2023-12-06

## 2023-12-06 NOTE — Clinical Note
Patient needs clarification on insulin. She was given a s/s on page 11 of AVS that contradicts order for Novolog. Has HFU w/you 12/15/23. Also needs pen needles pended for your approval.

## 2023-12-06 NOTE — PROGRESS NOTES
Patient presents to office for education on use of insulin pens. Provided patient with written material to take home for review & to reinforce teaching. Patient brought in AVS from hospital encounter 12/04/23. Noted page 11 of AVS reflects use of s/s which had been highlighted for patient but does not agree w/insulin orders. Will seek clarification from PCP. Pen needles also were not ordered for patient but the pharmacy gave her 5 needles in the meantime. Will pend order for approval.   Patient taught how to set up pen attaching insulin needle. Patient immediately states she was not removing cap shielding needle as the insulin pen used in the hospital was retracted in the pen & once pressed would provide one time injection. Educated patient on setting dial to ordered dose of insulin to be administered, injection site location observing rotating sites, administration & storage of insulin. Patient able to provide correct return demonstration w/shaky hands admitting she was nervous. States she is glad nurse was able to provide the education & reassurance. Appeared more confident in leaving office. Advised to reach out to us with any questions or concerns. Verbalized understanding.

## 2023-12-06 NOTE — PROGRESS NOTES
C3 nurse spoke with Celine Steen for a TCC post hospital discharge follow up call. The patient has a scheduled HOSFU appointment with Natan Marie MD on 12/15/23 @ 7832.

## 2023-12-06 NOTE — NURSING
Pt educated on insulin pens and sliding scale. Prescriptions to be ready by 7 p.m. IV and tele removed. Orders reviewed. Pt ride here. Pt escorted to car viaw/c. Pt d/c home

## 2023-12-07 DIAGNOSIS — E11.59 TYPE 2 DIABETES MELLITUS WITH OTHER CIRCULATORY COMPLICATION, WITHOUT LONG-TERM CURRENT USE OF INSULIN: Primary | ICD-10-CM

## 2023-12-07 NOTE — DISCHARGE SUMMARY
[facility] Medicine  Discharge Summary      Patient Name: Celine Steen  MRN: [id number]  STACY: [id number]  Patient Class: OP- Observation  Admission Date: 12/4/2023  Hospital Length of Stay: 0 days  Discharge Date and Time: 12/5/2023  7:10 PM  Attending Physician: No att. providers found   Discharging Provider: Socrates Escamilla NP  Primary Care Provider: Natan Marie MD    Primary Care Team: Networked reference to record PCT     HPI:   Celine Steen is a 68-year-old female with past medical history significant for type 2 diabetes, hypertension, CHF, CAD, and chronic back pain who presented to the emergency department at the direction of her primary care physician for further assessment of hyperglycemia.  She had routine labs at her doctor's office a few days ago with a glucose of 470 and potassium of 5.7; however, her doctor's office was unable to get in touch with her until today.  Labs today in the emergency department with similar findings with glucose 492 and potassium 5.4.  Patient states she takes metformin at home but has never been on insulin.  Reports polydipsia and general fatigue but no other symptoms.  She will be placed in observation under the service of hospital Medicine for continued medical management.        * No surgery found *      Hospital Course:   Patient monitored closely during observation stay. She was initiated on schedule short acting and long acting insulin. Hgb A1c > 14. Blood sugars improved. Diabetic educator consulted. Staff educated patient on use of glucometer and sliding scale. She is medically stable for OK     Goals of Care Treatment Preferences:  Code Status: Full Code      Consults:   Consults (From admission, onward)          Status Ordering Provider     Inpatient consult to Registered Dietitian/Nutritionist  Once        Provider:  (Not yet assigned)    SOCRATES Echavarria     Case Management/  Once       "  Provider:  (Not yet assigned)    Completed MONSERRAT STONE            No new Assessment & Plan notes have been filed under this hospital service since the last note was generated.  Service: Hospital Medicine    Final Active Diagnoses:    Diagnosis Date Noted POA    PRINCIPAL PROBLEM:  Hyperglycemia [R73.9] 12/04/2023 Yes    YADI (acute kidney injury) [N17.9] 12/04/2023 Yes    Chronic combined systolic and diastolic heart failure [I50.42] 04/06/2023 Yes     Chronic    PVD (peripheral vascular disease) [I73.9] 02/17/2023 Yes    ICD (implantable cardioverter-defibrillator) in place [Z95.810] 08/11/2016 Yes     Chronic    Bipolar 1 disorder [F31.9] 12/11/2012 Yes     Chronic    Type 2 diabetes mellitus with circulatory disorder [E11.59] 02/01/2012 Yes    CAD (coronary artery disease) [I25.10]  Yes     Chronic    Essential hypertension [I10]  Yes     Chronic      Problems Resolved During this Admission:       Discharged Condition: stable    Disposition: Home or Self Care    Follow Up:   Follow-up Information       Natan Marie MD Follow up on 12/15/2023.    Specialty: Family Medicine  Why: at 11:15am  Contact information:  [address] [address]  [phone]               Rahul Suero MD Follow up on 12/8/2023.    Specialties: Interventional Cardiology, Cardiovascular Disease, Cardiology  Why: at 1:00pm for hospital follow up  Contact information:  [address] [place] [address]  [facility]  [facility] [address]  [phone]               [place] - Endocrinology, Diabetes & Metabolism Follow up.    Specialty: Endocrinology  Why: office to contact patient to schedule hospital follow up  Contact information:  [address] [facility] [address] [phone]                         Patient Instructions:      DIABETIC INSULIN SUPPLIES FOR HOME USE     Order Specific Question Answer Comments   Height: 5' 2" (1.575 m)    Weight: 85.2 kg (187 lb 13.3 oz)    Does patient " have medical equipment at home? none    Length of need (1-99 months): 99    Is the Patient insulin dependent? Yes    How many times each day does your Patient test his or her glucose level? QID    Do you follow the Patient at least every 6 months for Management of Diabetes? Yes    Home blood glucose monitor? Yes    [facility] : Select 1 1    Lancing device? Yes    Blood glucose strips? Yes    [facility] : How many test strips needed? 300    Lancets? Yes    Control solution? Yes    Control Solution CPT Code: [facility]     Alcohol wipes? Yes    Betadine/iodine wipes? No    [facility] : How much control solution is needed? 1 bottle      Ambulatory referral/consult to Endocrinology   Standing Status: Future   Referral Priority: Routine Referral Type: Consultation   Requested Specialty: Endocrinology   Number of Visits Requested: 1     Ambulatory referral/consult to Diabetes Education   Standing Status: Future   Referral Priority: Routine Referral Type: Consultation   Referral Reason: Specialty Services Required   Requested Specialty: Diabetes   Number of Visits Requested: 1 Expiration Date: 12/05/24     Diet diabetic     Notify your health care provider if you experience any of the following:  persistent dizziness, light-headedness, or visual disturbances     Notify your health care provider if you experience any of the following:  persistent nausea and vomiting or diarrhea     Activity as tolerated       Significant Diagnostic Studies: N/A    Pending Diagnostic Studies:       None           Medications:  Reconciled Home Medications:      Medication List        START taking these medications      blood sugar diagnostic Strp  Commonly known as: FREESTYLE LITE STRIPS  1 each by Misc.(Non-Drug; Combo Route) route 4 (four) times daily.     blood-glucose meter kit  Use as instructed     insulin aspart U-100 100 unit/mL (3 mL) Inpn pen  Commonly known as: NovoLOG  Inject 5 Units into the skin 3 (three) times daily.      insulin detemir U-100 (Levemir) 100 unit/mL (3 mL) Inpn pen  Inject 20 Units into the skin every evening.     lancets Misc  Commonly known as: LANCETS,THIN  1 each by Misc.(Non-Drug; Combo Route) route 4 (four) times daily.            CHANGE how you take these medications      mirtazapine 7.5 MG Tab  Commonly known as: REMERON  Take 1 tablet (7.5 mg total) by mouth every evening.  What changed:   when to take this  reasons to take this            CONTINUE taking these medications      amLODIPine 5 MG tablet  Commonly known as: NORVASC  Take 1 tablet (5 mg total) by mouth once daily.     aspirin 81 MG EC tablet  Commonly known as: ECOTRIN  Take 81 mg by mouth once daily.     azelastine 137 mcg (0.1 %) nasal spray  Commonly known as: ASTELIN  1 spray (137 mcg total) by Nasal route 2 (two) times daily. for 7 days     clopidogreL 75 mg tablet  Commonly known as: PLAVIX  Take 1 tablet (75 mg total) by mouth once daily.     coenzyme Q10 200 mg capsule  Take 200 mg by mouth 2 (two) times daily.     EScitalopram oxalate 20 MG tablet  Commonly known as: LEXAPRO  TAKE 1/2 TABLET BY MOUTH TWICE DAILY     furosemide 40 MG tablet  Commonly known as: LASIX  Take 1 tablet (40 mg total) by mouth 2 (two) times daily.     lisinopriL 10 MG tablet  Take 1 tablet (10 mg total) by mouth once daily.     metoprolol succinate 100 MG 24 hr tablet  Commonly known as: TOPROL-XL  Take 1 tablet (100 mg total) by mouth once daily.     nitroGLYCERIN 0.3 MG SL tablet  Commonly known as: NITROSTAT  Place 1 tablet (0.3 mg total) under the tongue every 5 (five) minutes as needed for Chest pain.     ondansetron 4 MG Tbdl  Commonly known as: ZOFRAN-ODT  Take 1 tablet (4 mg total) by mouth every 8 (eight) hours as needed (nauseau).     simvastatin 40 MG tablet  Commonly known as: ZOCOR  Take 1 tablet (40 mg total) by mouth every evening.            STOP taking these medications      metFORMIN 500 MG tablet  Commonly known as: GLUCOPHAGE               Indwelling Lines/Drains at time of discharge:   Lines/Drains/Airways       None                   Time spent on the discharge of patient: 50 minutes         Kisha Escamilla NP  Department of Hospital Medicine  [facility]

## 2023-12-07 NOTE — HOSPITAL COURSE
Patient monitored closely during observation stay. She was initiated on schedule short acting and long acting insulin. Hgb A1c > 14. Blood sugars improved. Diabetic educator consulted. Staff educated patient on use of glucometer and sliding scale. She is medically stable for DC

## 2023-12-07 NOTE — TELEPHONE ENCOUNTER
----- Message from Pauly Templeton, Patient Care Assistant sent at 12/7/2023  3:30 PM CST -----  Contact: self  Pt is calling to speak a nurse in regards to her needles for her insulin [phone]  Thanks ( pt only  7 needles for the pharm) Please call back asap [phone]  thanks

## 2023-12-07 NOTE — TELEPHONE ENCOUNTER
No care due was identified.  [facility] Embedded Care Due Messages. Reference number: [id number].   12/07/2023 3:44:20 PM CST

## 2023-12-08 ENCOUNTER — OFFICE VISIT (OUTPATIENT)
Dept: CARDIOLOGY | Facility: CLINIC | Age: 68
End: 2023-12-08
Payer: MEDICARE

## 2023-12-08 VITALS
OXYGEN SATURATION: 100 % | DIASTOLIC BLOOD PRESSURE: 62 MMHG | HEART RATE: 73 BPM | BODY MASS INDEX: 31.82 KG/M2 | HEIGHT: 62 IN | SYSTOLIC BLOOD PRESSURE: 122 MMHG | WEIGHT: 172.94 LBS

## 2023-12-08 DIAGNOSIS — E11.59 TYPE 2 DIABETES MELLITUS WITH OTHER CIRCULATORY COMPLICATION, WITHOUT LONG-TERM CURRENT USE OF INSULIN: ICD-10-CM

## 2023-12-08 DIAGNOSIS — I10 ESSENTIAL HYPERTENSION: Chronic | ICD-10-CM

## 2023-12-08 DIAGNOSIS — I50.42 CHRONIC COMBINED SYSTOLIC AND DIASTOLIC HEART FAILURE: Chronic | ICD-10-CM

## 2023-12-08 DIAGNOSIS — Z95.810 ICD (IMPLANTABLE CARDIOVERTER-DEFIBRILLATOR) IN PLACE: Chronic | ICD-10-CM

## 2023-12-08 DIAGNOSIS — E78.00 HYPERCHOLESTEROLEMIA: ICD-10-CM

## 2023-12-08 DIAGNOSIS — I25.10 CORONARY ARTERY DISEASE INVOLVING NATIVE CORONARY ARTERY OF NATIVE HEART WITHOUT ANGINA PECTORIS: Primary | Chronic | ICD-10-CM

## 2023-12-08 PROCEDURE — 3046F PR MOST RECENT HEMOGLOBIN A1C LEVEL > 9.0%: ICD-10-PCS | Mod: HCNC,CPTII,S$GLB, | Performed by: INTERNAL MEDICINE

## 2023-12-08 PROCEDURE — 99214 PR OFFICE/OUTPT VISIT, EST, LEVL IV, 30-39 MIN: ICD-10-PCS | Mod: HCNC,S$GLB,, | Performed by: INTERNAL MEDICINE

## 2023-12-08 PROCEDURE — 3288F FALL RISK ASSESSMENT DOCD: CPT | Mod: HCNC,CPTII,S$GLB, | Performed by: INTERNAL MEDICINE

## 2023-12-08 PROCEDURE — 3074F SYST BP LT 130 MM HG: CPT | Mod: HCNC,CPTII,S$GLB, | Performed by: INTERNAL MEDICINE

## 2023-12-08 PROCEDURE — 3061F NEG MICROALBUMINURIA REV: CPT | Mod: HCNC,CPTII,S$GLB, | Performed by: INTERNAL MEDICINE

## 2023-12-08 PROCEDURE — 1160F RVW MEDS BY RX/DR IN RCRD: CPT | Mod: HCNC,CPTII,S$GLB, | Performed by: INTERNAL MEDICINE

## 2023-12-08 PROCEDURE — 3078F PR MOST RECENT DIASTOLIC BLOOD PRESSURE < 80 MM HG: ICD-10-PCS | Mod: HCNC,CPTII,S$GLB, | Performed by: INTERNAL MEDICINE

## 2023-12-08 PROCEDURE — 1126F PR PAIN SEVERITY QUANTIFIED, NO PAIN PRESENT: ICD-10-PCS | Mod: HCNC,CPTII,S$GLB, | Performed by: INTERNAL MEDICINE

## 2023-12-08 PROCEDURE — 99999 PR PBB SHADOW E&M-EST. PATIENT-LVL IV: ICD-10-PCS | Mod: PBBFAC,HCNC,, | Performed by: INTERNAL MEDICINE

## 2023-12-08 PROCEDURE — 3008F PR BODY MASS INDEX (BMI) DOCUMENTED: ICD-10-PCS | Mod: HCNC,CPTII,S$GLB, | Performed by: INTERNAL MEDICINE

## 2023-12-08 PROCEDURE — 1126F AMNT PAIN NOTED NONE PRSNT: CPT | Mod: HCNC,CPTII,S$GLB, | Performed by: INTERNAL MEDICINE

## 2023-12-08 PROCEDURE — 3074F PR MOST RECENT SYSTOLIC BLOOD PRESSURE < 130 MM HG: ICD-10-PCS | Mod: HCNC,CPTII,S$GLB, | Performed by: INTERNAL MEDICINE

## 2023-12-08 PROCEDURE — 4010F PR ACE/ARB THEARPY RXD/TAKEN: ICD-10-PCS | Mod: HCNC,CPTII,S$GLB, | Performed by: INTERNAL MEDICINE

## 2023-12-08 PROCEDURE — 4010F ACE/ARB THERAPY RXD/TAKEN: CPT | Mod: HCNC,CPTII,S$GLB, | Performed by: INTERNAL MEDICINE

## 2023-12-08 PROCEDURE — 3078F DIAST BP <80 MM HG: CPT | Mod: HCNC,CPTII,S$GLB, | Performed by: INTERNAL MEDICINE

## 2023-12-08 PROCEDURE — 3066F PR DOCUMENTATION OF TREATMENT FOR NEPHROPATHY: ICD-10-PCS | Mod: HCNC,CPTII,S$GLB, | Performed by: INTERNAL MEDICINE

## 2023-12-08 PROCEDURE — 99214 OFFICE O/P EST MOD 30 MIN: CPT | Mod: HCNC,S$GLB,, | Performed by: INTERNAL MEDICINE

## 2023-12-08 PROCEDURE — 99999 PR PBB SHADOW E&M-EST. PATIENT-LVL IV: CPT | Mod: PBBFAC,HCNC,, | Performed by: INTERNAL MEDICINE

## 2023-12-08 PROCEDURE — 1160F PR REVIEW ALL MEDS BY PRESCRIBER/CLIN PHARMACIST DOCUMENTED: ICD-10-PCS | Mod: HCNC,CPTII,S$GLB, | Performed by: INTERNAL MEDICINE

## 2023-12-08 PROCEDURE — 3066F NEPHROPATHY DOC TX: CPT | Mod: HCNC,CPTII,S$GLB, | Performed by: INTERNAL MEDICINE

## 2023-12-08 PROCEDURE — 3061F PR NEG MICROALBUMINURIA RESULT DOCUMENTED/REVIEW: ICD-10-PCS | Mod: HCNC,CPTII,S$GLB, | Performed by: INTERNAL MEDICINE

## 2023-12-08 PROCEDURE — 3288F PR FALLS RISK ASSESSMENT DOCUMENTED: ICD-10-PCS | Mod: HCNC,CPTII,S$GLB, | Performed by: INTERNAL MEDICINE

## 2023-12-08 PROCEDURE — 1101F PT FALLS ASSESS-DOCD LE1/YR: CPT | Mod: HCNC,CPTII,S$GLB, | Performed by: INTERNAL MEDICINE

## 2023-12-08 PROCEDURE — 3008F BODY MASS INDEX DOCD: CPT | Mod: HCNC,CPTII,S$GLB, | Performed by: INTERNAL MEDICINE

## 2023-12-08 PROCEDURE — 1101F PR PT FALLS ASSESS DOC 0-1 FALLS W/OUT INJ PAST YR: ICD-10-PCS | Mod: HCNC,CPTII,S$GLB, | Performed by: INTERNAL MEDICINE

## 2023-12-08 PROCEDURE — 1159F MED LIST DOCD IN RCRD: CPT | Mod: HCNC,CPTII,S$GLB, | Performed by: INTERNAL MEDICINE

## 2023-12-08 PROCEDURE — 3046F HEMOGLOBIN A1C LEVEL >9.0%: CPT | Mod: HCNC,CPTII,S$GLB, | Performed by: INTERNAL MEDICINE

## 2023-12-08 PROCEDURE — 1159F PR MEDICATION LIST DOCUMENTED IN MEDICAL RECORD: ICD-10-PCS | Mod: HCNC,CPTII,S$GLB, | Performed by: INTERNAL MEDICINE

## 2023-12-08 RX ORDER — PEN NEEDLE, DIABETIC 30 GX3/16"
1 NEEDLE, DISPOSABLE MISCELLANEOUS 3 TIMES DAILY
Qty: 90 EACH | Refills: 3 | Status: SHIPPED | OUTPATIENT
Start: 2023-12-08

## 2023-12-08 NOTE — PROGRESS NOTES
Patient ID:  Celine Steen is a 68 y.o. female who presents for follow-up of Hospital Follow Up      68-year-old female recently admitted to the hospital for uncontrolled diabetes mellitus.  Her blood sugar was 500 hemoglobin A1c 15 she was started on insulin  History of coronary artery disease status post coronary artery bypass in 2008 3 vessels Dr. Matute  Left ventricular dysfunction ejection fraction of 35%  Status post ICD placement a [facility] unit  A fixed anterior apical defect on myocardial perfusion scan consistent with a scar  Hypercholesterolemia treated with simvastatin  Combined systolic and diastolic heart failure on ACE, diuretic, beta-blockers  COVID on 02/22  She has been doing well from cardiovascular standpoint.  Denies any chest pains any shortness of breath        Past Medical History:   Diagnosis Date    Anxiety     CAD (coronary artery disease)     CHF (congestive heart failure)     Chronic low back pain     Diabetes 2/1/2012    Diabetes mellitus     Hyperkalemia 2/13/2022    Hypertension     Leukocytosis 2/13/2022    Metabolic acidosis with respiratory acidosis 2/13/2022        Past Surgical History:   Procedure Laterality Date    CARDIAC DEFIBRILLATOR PLACEMENT  2007    CORONARY ARTERY BYPASS GRAFT  2007    HYSTERECTOMY            Current Outpatient Medications   Medication Instructions    amLODIPine (NORVASC) 5 mg, Oral, Daily    aspirin (ECOTRIN) 81 mg, Oral, Daily,      azelastine (ASTELIN) 137 mcg, Nasal, 2 times daily    blood sugar diagnostic (FREESTYLE LITE STRIPS) Strp 1 each, Misc.(Non-Drug; Combo Route), 4 times daily    blood-glucose meter kit Use as instructed    clopidogreL (PLAVIX) 75 mg, Oral, Daily    coenzyme Q10 200 mg, Oral, 2 times daily,      EScitalopram oxalate (LEXAPRO) 20 MG tablet TAKE 1/2 TABLET BY MOUTH TWICE DAILY    furosemide (LASIX) 40 mg, Oral, 2 times daily    insulin aspart U-100 (NOVOLOG) 5 Units, Subcutaneous, 3 times daily    insulin detemir  "U-100 (Levemir) 20 Units, Subcutaneous, Nightly    lancets (LANCETS,THIN) Misc 1 each, Misc.(Non-Drug; Combo Route), 4 times daily    lisinopriL 10 mg, Oral, Daily    metoprolol succinate (TOPROL-XL) 100 mg, Oral, Daily    mirtazapine (REMERON) 7.5 mg, Oral, Nightly    nitroGLYCERIN (NITROSTAT) 0.3 mg, Sublingual, Every 5 min PRN    ondansetron (ZOFRAN-ODT) 4 mg, Oral, Every 8 hours PRN    pen needle, diabetic (DROPLET PEN NEEDLE) 32 gauge x 5/32" Ndle 1 each, Misc.(Non-Drug; Combo Route), 3 times daily    simvastatin (ZOCOR) 40 mg, Oral, Nightly        Review of patient's allergies indicates:  No Known Allergies     Review of Systems   Constitutional: Negative for fever.   HENT:  Positive for congestion.    Cardiovascular:  Negative for chest pain, dyspnea on exertion and palpitations.   Respiratory:  Negative for cough and shortness of breath.    Allergic/Immunologic: Positive for environmental allergies.        Objective:     Vitals:    12/08/23 1305   BP: 122/62   BP Location: Left arm   Patient Position: Sitting   BP Method: Medium (Manual)   Pulse: 73   SpO2: 100%   Weight: 78.4 kg (172 lb 15.2 oz)   Height: 5' 2" (1.575 m)       Physical Exam  CMP  Sodium   Date Value Ref Range Status   12/05/2023 135 (L) 136 - 145 mmol/L Final     Potassium   Date Value Ref Range Status   12/05/2023 5.0 3.5 - 5.1 mmol/L Final     Chloride   Date Value Ref Range Status   12/05/2023 101 95 - 110 mmol/L Final     CO2   Date Value Ref Range Status   12/05/2023 24 23 - 29 mmol/L Final     Glucose   Date Value Ref Range Status   12/05/2023 375 (H) 70 - 110 mg/dL Final     BUN   Date Value Ref Range Status   12/05/2023 37 (H) 8 - 23 mg/dL Final     Creatinine   Date Value Ref Range Status   12/05/2023 1.4 0.5 - 1.4 mg/dL Final     Calcium   Date Value Ref Range Status   12/05/2023 9.5 8.7 - 10.5 mg/dL Final     Total Protein   Date Value Ref Range Status   12/05/2023 7.5 6.0 - 8.4 g/dL Final     Albumin   Date Value Ref Range " Status   12/05/2023 3.2 (L) 3.5 - 5.2 g/dL Final     Total Bilirubin   Date Value Ref Range Status   12/05/2023 0.2 0.1 - 1.0 mg/dL Final     Comment:     For infants and newborns, interpretation of results should be based  on gestational age, weight and in agreement with clinical  observations.    Premature Infant recommended reference ranges:  Up to 24 hours.............<8.0 mg/dL  Up to 48 hours............<12.0 mg/dL  3-5 days..................<15.0 mg/dL  6-29 days.................<15.0 mg/dL       Alkaline Phosphatase   Date Value Ref Range Status   12/05/2023 59 55 - 135 U/L Final     AST   Date Value Ref Range Status   12/05/2023 23 10 - 40 U/L Final     ALT   Date Value Ref Range Status   12/05/2023 20 10 - 44 U/L Final     Anion Gap   Date Value Ref Range Status   12/05/2023 10 8 - 16 mmol/L Final     eGFR if    Date Value Ref Range Status   02/15/2022 >60 >60 mL/min/1.73 m^2 Final     eGFR if non    Date Value Ref Range Status   02/15/2022 >60 >60 mL/min/1.73 m^2 Final     Comment:     Calculation used to obtain the estimated glomerular filtration  rate (eGFR) is the CKD-EPI equation.         BMP  Lab Results   Component Value Date     (L) 12/05/2023    K 5.0 12/05/2023     12/05/2023    CO2 24 12/05/2023    BUN 37 (H) 12/05/2023    CREATININE 1.4 12/05/2023    CALCIUM 9.5 12/05/2023    ANIONGAP 10 12/05/2023    ESTGFRAFRICA >60 02/15/2022    EGFRNONAA >60 02/15/2022      BNP  @LABRCNTIP(BNP,BNPTRIAGEBLO)@   Lab Results   Component Value Date    CHOL 148 12/01/2023    CHOL 149 05/12/2023    CHOL 134 08/25/2021     Lab Results   Component Value Date    HDL 37 (L) 12/01/2023    HDL 38 (L) 05/12/2023    HDL 32 (L) 08/25/2021     Lab Results   Component Value Date    LDLCALC 85.0 12/01/2023    LDLCALC 60.8 (L) 05/12/2023    LDLCALC 74.8 08/25/2021     Lab Results   Component Value Date    TRIG 130 12/01/2023    TRIG 251 (H) 05/12/2023    TRIG 136 08/25/2021     Lab  Results   Component Value Date    CHOLHDL 25.0 12/01/2023    CHOLHDL 25.5 05/12/2023    CHOLHDL 23.9 08/25/2021      Lab Results   Component Value Date    TSH 0.045 (L) 05/12/2023    FREET4 0.99 05/12/2023     Lab Results   Component Value Date    HGBA1C 15.0 (H) 12/04/2023     Lab Results   Component Value Date    WBC 12.07 12/05/2023    HGB 12.2 12/05/2023    HCT 37.5 12/05/2023    MCV 87 12/05/2023     12/05/2023         Results for orders placed during the hospital encounter of 02/13/22    Echo    Interpretation Summary  · The estimated ejection fraction is 35%.  · The left ventricle is normal in size with mild concentric hypertrophy and moderately decreased systolic function.  · Grade II left ventricular diastolic dysfunction.  · Moderate left atrial enlargement.  · There is moderate left ventricular global hypokinesis.  · Mild right atrial enlargement.  · Mild mitral regurgitation.  · Mild tricuspid regurgitation.     No results found for this or any previous visit.         Assessment:       CAD (coronary artery disease)  Stable no symptoms of angina status post coronary artery bypass    ICD (implantable cardioverter-defibrillator) in place  [facility] unit functioning adequately    Type 2 diabetes mellitus with circulatory disorder  Recently admitted to the hospital on started on insulin.  Her hemoglobin A1c was 15    Hypercholesterolemia  On 40 mg of simvastatin    Essential hypertension  Controlled on lisinopril 10 mg, metoprolol 100 mg Lasix 40 mg amlodipine 5 mg    Chronic combined systolic and diastolic heart failure  On ACE inhibitor, beta-blocker, diuretic.  She appears to be compensated       Plan:           Continue the current medical therapy.  She is to follow-up with the primary care physician and control her diabetes.  She will be seen in the office in 6 months.

## 2023-12-08 NOTE — TELEPHONE ENCOUNTER
Refill Routing Note   Medication(s) are not appropriate for processing by [facility] for the following reason(s):        ED/Hospital Visit since last OV with provider    [facility] action(s):  Defer               Appointments  past 12m or future 3m with PCP    Date Provider   Last Visit   12/4/2023 Natan Marie MD   Next Visit   12/15/2023 Natan Marie MD   ED visits in past 90 days: 1        Note composed:1:46 AM 12/08/2023

## 2023-12-15 ENCOUNTER — OFFICE VISIT (OUTPATIENT)
Dept: FAMILY MEDICINE | Facility: CLINIC | Age: 68
End: 2023-12-15
Payer: MEDICARE

## 2023-12-15 VITALS
RESPIRATION RATE: 18 BRPM | WEIGHT: 171.75 LBS | SYSTOLIC BLOOD PRESSURE: 92 MMHG | BODY MASS INDEX: 31.6 KG/M2 | TEMPERATURE: 98 F | HEART RATE: 70 BPM | DIASTOLIC BLOOD PRESSURE: 60 MMHG | HEIGHT: 62 IN | OXYGEN SATURATION: 99 %

## 2023-12-15 DIAGNOSIS — Z79.4 TYPE 2 DIABETES MELLITUS WITH HYPERGLYCEMIA, WITH LONG-TERM CURRENT USE OF INSULIN: Primary | ICD-10-CM

## 2023-12-15 DIAGNOSIS — E11.59 HYPERTENSION ASSOCIATED WITH TYPE 2 DIABETES MELLITUS: ICD-10-CM

## 2023-12-15 DIAGNOSIS — I15.2 HYPERTENSION ASSOCIATED WITH TYPE 2 DIABETES MELLITUS: ICD-10-CM

## 2023-12-15 DIAGNOSIS — E11.65 TYPE 2 DIABETES MELLITUS WITH HYPERGLYCEMIA, WITH LONG-TERM CURRENT USE OF INSULIN: Primary | ICD-10-CM

## 2023-12-15 DIAGNOSIS — Z09 HOSPITAL DISCHARGE FOLLOW-UP: ICD-10-CM

## 2023-12-15 PROCEDURE — 3061F NEG MICROALBUMINURIA REV: CPT | Mod: HCNC,CPTII,S$GLB, | Performed by: FAMILY MEDICINE

## 2023-12-15 PROCEDURE — 1125F AMNT PAIN NOTED PAIN PRSNT: CPT | Mod: HCNC,CPTII,S$GLB, | Performed by: FAMILY MEDICINE

## 2023-12-15 PROCEDURE — 3061F PR NEG MICROALBUMINURIA RESULT DOCUMENTED/REVIEW: ICD-10-PCS | Mod: HCNC,CPTII,S$GLB, | Performed by: FAMILY MEDICINE

## 2023-12-15 PROCEDURE — 1101F PT FALLS ASSESS-DOCD LE1/YR: CPT | Mod: HCNC,CPTII,S$GLB, | Performed by: FAMILY MEDICINE

## 2023-12-15 PROCEDURE — 3066F NEPHROPATHY DOC TX: CPT | Mod: HCNC,CPTII,S$GLB, | Performed by: FAMILY MEDICINE

## 2023-12-15 PROCEDURE — 3078F DIAST BP <80 MM HG: CPT | Mod: HCNC,CPTII,S$GLB, | Performed by: FAMILY MEDICINE

## 2023-12-15 PROCEDURE — 1159F PR MEDICATION LIST DOCUMENTED IN MEDICAL RECORD: ICD-10-PCS | Mod: HCNC,CPTII,S$GLB, | Performed by: FAMILY MEDICINE

## 2023-12-15 PROCEDURE — 3008F PR BODY MASS INDEX (BMI) DOCUMENTED: ICD-10-PCS | Mod: HCNC,CPTII,S$GLB, | Performed by: FAMILY MEDICINE

## 2023-12-15 PROCEDURE — 1125F PR PAIN SEVERITY QUANTIFIED, PAIN PRESENT: ICD-10-PCS | Mod: HCNC,CPTII,S$GLB, | Performed by: FAMILY MEDICINE

## 2023-12-15 PROCEDURE — 3008F BODY MASS INDEX DOCD: CPT | Mod: HCNC,CPTII,S$GLB, | Performed by: FAMILY MEDICINE

## 2023-12-15 PROCEDURE — 3066F PR DOCUMENTATION OF TREATMENT FOR NEPHROPATHY: ICD-10-PCS | Mod: HCNC,CPTII,S$GLB, | Performed by: FAMILY MEDICINE

## 2023-12-15 PROCEDURE — 1101F PR PT FALLS ASSESS DOC 0-1 FALLS W/OUT INJ PAST YR: ICD-10-PCS | Mod: HCNC,CPTII,S$GLB, | Performed by: FAMILY MEDICINE

## 2023-12-15 PROCEDURE — 4010F PR ACE/ARB THEARPY RXD/TAKEN: ICD-10-PCS | Mod: HCNC,CPTII,S$GLB, | Performed by: FAMILY MEDICINE

## 2023-12-15 PROCEDURE — 99214 OFFICE O/P EST MOD 30 MIN: CPT | Mod: HCNC,S$GLB,, | Performed by: FAMILY MEDICINE

## 2023-12-15 PROCEDURE — 4010F ACE/ARB THERAPY RXD/TAKEN: CPT | Mod: HCNC,CPTII,S$GLB, | Performed by: FAMILY MEDICINE

## 2023-12-15 PROCEDURE — 3074F SYST BP LT 130 MM HG: CPT | Mod: HCNC,CPTII,S$GLB, | Performed by: FAMILY MEDICINE

## 2023-12-15 PROCEDURE — 99214 PR OFFICE/OUTPT VISIT, EST, LEVL IV, 30-39 MIN: ICD-10-PCS | Mod: HCNC,S$GLB,, | Performed by: FAMILY MEDICINE

## 2023-12-15 PROCEDURE — 3046F HEMOGLOBIN A1C LEVEL >9.0%: CPT | Mod: HCNC,CPTII,S$GLB, | Performed by: FAMILY MEDICINE

## 2023-12-15 PROCEDURE — 99999 PR PBB SHADOW E&M-EST. PATIENT-LVL V: CPT | Mod: PBBFAC,HCNC,, | Performed by: FAMILY MEDICINE

## 2023-12-15 PROCEDURE — 3078F PR MOST RECENT DIASTOLIC BLOOD PRESSURE < 80 MM HG: ICD-10-PCS | Mod: HCNC,CPTII,S$GLB, | Performed by: FAMILY MEDICINE

## 2023-12-15 PROCEDURE — 1159F MED LIST DOCD IN RCRD: CPT | Mod: HCNC,CPTII,S$GLB, | Performed by: FAMILY MEDICINE

## 2023-12-15 PROCEDURE — 3288F PR FALLS RISK ASSESSMENT DOCUMENTED: ICD-10-PCS | Mod: HCNC,CPTII,S$GLB, | Performed by: FAMILY MEDICINE

## 2023-12-15 PROCEDURE — 3046F PR MOST RECENT HEMOGLOBIN A1C LEVEL > 9.0%: ICD-10-PCS | Mod: HCNC,CPTII,S$GLB, | Performed by: FAMILY MEDICINE

## 2023-12-15 PROCEDURE — 3288F FALL RISK ASSESSMENT DOCD: CPT | Mod: HCNC,CPTII,S$GLB, | Performed by: FAMILY MEDICINE

## 2023-12-15 PROCEDURE — 99999 PR PBB SHADOW E&M-EST. PATIENT-LVL V: ICD-10-PCS | Mod: PBBFAC,HCNC,, | Performed by: FAMILY MEDICINE

## 2023-12-15 PROCEDURE — 3074F PR MOST RECENT SYSTOLIC BLOOD PRESSURE < 130 MM HG: ICD-10-PCS | Mod: HCNC,CPTII,S$GLB, | Performed by: FAMILY MEDICINE

## 2023-12-15 RX ORDER — METOPROLOL SUCCINATE 50 MG/1
100 TABLET, EXTENDED RELEASE ORAL DAILY
Qty: 180 TABLET | Refills: 3 | Status: SHIPPED | OUTPATIENT
Start: 2023-12-15 | End: 2024-12-14

## 2023-12-15 NOTE — PATIENT INSTRUCTIONS
Dread Berger,     If you are due for any health screening(s) below please notify me so we can arrange them to be ordered and scheduled to maintain your health. Most healthy patients complete it. Don't lose out on improving your health.     All of your core healthy metrics are met.      Breast Cancer Screening    Breast cancer is the second most common cancer in women after skin cancer, and the second leading cause of death from cancer after lung cancer. Mammograms can detect breast cancer early, which significantly increases the chances of curing the cancer.      A screening mammogram is an x-ray image of the breasts used for early breast cancer detection. It can help reduce the number of deaths from breast cancer among women. To get a clear image, the breast is placed between two plastic plates to make it flat. How often a mammogram is needed depends on your age and your breast cancer risk.    Colon Cancer Screening    Of cancers affecting both men and women, colorectal cancer is the third leading cancer killer in the [place]. But it doesnt have to be. Screening can prevent colorectal cancer or find it at an early stage when treatment often leads to a cure.    A colonoscopy is the preferred test for detecting colon cancer. It is needed only once every 10 years if results are negative. While sedated, a flexible, lighted tube with a tiny camera is inserted into the rectum and advanced through the colon to look for cancers. An alternative screening test that is used at home and returned to the lab may also be used. It detects hidden blood in bowel movements which could indicate cancer in the colon. If results are positive, you will need a colonoscopy to determine if the blood is a sign of cancer. This type of follow up (diagnostic) colonoscopy usually requires additional copays as required by your insurance provider. Please contact your PCP if you have any questions.         Diabetic Retinal Eye Exam    Diabetes is  the #1 cause of blindness in the [place] - early detection before signs or symptoms develop can prevent debilitating blindness.    Once-a-year screening is recommended for all diabetic patients. This exam can prevent and treat diabetes complications in the eye before developing symptoms. This can be done with a special camera is used to take photographs of the back of your eye without having to dilate them, or you can see an eye doctor for a full dilated exam.      A1c every 3-6 months, lipid panel every year, microalbumin (urine test) once per year, comprehensive foot exam once per year, dilated eye exam at least once per year, dental exam every 6 months, flu vaccination every year, and pneumonia vaccination(s) as recommended

## 2023-12-15 NOTE — PROGRESS NOTES
Subjective:   Patient ID: Celine Steen is a 68 y.o. female     Chief Complaint:Follow-up      Here for checkup    Follow-up  Pertinent negatives include no abdominal pain or chest pain.     Review of Systems   Respiratory:  Negative for shortness of breath.    Cardiovascular:  Negative for chest pain.   Gastrointestinal:  Negative for abdominal pain.   Genitourinary:  Negative for dysuria.     Past Medical History:   Diagnosis Date    Anxiety     CAD (coronary artery disease)     CHF (congestive heart failure)     Chronic low back pain     Diabetes 2/1/2012    Diabetes mellitus     Hyperkalemia 2/13/2022    Hypertension     Leukocytosis 2/13/2022    Metabolic acidosis with respiratory acidosis 2/13/2022     Past Surgical History:   Procedure Laterality Date    CARDIAC DEFIBRILLATOR PLACEMENT  2007    CORONARY ARTERY BYPASS GRAFT  2007    HYSTERECTOMY       Objective:     Vitals:    12/15/23 1157   BP: 92/60   Pulse:    Resp:    Temp:      Body mass index is 31.41 kg/m².  Physical Exam  Vitals and nursing note reviewed.   Constitutional:       Appearance: She is well-developed.   HENT:      Head: Normocephalic and atraumatic.   Eyes:      General: No scleral icterus.     Conjunctiva/sclera: Conjunctivae normal.   Cardiovascular:      Heart sounds: No murmur heard.  Pulmonary:      Effort: Pulmonary effort is normal. No respiratory distress.   Musculoskeletal:         General: No deformity. Normal range of motion.      Cervical back: Normal range of motion and neck supple.   Skin:     Coloration: Skin is not pale.      Findings: No rash.   Neurological:      Mental Status: She is alert and oriented to person, place, and time.   Psychiatric:         Behavior: Behavior normal.         Thought Content: Thought content normal.         Judgment: Judgment normal.       Assessment:     1. Type 2 diabetes mellitus with hyperglycemia, with long-term current use of insulin    2. Hypertension associated with type 2  diabetes mellitus    3. Hospital discharge follow-up      Plan:   Type 2 diabetes mellitus with hyperglycemia, with long-term current use of insulin  -     POCT Glucose, Hand-Held Device  -     insulin detemir U-100, Levemir, 100 unit/mL (3 mL) SubQ InPn pen; Inject 30 Units into the skin every evening.  Dispense: 9 mL; Refill: 11  -     Hemoglobin A1C; Future; Expected date: 01/15/2024  -     Comprehensive Metabolic Panel; Future; Expected date: 01/15/2024    Hypertension associated with type 2 diabetes mellitus  -     metoprolol succinate (TOPROL-XL) 50 MG 24 hr tablet; Take 2 tablets (100 mg total) by mouth once daily.  Dispense: 180 tablet; Refill: 3    Hospital discharge follow-up  Hospital follow up for hyperglycemia. Soft pressure today concerning for return visit to ED. On recheck pressure had rebounded and asymptomatic. Stopped amlodipine and reduced dose metoprolol to 50mg daily. Blood sugar improving now mostly numbers <200. Will discontinue scheduled aspart 5 mg TID due to concern for hypoglycemia and increase dose of levemir nightly form 20 units to 30 units. Counseled on healthy diet.          Total time spent of Greater than 30 minutes minutes on the day of the visit.This includes face to face time and preparing to see the patient, obtaining and reviewing separately obtained history, documenting clinical information in the electronic or other health record, independently interpreting results and communicating results to the patient/family/caregiver, or care coordinator.    Established patient with me has been instructed that must see me at least 1 time yearly (every 365 days) for refills of medications. Seeing other providers in this clinic is fine but expectation is to see me yearly.    Natan Marie MD  12/15/2023    Portions of this note have been dictated with NAHID Slade

## 2024-01-12 ENCOUNTER — HOSPITAL ENCOUNTER (INPATIENT)
Facility: HOSPITAL | Age: 69
LOS: 2 days | Discharge: HOME | DRG: 281 | End: 2024-01-15
Attending: EMERGENCY MEDICINE | Admitting: INTERNAL MEDICINE
Payer: MEDICARE

## 2024-01-12 DIAGNOSIS — I25.10 CAD (CORONARY ARTERY DISEASE): ICD-10-CM

## 2024-01-12 DIAGNOSIS — R07.9 CHEST PAIN: ICD-10-CM

## 2024-01-12 DIAGNOSIS — I21.4 NSTEMI (NON-ST ELEVATED MYOCARDIAL INFARCTION): ICD-10-CM

## 2024-01-12 DIAGNOSIS — F41.9 ANXIETY: Primary | ICD-10-CM

## 2024-01-12 LAB
ALBUMIN SERPL BCP-MCNC: 4.2 G/DL (ref 3.5–5.2)
ALP SERPL-CCNC: 48 U/L (ref 55–135)
ALT SERPL W/O P-5'-P-CCNC: 16 U/L (ref 10–44)
ANION GAP SERPL CALC-SCNC: 12 MMOL/L (ref 8–16)
AST SERPL-CCNC: 15 U/L (ref 10–40)
BASOPHILS # BLD AUTO: 0.09 K/UL (ref 0–0.2)
BASOPHILS NFR BLD: 0.6 % (ref 0–1.9)
BILIRUB SERPL-MCNC: 0.4 MG/DL (ref 0.1–1)
BNP SERPL-MCNC: 93 PG/ML (ref 0–99)
BUN SERPL-MCNC: 39 MG/DL (ref 8–23)
CALCIUM SERPL-MCNC: 9.1 MG/DL (ref 8.7–10.5)
CHLORIDE SERPL-SCNC: 96 MMOL/L (ref 95–110)
CO2 SERPL-SCNC: 22 MMOL/L (ref 23–29)
CREAT SERPL-MCNC: 1.2 MG/DL (ref 0.5–1.4)
DIFFERENTIAL METHOD BLD: ABNORMAL
EOSINOPHIL # BLD AUTO: 0.3 K/UL (ref 0–0.5)
EOSINOPHIL NFR BLD: 1.6 % (ref 0–8)
ERYTHROCYTE [DISTWIDTH] IN BLOOD BY AUTOMATED COUNT: 13.6 % (ref 11.5–14.5)
EST. GFR  (NO RACE VARIABLE): 48.7 ML/MIN/1.73 M^2
GLUCOSE SERPL-MCNC: 234 MG/DL (ref 70–110)
HCT VFR BLD AUTO: 43.1 % (ref 37–48.5)
HGB BLD-MCNC: 13.7 G/DL (ref 12–16)
IMM GRANULOCYTES # BLD AUTO: 0.07 K/UL (ref 0–0.04)
IMM GRANULOCYTES NFR BLD AUTO: 0.5 % (ref 0–0.5)
LYMPHOCYTES # BLD AUTO: 2.1 K/UL (ref 1–4.8)
LYMPHOCYTES NFR BLD: 13.8 % (ref 18–48)
MAGNESIUM SERPL-MCNC: 1.9 MG/DL (ref 1.6–2.6)
MCH RBC QN AUTO: 27.8 PG (ref 27–31)
MCHC RBC AUTO-ENTMCNC: 31.8 G/DL (ref 32–36)
MCV RBC AUTO: 88 FL (ref 82–98)
MONOCYTES # BLD AUTO: 1.4 K/UL (ref 0.3–1)
MONOCYTES NFR BLD: 9.3 % (ref 4–15)
NEUTROPHILS # BLD AUTO: 11.3 K/UL (ref 1.8–7.7)
NEUTROPHILS NFR BLD: 74.2 % (ref 38–73)
NRBC BLD-RTO: 0 /100 WBC
PLATELET # BLD AUTO: 370 K/UL (ref 150–450)
PMV BLD AUTO: 10 FL (ref 9.2–12.9)
POTASSIUM SERPL-SCNC: 4.5 MMOL/L (ref 3.5–5.1)
PROT SERPL-MCNC: 8.1 G/DL (ref 6–8.4)
RBC # BLD AUTO: 4.92 M/UL (ref 4–5.4)
SODIUM SERPL-SCNC: 130 MMOL/L (ref 136–145)
TROPONIN I SERPL HS-MCNC: 21.5 PG/ML (ref 0–14.9)
WBC # BLD AUTO: 15.24 K/UL (ref 3.9–12.7)

## 2024-01-12 PROCEDURE — 80053 COMPREHEN METABOLIC PANEL: CPT | Performed by: STUDENT IN AN ORGANIZED HEALTH CARE EDUCATION/TRAINING PROGRAM

## 2024-01-12 PROCEDURE — 63600175 PHARM REV CODE 636 W HCPCS: Performed by: EMERGENCY MEDICINE

## 2024-01-12 PROCEDURE — 83735 ASSAY OF MAGNESIUM: CPT | Performed by: STUDENT IN AN ORGANIZED HEALTH CARE EDUCATION/TRAINING PROGRAM

## 2024-01-12 PROCEDURE — 84443 ASSAY THYROID STIM HORMONE: CPT | Performed by: STUDENT IN AN ORGANIZED HEALTH CARE EDUCATION/TRAINING PROGRAM

## 2024-01-12 PROCEDURE — G0378 HOSPITAL OBSERVATION PER HR: HCPCS

## 2024-01-12 PROCEDURE — C9113 INJ PANTOPRAZOLE SODIUM, VIA: HCPCS | Performed by: EMERGENCY MEDICINE

## 2024-01-12 PROCEDURE — 96361 HYDRATE IV INFUSION ADD-ON: CPT

## 2024-01-12 PROCEDURE — 93010 ELECTROCARDIOGRAM REPORT: CPT | Mod: ,,, | Performed by: INTERNAL MEDICINE

## 2024-01-12 PROCEDURE — 83036 HEMOGLOBIN GLYCOSYLATED A1C: CPT | Performed by: STUDENT IN AN ORGANIZED HEALTH CARE EDUCATION/TRAINING PROGRAM

## 2024-01-12 PROCEDURE — 99285 EMERGENCY DEPT VISIT HI MDM: CPT | Mod: 25

## 2024-01-12 PROCEDURE — 84484 ASSAY OF TROPONIN QUANT: CPT | Performed by: STUDENT IN AN ORGANIZED HEALTH CARE EDUCATION/TRAINING PROGRAM

## 2024-01-12 PROCEDURE — 85025 COMPLETE CBC W/AUTO DIFF WBC: CPT | Performed by: STUDENT IN AN ORGANIZED HEALTH CARE EDUCATION/TRAINING PROGRAM

## 2024-01-12 PROCEDURE — 80061 LIPID PANEL: CPT | Performed by: STUDENT IN AN ORGANIZED HEALTH CARE EDUCATION/TRAINING PROGRAM

## 2024-01-12 PROCEDURE — 36415 COLL VENOUS BLD VENIPUNCTURE: CPT | Performed by: STUDENT IN AN ORGANIZED HEALTH CARE EDUCATION/TRAINING PROGRAM

## 2024-01-12 PROCEDURE — 96375 TX/PRO/DX INJ NEW DRUG ADDON: CPT

## 2024-01-12 PROCEDURE — 93005 ELECTROCARDIOGRAM TRACING: CPT | Performed by: INTERNAL MEDICINE

## 2024-01-12 PROCEDURE — 84439 ASSAY OF FREE THYROXINE: CPT | Performed by: STUDENT IN AN ORGANIZED HEALTH CARE EDUCATION/TRAINING PROGRAM

## 2024-01-12 PROCEDURE — 83880 ASSAY OF NATRIURETIC PEPTIDE: CPT | Performed by: STUDENT IN AN ORGANIZED HEALTH CARE EDUCATION/TRAINING PROGRAM

## 2024-01-12 RX ORDER — ACETAMINOPHEN 325 MG/1
650 TABLET ORAL EVERY 8 HOURS PRN
Status: DISCONTINUED | OUTPATIENT
Start: 2024-01-13 | End: 2024-01-15 | Stop reason: HOSPADM

## 2024-01-12 RX ORDER — LANOLIN ALCOHOL/MO/W.PET/CERES
800 CREAM (GRAM) TOPICAL
Status: DISCONTINUED | OUTPATIENT
Start: 2024-01-13 | End: 2024-01-15 | Stop reason: HOSPADM

## 2024-01-12 RX ORDER — SODIUM,POTASSIUM PHOSPHATES 280-250MG
2 POWDER IN PACKET (EA) ORAL
Status: DISCONTINUED | OUTPATIENT
Start: 2024-01-13 | End: 2024-01-15 | Stop reason: HOSPADM

## 2024-01-12 RX ORDER — LORAZEPAM 2 MG/ML
1 INJECTION INTRAMUSCULAR
Status: COMPLETED | OUTPATIENT
Start: 2024-01-12 | End: 2024-01-12

## 2024-01-12 RX ORDER — ONDANSETRON HYDROCHLORIDE 2 MG/ML
4 INJECTION, SOLUTION INTRAVENOUS EVERY 8 HOURS PRN
Status: DISCONTINUED | OUTPATIENT
Start: 2024-01-13 | End: 2024-01-15 | Stop reason: HOSPADM

## 2024-01-12 RX ORDER — GLUCAGON 1 MG
1 KIT INJECTION
Status: DISCONTINUED | OUTPATIENT
Start: 2024-01-13 | End: 2024-01-15 | Stop reason: HOSPADM

## 2024-01-12 RX ORDER — INSULIN ASPART 100 [IU]/ML
0-5 INJECTION, SOLUTION INTRAVENOUS; SUBCUTANEOUS
Status: DISCONTINUED | OUTPATIENT
Start: 2024-01-13 | End: 2024-01-15 | Stop reason: HOSPADM

## 2024-01-12 RX ORDER — IBUPROFEN 200 MG
24 TABLET ORAL
Status: DISCONTINUED | OUTPATIENT
Start: 2024-01-13 | End: 2024-01-15 | Stop reason: HOSPADM

## 2024-01-12 RX ORDER — IBUPROFEN 200 MG
16 TABLET ORAL
Status: DISCONTINUED | OUTPATIENT
Start: 2024-01-13 | End: 2024-01-15 | Stop reason: HOSPADM

## 2024-01-12 RX ORDER — ENOXAPARIN SODIUM 100 MG/ML
40 INJECTION SUBCUTANEOUS EVERY 24 HOURS
Status: DISCONTINUED | OUTPATIENT
Start: 2024-01-13 | End: 2024-01-13

## 2024-01-12 RX ORDER — PANTOPRAZOLE SODIUM 40 MG/10ML
40 INJECTION, POWDER, LYOPHILIZED, FOR SOLUTION INTRAVENOUS
Status: COMPLETED | OUTPATIENT
Start: 2024-01-12 | End: 2024-01-12

## 2024-01-12 RX ORDER — PROMETHAZINE HYDROCHLORIDE 25 MG/1
25 TABLET ORAL EVERY 6 HOURS PRN
Status: DISCONTINUED | OUTPATIENT
Start: 2024-01-13 | End: 2024-01-15 | Stop reason: HOSPADM

## 2024-01-12 RX ORDER — ONDANSETRON HYDROCHLORIDE 2 MG/ML
8 INJECTION, SOLUTION INTRAVENOUS
Status: COMPLETED | OUTPATIENT
Start: 2024-01-12 | End: 2024-01-12

## 2024-01-12 RX ORDER — SODIUM CHLORIDE 0.9 % (FLUSH) 0.9 %
2 SYRINGE (ML) INJECTION
Status: DISCONTINUED | OUTPATIENT
Start: 2024-01-13 | End: 2024-01-15 | Stop reason: HOSPADM

## 2024-01-12 RX ORDER — TALC
6 POWDER (GRAM) TOPICAL NIGHTLY PRN
Status: DISCONTINUED | OUTPATIENT
Start: 2024-01-13 | End: 2024-01-15 | Stop reason: HOSPADM

## 2024-01-12 RX ADMIN — SODIUM CHLORIDE, SODIUM LACTATE, POTASSIUM CHLORIDE, AND CALCIUM CHLORIDE 1000 ML: .6; .31; .03; .02 INJECTION, SOLUTION INTRAVENOUS at 09:01

## 2024-01-12 RX ADMIN — LORAZEPAM 1 MG: 2 INJECTION INTRAMUSCULAR; INTRAVENOUS at 09:01

## 2024-01-12 RX ADMIN — PANTOPRAZOLE SODIUM 40 MG: 40 INJECTION, POWDER, FOR SOLUTION INTRAVENOUS at 09:01

## 2024-01-12 RX ADMIN — ONDANSETRON 8 MG: 2 INJECTION INTRAMUSCULAR; INTRAVENOUS at 09:01

## 2024-01-12 NOTE — Clinical Note
The catheter was repositioned into the ostium   right coronary artery. An angiography was performed of the right coronary arteries. Multiple views were taken. The angiography was performed via power injection. The injected amount was 6 mL contrast at 3 mL/s. The PSI from the power injection was 300. The result was suboptimal..

## 2024-01-12 NOTE — Clinical Note
The catheter was inserted into the left ventricle. An angiography was performed of the LV. Multiple views were taken. The angiography was performed via power injection. The injected amount was 24 mL contrast at 12 mL/s.

## 2024-01-12 NOTE — Clinical Note
110 ml of contrast were injected throughout the case. 40 mL of contrast was the total wasted during the case. 150 mL was the total amount used during the case.

## 2024-01-12 NOTE — Clinical Note
The catheter was inserted into the and was inserted over the wire into the ROOT. An angiography was performed of the ROOT. Multiple views were taken. The angiography was performed via power injection. The injected amount was 30 mL contrast at 10 mL/s.

## 2024-01-12 NOTE — Clinical Note
The catheter was inserted into the and was inserted over the wire into the aorta ostial LIMA graft. An angiography was performed of the graft. Multiple views were taken. The angiography was performed via power injection. The injected amount was 8 mL contrast at 4 mL/s. The PSI from the power injection was 450.

## 2024-01-12 NOTE — Clinical Note
Diagnosis: Anxiety [[id number]]   Future Attending Provider: LEIGHA RIVERS [[id number]]   Place in Observation: [facility] [[id number]]

## 2024-01-13 ENCOUNTER — CLINICAL SUPPORT (OUTPATIENT)
Dept: CARDIOLOGY | Facility: HOSPITAL | Age: 69
DRG: 281 | End: 2024-01-13
Attending: EMERGENCY MEDICINE
Payer: MEDICARE

## 2024-01-13 VITALS — HEIGHT: 62 IN | WEIGHT: 160 LBS | BODY MASS INDEX: 29.44 KG/M2

## 2024-01-13 LAB
ANION GAP SERPL CALC-SCNC: 9 MMOL/L (ref 8–16)
APTT PPP: 25.4 SEC (ref 21–32)
APTT PPP: 32.5 SEC (ref 21–32)
BASOPHILS # BLD AUTO: 0.05 K/UL (ref 0–0.2)
BASOPHILS NFR BLD: 0.4 % (ref 0–1.9)
BUN SERPL-MCNC: 32 MG/DL (ref 8–23)
CALCIUM SERPL-MCNC: 9.3 MG/DL (ref 8.7–10.5)
CHLORIDE SERPL-SCNC: 102 MMOL/L (ref 95–110)
CHOLEST SERPL-MCNC: 143 MG/DL (ref 120–199)
CHOLEST/HDLC SERPL: 3.2 {RATIO} (ref 2–5)
CO2 SERPL-SCNC: 25 MMOL/L (ref 23–29)
CREAT SERPL-MCNC: 1.1 MG/DL (ref 0.5–1.4)
DIFFERENTIAL METHOD BLD: ABNORMAL
EOSINOPHIL # BLD AUTO: 0.1 K/UL (ref 0–0.5)
EOSINOPHIL NFR BLD: 0.8 % (ref 0–8)
ERYTHROCYTE [DISTWIDTH] IN BLOOD BY AUTOMATED COUNT: 13.7 % (ref 11.5–14.5)
EST. GFR  (NO RACE VARIABLE): 54.1 ML/MIN/1.73 M^2
ESTIMATED AVG GLUCOSE: 272 MG/DL (ref 68–131)
GLUCOSE SERPL-MCNC: 144 MG/DL (ref 70–110)
GLUCOSE SERPL-MCNC: 151 MG/DL (ref 70–110)
GLUCOSE SERPL-MCNC: 166 MG/DL (ref 70–110)
GLUCOSE SERPL-MCNC: 176 MG/DL (ref 70–110)
GLUCOSE SERPL-MCNC: 183 MG/DL (ref 70–110)
GLUCOSE SERPL-MCNC: 208 MG/DL (ref 70–110)
HBA1C MFR BLD: 11.1 % (ref 4.5–6.2)
HCT VFR BLD AUTO: 37.2 % (ref 37–48.5)
HDLC SERPL-MCNC: 45 MG/DL (ref 40–75)
HDLC SERPL: 31.5 % (ref 20–50)
HGB BLD-MCNC: 12.1 G/DL (ref 12–16)
IMM GRANULOCYTES # BLD AUTO: 0.03 K/UL (ref 0–0.04)
IMM GRANULOCYTES NFR BLD AUTO: 0.2 % (ref 0–0.5)
INR PPP: 1 (ref 0.8–1.2)
LDLC SERPL CALC-MCNC: 75.4 MG/DL (ref 63–159)
LYMPHOCYTES # BLD AUTO: 3.2 K/UL (ref 1–4.8)
LYMPHOCYTES NFR BLD: 24.8 % (ref 18–48)
MCH RBC QN AUTO: 28.1 PG (ref 27–31)
MCHC RBC AUTO-ENTMCNC: 32.5 G/DL (ref 32–36)
MCV RBC AUTO: 87 FL (ref 82–98)
MONOCYTES # BLD AUTO: 1.7 K/UL (ref 0.3–1)
MONOCYTES NFR BLD: 12.9 % (ref 4–15)
NEUTROPHILS # BLD AUTO: 7.9 K/UL (ref 1.8–7.7)
NEUTROPHILS NFR BLD: 60.9 % (ref 38–73)
NONHDLC SERPL-MCNC: 98 MG/DL
NRBC BLD-RTO: 0 /100 WBC
PLATELET # BLD AUTO: 323 K/UL (ref 150–450)
PMV BLD AUTO: 10.1 FL (ref 9.2–12.9)
POTASSIUM SERPL-SCNC: 4.3 MMOL/L (ref 3.5–5.1)
PROTHROMBIN TIME: 10.7 SEC (ref 9–12.5)
RBC # BLD AUTO: 4.3 M/UL (ref 4–5.4)
SODIUM SERPL-SCNC: 136 MMOL/L (ref 136–145)
T4 FREE SERPL-MCNC: 0.95 NG/DL (ref 0.71–1.51)
TRIGL SERPL-MCNC: 113 MG/DL (ref 30–150)
TROPONIN I SERPL HS-MCNC: 145.9 PG/ML (ref 0–14.9)
TROPONIN I SERPL HS-MCNC: 332.7 PG/ML (ref 0–14.9)
TROPONIN I SERPL HS-MCNC: 352.7 PG/ML (ref 0–14.9)
TROPONIN I SERPL HS-MCNC: 387.7 PG/ML (ref 0–14.9)
TSH SERPL DL<=0.005 MIU/L-ACNC: 0.07 UIU/ML (ref 0.34–5.6)
WBC # BLD AUTO: 13 K/UL (ref 3.9–12.7)

## 2024-01-13 PROCEDURE — 85730 THROMBOPLASTIN TIME PARTIAL: CPT

## 2024-01-13 PROCEDURE — 36415 COLL VENOUS BLD VENIPUNCTURE: CPT

## 2024-01-13 PROCEDURE — 96372 THER/PROPH/DIAG INJ SC/IM: CPT

## 2024-01-13 PROCEDURE — 96375 TX/PRO/DX INJ NEW DRUG ADDON: CPT

## 2024-01-13 PROCEDURE — 82962 GLUCOSE BLOOD TEST: CPT

## 2024-01-13 PROCEDURE — 93010 ELECTROCARDIOGRAM REPORT: CPT | Mod: ,,, | Performed by: GENERAL PRACTICE

## 2024-01-13 PROCEDURE — 96365 THER/PROPH/DIAG IV INF INIT: CPT

## 2024-01-13 PROCEDURE — 25000003 PHARM REV CODE 250

## 2024-01-13 PROCEDURE — 93306 TTE W/DOPPLER COMPLETE: CPT | Mod: 26,,, | Performed by: INTERNAL MEDICINE

## 2024-01-13 PROCEDURE — 85610 PROTHROMBIN TIME: CPT

## 2024-01-13 PROCEDURE — C8929 TTE W OR WO FOL WCON,DOPPLER: HCPCS

## 2024-01-13 PROCEDURE — 84484 ASSAY OF TROPONIN QUANT: CPT | Mod: 91

## 2024-01-13 PROCEDURE — 96366 THER/PROPH/DIAG IV INF ADDON: CPT

## 2024-01-13 PROCEDURE — 36415 COLL VENOUS BLD VENIPUNCTURE: CPT | Performed by: INTERNAL MEDICINE

## 2024-01-13 PROCEDURE — 84484 ASSAY OF TROPONIN QUANT: CPT | Performed by: STUDENT IN AN ORGANIZED HEALTH CARE EDUCATION/TRAINING PROGRAM

## 2024-01-13 PROCEDURE — 63600175 PHARM REV CODE 636 W HCPCS

## 2024-01-13 PROCEDURE — 25500020 PHARM REV CODE 255: Performed by: STUDENT IN AN ORGANIZED HEALTH CARE EDUCATION/TRAINING PROGRAM

## 2024-01-13 PROCEDURE — 80048 BASIC METABOLIC PNL TOTAL CA: CPT

## 2024-01-13 PROCEDURE — 85730 THROMBOPLASTIN TIME PARTIAL: CPT | Mod: 91 | Performed by: INTERNAL MEDICINE

## 2024-01-13 PROCEDURE — 21400001 HC TELEMETRY ROOM

## 2024-01-13 PROCEDURE — 99223 1ST HOSP IP/OBS HIGH 75: CPT | Mod: ,,, | Performed by: STUDENT IN AN ORGANIZED HEALTH CARE EDUCATION/TRAINING PROGRAM

## 2024-01-13 PROCEDURE — 25000003 PHARM REV CODE 250: Performed by: INTERNAL MEDICINE

## 2024-01-13 PROCEDURE — 85025 COMPLETE CBC W/AUTO DIFF WBC: CPT

## 2024-01-13 PROCEDURE — 93005 ELECTROCARDIOGRAM TRACING: CPT | Performed by: GENERAL PRACTICE

## 2024-01-13 RX ORDER — ASPIRIN 81 MG/1
81 TABLET ORAL DAILY
Status: DISCONTINUED | OUTPATIENT
Start: 2024-01-13 | End: 2024-01-15 | Stop reason: HOSPADM

## 2024-01-13 RX ORDER — ATORVASTATIN CALCIUM 20 MG/1
20 TABLET, FILM COATED ORAL DAILY
Status: DISCONTINUED | OUTPATIENT
Start: 2024-01-13 | End: 2024-01-15 | Stop reason: HOSPADM

## 2024-01-13 RX ORDER — DIPHENHYDRAMINE HCL 25 MG
50 CAPSULE ORAL
Status: DISCONTINUED | OUTPATIENT
Start: 2024-01-13 | End: 2024-01-14 | Stop reason: HOSPADM

## 2024-01-13 RX ORDER — ESCITALOPRAM OXALATE 10 MG/1
10 TABLET ORAL 2 TIMES DAILY
Status: DISCONTINUED | OUTPATIENT
Start: 2024-01-13 | End: 2024-01-15 | Stop reason: HOSPADM

## 2024-01-13 RX ORDER — AMOXICILLIN 500 MG
1 CAPSULE ORAL DAILY
COMMUNITY

## 2024-01-13 RX ORDER — METOPROLOL SUCCINATE 25 MG/1
50 TABLET, EXTENDED RELEASE ORAL DAILY
Status: DISCONTINUED | OUTPATIENT
Start: 2024-01-13 | End: 2024-01-13

## 2024-01-13 RX ORDER — CLOPIDOGREL BISULFATE 75 MG/1
75 TABLET ORAL DAILY
Status: DISCONTINUED | OUTPATIENT
Start: 2024-01-13 | End: 2024-01-15 | Stop reason: HOSPADM

## 2024-01-13 RX ORDER — CALCIUM CARB, CITRATE, MALATE 250 MG
1 CAPSULE ORAL DAILY
COMMUNITY

## 2024-01-13 RX ORDER — SODIUM CHLORIDE 9 MG/ML
INJECTION, SOLUTION INTRAVENOUS ONCE
Status: DISCONTINUED | OUTPATIENT
Start: 2024-01-14 | End: 2024-01-14 | Stop reason: HOSPADM

## 2024-01-13 RX ORDER — FUROSEMIDE 40 MG/1
40 TABLET ORAL 2 TIMES DAILY
Status: DISCONTINUED | OUTPATIENT
Start: 2024-01-13 | End: 2024-01-15 | Stop reason: HOSPADM

## 2024-01-13 RX ORDER — LISINOPRIL 10 MG/1
10 TABLET ORAL DAILY
Status: DISCONTINUED | OUTPATIENT
Start: 2024-01-13 | End: 2024-01-15 | Stop reason: HOSPADM

## 2024-01-13 RX ORDER — NITROGLYCERIN 0.3 MG/1
0.3 TABLET SUBLINGUAL EVERY 5 MIN PRN
Status: DISCONTINUED | OUTPATIENT
Start: 2024-01-13 | End: 2024-01-15 | Stop reason: HOSPADM

## 2024-01-13 RX ORDER — HEPARIN SODIUM,PORCINE/D5W 25000/250
0-40 INTRAVENOUS SOLUTION INTRAVENOUS CONTINUOUS
Status: DISCONTINUED | OUTPATIENT
Start: 2024-01-13 | End: 2024-01-14

## 2024-01-13 RX ORDER — METOPROLOL SUCCINATE 25 MG/1
25 TABLET, EXTENDED RELEASE ORAL DAILY
Status: DISCONTINUED | OUTPATIENT
Start: 2024-01-13 | End: 2024-01-15 | Stop reason: HOSPADM

## 2024-01-13 RX ORDER — LORAZEPAM 2 MG/ML
1 INJECTION INTRAMUSCULAR EVERY 6 HOURS PRN
Status: DISCONTINUED | OUTPATIENT
Start: 2024-01-13 | End: 2024-01-15 | Stop reason: HOSPADM

## 2024-01-13 RX ADMIN — INSULIN ASPART 2 UNITS: 100 INJECTION, SOLUTION INTRAVENOUS; SUBCUTANEOUS at 05:01

## 2024-01-13 RX ADMIN — FUROSEMIDE 40 MG: 40 TABLET ORAL at 12:01

## 2024-01-13 RX ADMIN — VANCOMYCIN HYDROCHLORIDE 125 MG: KIT at 05:01

## 2024-01-13 RX ADMIN — ESCITALOPRAM OXALATE 10 MG: 10 TABLET ORAL at 12:01

## 2024-01-13 RX ADMIN — LORAZEPAM 1 MG: 2 INJECTION INTRAMUSCULAR; INTRAVENOUS at 11:01

## 2024-01-13 RX ADMIN — ESCITALOPRAM OXALATE 10 MG: 10 TABLET ORAL at 02:01

## 2024-01-13 RX ADMIN — METOPROLOL SUCCINATE 25 MG: 25 TABLET, EXTENDED RELEASE ORAL at 05:01

## 2024-01-13 RX ADMIN — CLOPIDOGREL BISULFATE 75 MG: 75 TABLET, FILM COATED ORAL at 12:01

## 2024-01-13 RX ADMIN — PERFLUTREN 1 ML: 6.52 INJECTION, SUSPENSION INTRAVENOUS at 11:01

## 2024-01-13 RX ADMIN — ESCITALOPRAM OXALATE 10 MG: 10 TABLET ORAL at 09:01

## 2024-01-13 RX ADMIN — INSULIN DETEMIR 30 UNITS: 100 INJECTION, SOLUTION SUBCUTANEOUS at 03:01

## 2024-01-13 RX ADMIN — HEPARIN SODIUM 12 UNITS/KG/HR: 10000 INJECTION, SOLUTION INTRAVENOUS at 02:01

## 2024-01-13 RX ADMIN — ASPIRIN 81 MG: 81 TABLET, COATED ORAL at 12:01

## 2024-01-13 RX ADMIN — ATORVASTATIN CALCIUM 20 MG: 20 TABLET, FILM COATED ORAL at 09:01

## 2024-01-13 NOTE — ED PROVIDER NOTES
Encounter Date: 2024       History     Chief Complaint   Patient presents with    Chest Pain     Pt arrived by ems, pt states she began with chest pain, nausea and vomiting followed. Pt states after her nitro pain subsided. Pt states it may be anxiety but she does have a cardiac hx. Upon arrival to ed pt states she currently has no chest pain      68-year-old female presented emergency department complaining of substernal chest tightness.  [facility] and patient became very anxious and started having chest pain and tightness.  EMS arrived to the scene and patient took aspirin prior to EMS coming there.  Patient states her anxiety and chest pain improved and now is pain-free at this time.  Patient however is still very anxious.  Denies fever or chills or vomiting.  Denies abdominal pain or weakness or numbness.  Patient had slight nausea      Review of patient's allergies indicates:  No Known Allergies  Past Medical History:   Diagnosis Date    Anxiety     CAD (coronary artery disease)     CHF (congestive heart failure)     Chronic low back pain     Diabetes 2012    Diabetes mellitus     Hyperkalemia 2022    Hypertension     Leukocytosis 2022    Metabolic acidosis with respiratory acidosis 2022     Past Surgical History:   Procedure Laterality Date    CARDIAC DEFIBRILLATOR PLACEMENT      CORONARY ARTERY BYPASS GRAFT  2007    HYSTERECTOMY       Family History   Problem Relation Age of Onset    Diabetes Mother     Mental illness Brother     Heart disease Brother         MI    Cerebral aneurysm Brother      Social History     Tobacco Use    Smoking status: Former     Current packs/day: 0.00     Types: Cigarettes     Quit date:      Years since quittin.0    Smokeless tobacco: Never   Substance Use Topics    Alcohol use: No     Review of Systems   Constitutional: Negative.    HENT: Negative.     Eyes: Negative.     Respiratory: Negative.     Cardiovascular:  Positive for chest pain.   Gastrointestinal: Negative.    Endocrine: Negative.    Genitourinary: Negative.    Musculoskeletal: Negative.    Skin: Negative.    Allergic/Immunologic: Negative.    Neurological: Negative.    Hematological: Negative.    Psychiatric/Behavioral:  The patient is nervous/anxious.    All other systems reviewed and are negative.      Physical Exam     Initial Vitals [01/12/24 2050]   BP Pulse Resp Temp SpO2   131/60 (!) 112 18 98.5 °F (36.9 °C) 96 %      MAP       --         Physical Exam    Nursing note and vitals reviewed.  Constitutional: She appears well-developed and well-nourished.   HENT:   Head: Normocephalic and atraumatic.   Nose: Nose normal.   Mouth/Throat: Oropharynx is clear and moist.   Eyes: Conjunctivae and EOM are normal. Pupils are equal, round, and reactive to light.   Neck: Neck supple. No thyromegaly present. No tracheal deviation present. No JVD present.   Normal range of motion.  Cardiovascular:  Regular rhythm, normal heart sounds and intact distal pulses.           No murmur heard.  Mild tachycardia   Pulmonary/Chest: Breath sounds normal. No stridor. No respiratory distress. She has no wheezes. She has no rales.   Abdominal: Abdomen is soft. Bowel sounds are normal. She exhibits no distension. There is no abdominal tenderness.   Musculoskeletal:         General: No edema. Normal range of motion.      Cervical back: Normal range of motion and neck supple.     Neurological: She is alert and oriented to person, place, and time. She has normal strength. GCS score is 15. GCS eye subscore is 4. GCS verbal subscore is 5. GCS motor subscore is 6.   Skin: Skin is warm. Capillary refill takes less than 2 seconds.   Psychiatric: She has a normal mood and affect. Thought content normal.         ED Course   Procedures  Labs Reviewed   CBC W/ AUTO DIFFERENTIAL - Abnormal; Notable for the following components:       Result Value    WBC  15.24 (*)     MCHC 31.8 (*)     Gran # (ANC) 11.3 (*)     Immature Grans (Abs) 0.07 (*)     Mono # 1.4 (*)     Gran % 74.2 (*)     Lymph % 13.8 (*)     All other components within normal limits   COMPREHENSIVE METABOLIC PANEL - Abnormal; Notable for the following components:    Sodium 130 (*)     CO2 22 (*)     Glucose 234 (*)     BUN 39 (*)     Alkaline Phosphatase 48 (*)     eGFR 48.7 (*)     All other components within normal limits   TROPONIN I HIGH SENSITIVITY - Abnormal; Notable for the following components:    Troponin I High Sensitivity 21.5 (*)     All other components within normal limits   MAGNESIUM   B-TYPE NATRIURETIC PEPTIDE   B-TYPE NATRIURETIC PEPTIDE   TROPONIN I HIGH SENSITIVITY     EKG Readings: (Independently Interpreted)   Initial Reading: No STEMI. Rhythm: Sinus Tachycardia. Ectopy: No Ectopy. Conduction: 2nd Degree AV Block - Type II.       Imaging Results              X-Ray Chest AP Portable (In process)                   X-Rays:   Independently Interpreted Readings:   Other Readings:  Chest x-ray unremarkable    Medications   LORazepam injection 1 mg (1 mg Intravenous Given 1/12/24 2121)   lactated ringers bolus 1,000 mL (1,000 mLs Intravenous New Bag 1/12/24 2122)   pantoprazole injection 40 mg (40 mg Intravenous Given 1/12/24 2121)   ondansetron injection 8 mg (8 mg Intravenous Given 1/12/24 2120)     Medical Decision Making  68-year-old female presented emergency department with anxiety and chest tightness.  Patient felt nauseated however patient did have improvement of symptoms with treatment.  Patient's symptoms likely started secondary to anxiety however patient has underlying heart disease.  Patient does have abnormal EKG along with elevated troponin and given patient's history and presentation Hospital Medicine consulted for further evaluation and management.  Aspirin taken by patient prior to arrival.  Patient currently is pain-free    Amount and/or Complexity of Data  Reviewed  Labs: ordered. Decision-making details documented in ED Course.  Radiology: ordered. Decision-making details documented in ED Course.  ECG/medicine tests: ordered and independent interpretation performed. Decision-making details documented in ED Course.    Risk  Prescription drug management.  Decision regarding hospitalization.                                      Clinical Impression:  Final diagnoses:  [R07.9] Chest pain  [F41.9] Anxiety (Primary)          ED Disposition Condition    Admit Laurel Kelly MD  01/12/24 4536

## 2024-01-13 NOTE — ED NOTES
Adult Physical Assessment  LOC: Celine Steen, 68 y.o. female verified via two identifiers.  The patient is awake, alert, oriented and speaking appropriately at this time.  APPEARANCE: Patient resting comfortably and appears to be in no acute distress at this time. Patient is clean and well groomed, patient's clothing is properly fastened.  SKIN:The skin is warm and dry, color consistent with ethnicity, patient has normal skin turgor and moist mucus membranes, skin intact  MUSCULOSKELETAL: Patient moving all extremities well, no obvious swelling or deformities noted.  RESPIRATORY: Airway is open and patent, respirations are spontaneous, patient has a normal effort and rate  CARDIAC: Patient has a normal rate and rhythm  ABDOMEN: Soft and non tender to palpation, no abdominal distention noted.   NEUROLOGIC: Eyes open spontaneously, behavior appropriate to situation, follows commands, facial expression symmetrical, bilateral hand grasp equal and even, purposeful motor response noted, normal sensation in all extremities when touched with a finger.

## 2024-01-13 NOTE — ASSESSMENT & PLAN NOTE
Diabetic diet   Hold all home oral glucose lowering medications   Home insulin regimen ordered   Sliding scale insulin ordered   Continue to monitor blood glucose with POC glucose checks

## 2024-01-13 NOTE — CONSULTS
[facility] - Emergency Dept  Department of Cardiology  Consult Note      PATIENT NAME: Celine Steen  MRN: [id number]  TODAY'S DATE: 01/13/2024  ADMIT DATE: 1/12/2024                          CONSULT REQUESTED BY: Ady Marc MD    SUBJECTIVE     PRINCIPAL PROBLEM: Chest pain      REASON FOR CONSULT:    Chest Pain      HPI:    Patient is a 68-year-old female with past medical history of CAD, CHF, uncontrolled diabetes mellitus (HGB A1C 11), hypertension, anxiety, hyperlipidemia who presented to the ED with c/o of CP and shortness of breath and vomiting that started yesterday after very stressful event involving police unexpectedly arriving and searching her home.  She denies anginal equivalent symptoms prior to this event.  She states she was able to perform ADLs without any CP/URBAN.    Elevated troponin HS- 21 > 145, > 332, > 352.  BNP within normal range.  No acte findings on CXR.         FROM H&P     Chest Pain       Pt arrived by ems, pt states she began with chest pain, nausea and vomiting followed. Pt states after her nitro pain subsided. Pt states it may be anxiety but she does have a cardiac hx. Upon arrival to ed pt states she currently has no chest pain          HPI: 69 y/o female with history of CAD, CHF, DM, HTN, anxiety, and hypercholesterolemia presents to the ED for central pressure like chest pain that started just prior to arrival to the ED. Patient states that she was sitting in her home with her  when suddenly the  department burst in and started to execute a search warrant. The patient states that they were not looking for her or her , they were looking for a woman that has been living with them. Patient states that when this happened she became very anxious, started having central pressure like chest pain, and was nauseated/weak and vomited. Patient states that she took a nitroglycerin and her chest pain resolved. She states that her heart was racing and she  was shaky and weak so she decided to come to the ED to get checked out. The patient took ASA prior to the EMS arriving and on arrival to the ED her pain, nausea, and vomiting had mostly resolved. Patient denies any radiation of her chest pain, diaphoresis, falls, dizziness, or lightheadedness. Code status was discussed and patient was placed as a full code at this time.      ED: Vitals showed , /60, RR 18 saturating at 96% on RA. Labs were significant for sodium 130, glucose 234, troponin 21.5, and WBC 15.24.     Review of patient's allergies indicates:  No Known Allergies    Past Medical History:   Diagnosis Date    Anxiety     CAD (coronary artery disease)     CHF (congestive heart failure)     Chronic low back pain     Diabetes 2012    Diabetes mellitus     Hyperkalemia 2022    Hypertension     Leukocytosis 2022    Metabolic acidosis with respiratory acidosis 2022     Past Surgical History:   Procedure Laterality Date    CARDIAC DEFIBRILLATOR PLACEMENT      CORONARY ARTERY BYPASS GRAFT      HYSTERECTOMY       Social History     Tobacco Use    Smoking status: Former     Current packs/day: 0.00     Types: Cigarettes     Quit date:      Years since quittin.0    Smokeless tobacco: Never   Substance Use Topics    Alcohol use: No        REVIEW OF SYSTEMS    As mentioned in HPI    OBJECTIVE     VITAL SIGNS (Most Recent)  Temp: 97.6 °F (36.4 °C) (24)  Pulse: 91 (24)  Resp: 20 (24)  BP: (!) 108/54 (24)  SpO2: (!) 93 % (24)    VENTILATION STATUS  Resp: 20 (24)  SpO2: (!) 93 % (24)           I & O (Last 24H):  Intake/Output Summary (Last 24 hours) at 2024 1056  Last data filed at 2024 2310  Gross per 24 hour   Intake 1000 ml   Output --   Net 1000 ml       WEIGHTS  Wt Readings from Last 3 Encounters:   24 72.6 kg (160 lb)   12/15/23 1117 77.9 kg (171 lb 11.8 oz)   23 1305  78.4 kg (172 lb 15.2 oz)       PHYSICAL EXAM    CONSTITUTIONAL: NAD  HEENT: Normocephalic. No pallor  NECK: no JVD  LUNGS: CTA b/l  HEART: regular rate and rhythm, S1, S2 normal, no murmur   ABDOMEN: soft, non-tender, bowel sounds normal  EXTREMITIES: No edema  SKIN: No rash  NEURO: AAO X 3  PSYCH: normal affect      HOME MEDICATIONS:  No current facility-administered medications on file prior to encounter.     Current Outpatient Medications on File Prior to Encounter   Medication Sig Dispense Refill    aspirin (ECOTRIN) 81 MG EC tablet Take 81 mg by mouth once daily.      azelastine (ASTELIN) 137 mcg (0.1 %) nasal spray 1 spray (137 mcg total) by Nasal route 2 (two) times daily. for 7 days 30 mL 0    clopidogreL (PLAVIX) 75 mg tablet Take 1 tablet (75 mg total) by mouth once daily. 90 tablet 3    coenzyme Q10 200 mg capsule Take 200 mg by mouth 2 (two) times daily.      EScitalopram oxalate (LEXAPRO) 20 MG tablet TAKE 1/2 TABLET BY MOUTH TWICE DAILY (Patient taking differently: Take 10 mg by mouth 2 (two) times a day. TAKE 1/2 TABLET BY MOUTH TWICE DAILY) 90 tablet 0    furosemide (LASIX) 40 MG tablet Take 1 tablet (40 mg total) by mouth 2 (two) times daily. 180 tablet 2    insulin detemir U-100, Levemir, 100 unit/mL (3 mL) SubQ InPn pen Inject 30 Units into the skin every evening. 9 mL 11    lisinopriL 10 MG tablet Take 1 tablet (10 mg total) by mouth once daily. 90 tablet 3    metoprolol succinate (TOPROL-XL) 50 MG 24 hr tablet Take 2 tablets (100 mg total) by mouth once daily. 180 tablet 3    mirtazapine (REMERON) 7.5 MG Tab Take 1 tablet (7.5 mg total) by mouth every evening. (Patient taking differently: Take 7.5 mg by mouth as needed (Sleep).) 30 tablet 11    nitroGLYCERIN (NITROSTAT) 0.3 MG SL tablet Place 1 tablet (0.3 mg total) under the tongue every 5 (five) minutes as needed for Chest pain. 25 tablet 12    omega-3 fatty acids/fish oil (FISH OIL-OMEGA-3 FATTY ACIDS) 300-1,000 mg capsule Take 1 capsule by  "mouth once daily.      ondansetron (ZOFRAN-ODT) 4 MG TbDL Take 1 tablet (4 mg total) by mouth every 8 (eight) hours as needed (nauseau). 30 tablet 0    potassium citrate 99 mg Cap Take 1 capsule by mouth once daily.      simvastatin (ZOCOR) 40 MG tablet Take 1 tablet (40 mg total) by mouth every evening. 90 tablet 3    blood sugar diagnostic (FREESTYLE LITE STRIPS) Strp 1 each by Misc.(Non-Drug; Combo Route) route 4 (four) times daily. 200 each 0    blood-glucose meter kit Use as instructed 1 each 0    lancets (LANCETS,THIN) Misc 1 each by Misc.(Non-Drug; Combo Route) route 4 (four) times daily. 200 each 0    pen needle, diabetic (DROPLET PEN NEEDLE) 32 gauge x 5/32" Ndle 1 each by Misc.(Non-Drug; Combo Route) route 3 (three) times daily. 90 each 3       SCHEDULED MEDS:   aspirin  81 mg Oral Daily    atorvastatin  20 mg Oral Daily    clopidogreL  75 mg Oral Daily    enoxparin  40 mg Subcutaneous Daily    EScitalopram oxalate  10 mg Oral BID    furosemide  40 mg Oral BID    insulin detemir U-100 (Levemir)  30 Units Subcutaneous QHS    lisinopriL  10 mg Oral Daily       CONTINUOUS INFUSIONS:    PRN MEDS:acetaminophen, acetaminophen, dextrose 50%, dextrose 50%, glucagon (human recombinant), glucose, glucose, insulin aspart U-100, lorazepam, magnesium oxide, magnesium oxide, melatonin, nitroGLYCERIN, ondansetron, potassium bicarbonate, potassium bicarbonate, potassium bicarbonate, potassium, sodium phosphates, potassium, sodium phosphates, potassium, sodium phosphates, promethazine, sodium chloride 0.9%    LABS AND DIAGNOSTICS     CBC LAST 3 DAYS  Recent Labs   Lab 01/12/24  2110 01/13/24  0326   WBC 15.24* 13.00*   RBC 4.92 4.30   HGB 13.7 12.1   HCT 43.1 37.2   MCV 88 87   MCH 27.8 28.1   MCHC 31.8* 32.5   RDW 13.6 13.7    323   MPV 10.0 10.1   GRAN 74.2*  11.3* 60.9  7.9*   LYMPH 13.8*  2.1 24.8  3.2   MONO 9.3  1.4* 12.9  1.7*   BASO 0.09 0.05   NRBC 0 0       COAGULATION LAST 3 DAYS  No results for " "input(s): "LABPT", "INR", "APTT" in the last 168 hours.    CHEMISTRY LAST 3 DAYS  Recent Labs   Lab 01/12/24 2110 01/13/24  0326   * 136   K 4.5 4.3   CL 96 102   CO2 22* 25   ANIONGAP 12 9   BUN 39* 32*   CREATININE 1.2 1.1   * 144*   CALCIUM 9.1 9.3   MG 1.9  --    ALBUMIN 4.2  --    PROT 8.1  --    ALKPHOS 48*  --    ALT 16  --    AST 15  --    BILITOT 0.4  --        CARDIAC PROFILE LAST 3 DAYS  Recent Labs   Lab 01/12/24 2110 01/13/24  0039 01/13/24  0326 01/13/24  0540   BNP 93  --   --   --    TROPONINIHS 21.5* 145.9* 332.7* 387.7*       ENDOCRINE LAST 3 DAYS  Recent Labs   Lab 01/12/24 2110   TSH 0.069*       LAST ARTERIAL BLOOD GAS  ABG  No results for input(s): "PH", "PO2", "PCO2", "HCO3", "BE" in the last 168 hours.    LAST 7 DAYS MICROBIOLOGY   Microbiology Results (last 7 days)       ** No results found for the last 168 hours. **            MOST RECENT IMAGING  X-Ray Chest AP Portable  Chest single view    CLINICAL DATA: Chest pain    FINDINGS: AP view shows the heart within normal size limits. There has been previous median sternotomy. Single lead implantable cardiac device is noted.    Pulmonary vasculature is upper normal, with no focal infiltrate or effusion identified. Osseous structures are unremarkable    IMPRESSION:  1. No acute radiographic abnormalities.    Electronically signed by:  Peterson Welch MD  01/13/2024 08:13 AM [facility] Workstation: [id number]      ECHOCARDIOGRAM RESULTS (last 5)  Results for orders placed during the hospital encounter of 02/13/22    Echo    Interpretation Summary  · The estimated ejection fraction is 35%.  · The left ventricle is normal in size with mild concentric hypertrophy and moderately decreased systolic function.  · Grade II left ventricular diastolic dysfunction.  · Moderate left atrial enlargement.  · There is moderate left ventricular global hypokinesis.  · Mild right atrial enlargement.  · Mild mitral regurgitation.  · Mild tricuspid " regurgitation.      CURRENT/PREVIOUS VISIT EKG  Results for orders placed or performed during the hospital encounter of 01/12/24   EKG 12-lead    Collection Time: 01/12/24  9:09 PM    Narrative    Test Reason : R07.9,    Vent. Rate : 099 BPM     Atrial Rate : 111 BPM     P-R Int : 158 ms          QRS Dur : 072 ms      QT Int : 314 ms       P-R-T Axes : 073 074 098 degrees     QTc Int : 402 ms    Sinus tachycardia with 2nd degree A-V block (Mobitz II)  Septal infarct ,age undetermined  Abnormal ECG  When compared with ECG of 04-DEC-2023 15:49,  Sinus rhythm is now with 2nd degree A-V block (Mobitz II)  Septal infarct is now Present    Referred By: [email or web address]   SELF           Confirmed By:            ASSESSMENT/PLAN:     Active Hospital Problems    Diagnosis    *Chest pain    Anxiety    Hypercholesterolemia    Chronic combined systolic and diastolic heart failure    Type 2 diabetes mellitus with hyperglycemia, with long-term current use of insulin    Essential hypertension       ASSESSMENT & PLAN:     NSTEMI  Chest Pain   Chronic HFrEF  Uncontrolled Diabetes Mellitus  Hypertension  Hyperlipidemia        RECOMMENDATIONS:    NSTEMI.  Patient presented with CP, SOB, vomiting after stressful event.  Troponin HS downtrending. No acute ST-T wave changes.  LHC in am to further evaluation for coronary blockages. Discussed with patient the risks and benefits of the procedure including, but not limited to, the following 1:1000 risk of Heart attack, stroke and death with 3-5% Risk of bleeding, vessel damage, and the need for emergent CABG Surgery.  All questions have been answered to patient's satisfaction.  Informed consent obtained  Will keep her nothing by mouth post midnight and proceed with the coronary angiography possible PCI in the morning.   Echo pending further recommendations to follow.  History of HFrEF with EF 35%.  1.5L fluid restriction. 2 g salt restrictions. Daily weights.  Continue aspirin, plavix, and statin  therapy.    Recommend optimizing GDMT.  Start metoprolol succinate 25 mg daily.  Continue lisinopril 10 mg daily.  Continue lasix.  BP soft.  Closely monitor.   Thank you for the consult. We will continue to follow.       Nora Bolivar NP  Department of Cardiology  Date of Service: 01/13/2024

## 2024-01-13 NOTE — ASSESSMENT & PLAN NOTE
Patient was very anxious on arrival to the ED  Patient states that the  were raiding her house which startled her and along with the chest pain made her anxious     Patient received Lorazepam 1 mg IV in the ED and states that this helped  - Continue PRN

## 2024-01-13 NOTE — HPI
69 y/o female with history of CAD, CHF, DM, HTN, anxiety, and hypercholesterolemia presents to the ED for central pressure like chest pain that started just prior to arrival to the ED. Patient states that she was sitting in her home with her  when suddenly the  department burst in and started to execute a search warrant. The patient states that they were not looking for her or her , they were looking for a woman that has been living with them. Patient states that when this happened she became very anxious, started having central pressure like chest pain, and was nauseated/weak and vomited. Patient states that she took a nitroglycerin and her chest pain resolved. She states that her heart was racing and she was shaky and weak so she decided to come to the ED to get checked out. The patient took ASA prior to the EMS arriving and on arrival to the ED her pain, nausea, and vomiting had mostly resolved. Patient denies any radiation of her chest pain, diaphoresis, falls, dizziness, or lightheadedness. Code status was discussed and patient was placed as a full code at this time.     ED: Vitals showed , /60, RR 18 saturating at 96% on RA. Labs were significant for sodium 130, glucose 234, troponin 21.5, and WBC 15.24.

## 2024-01-13 NOTE — SUBJECTIVE & OBJECTIVE
Past Medical History:   Diagnosis Date    Anxiety     CAD (coronary artery disease)     CHF (congestive heart failure)     Chronic low back pain     Diabetes 2/1/2012    Diabetes mellitus     Hyperkalemia 2/13/2022    Hypertension     Leukocytosis 2/13/2022    Metabolic acidosis with respiratory acidosis 2/13/2022       Past Surgical History:   Procedure Laterality Date    CARDIAC DEFIBRILLATOR PLACEMENT  2007    CORONARY ARTERY BYPASS GRAFT  2007    HYSTERECTOMY         Review of patient's allergies indicates:  No Known Allergies    No current facility-administered medications on file prior to encounter.     Current Outpatient Medications on File Prior to Encounter   Medication Sig    aspirin (ECOTRIN) 81 MG EC tablet Take 81 mg by mouth once daily.    azelastine (ASTELIN) 137 mcg (0.1 %) nasal spray 1 spray (137 mcg total) by Nasal route 2 (two) times daily. for 7 days    blood sugar diagnostic (FREESTYLE LITE STRIPS) Strp 1 each by Misc.(Non-Drug; Combo Route) route 4 (four) times daily.    blood-glucose meter kit Use as instructed    clopidogreL (PLAVIX) 75 mg tablet Take 1 tablet (75 mg total) by mouth once daily.    coenzyme Q10 200 mg capsule Take 200 mg by mouth 2 (two) times daily.    EScitalopram oxalate (LEXAPRO) 20 MG tablet TAKE 1/2 TABLET BY MOUTH TWICE DAILY    furosemide (LASIX) 40 MG tablet Take 1 tablet (40 mg total) by mouth 2 (two) times daily.    insulin detemir U-100, Levemir, 100 unit/mL (3 mL) SubQ InPn pen Inject 30 Units into the skin every evening.    lancets (LANCETS,THIN) Misc 1 each by Misc.(Non-Drug; Combo Route) route 4 (four) times daily.    lisinopriL 10 MG tablet Take 1 tablet (10 mg total) by mouth once daily.    metoprolol succinate (TOPROL-XL) 50 MG 24 hr tablet Take 2 tablets (100 mg total) by mouth once daily.    mirtazapine (REMERON) 7.5 MG Tab Take 1 tablet (7.5 mg total) by mouth every evening. (Patient taking differently: Take 7.5 mg by mouth as needed (Sleep).)     "nitroGLYCERIN (NITROSTAT) 0.3 MG SL tablet Place 1 tablet (0.3 mg total) under the tongue every 5 (five) minutes as needed for Chest pain.    ondansetron (ZOFRAN-ODT) 4 MG TbDL Take 1 tablet (4 mg total) by mouth every 8 (eight) hours as needed (nauseau).    pen needle, diabetic (DROPLET PEN NEEDLE) 32 gauge x 5/32" Ndle 1 each by Misc.(Non-Drug; Combo Route) route 3 (three) times daily.    simvastatin (ZOCOR) 40 MG tablet Take 1 tablet (40 mg total) by mouth every evening.     Family History       Problem Relation (Age of Onset)    Cerebral aneurysm Brother    Diabetes Mother    Heart disease Brother    Mental illness Brother          Tobacco Use    Smoking status: Former     Current packs/day: 0.00     Types: Cigarettes     Quit date:      Years since quittin.0    Smokeless tobacco: Never   Substance and Sexual Activity    Alcohol use: No    Drug use: Not on file    Sexual activity: Not on file     Review of Systems   Constitutional:  Negative for activity change, appetite change, chills, diaphoresis, fatigue and fever.   HENT:  Negative for congestion, ear discharge, ear pain, postnasal drip, rhinorrhea, sinus pressure and sore throat.    Eyes:  Negative for pain, discharge, redness and itching.   Respiratory:  Negative for cough, chest tightness and shortness of breath.    Cardiovascular:  Positive for chest pain. Negative for leg swelling.   Gastrointestinal:  Positive for abdominal pain, diarrhea (this morning), nausea and vomiting. Negative for blood in stool and constipation.   Genitourinary:  Negative for dysuria, frequency, hematuria and urgency.   Musculoskeletal:  Negative for back pain.   Skin:  Negative for color change, pallor and rash.   Neurological:  Positive for weakness. Negative for dizziness, syncope, facial asymmetry and light-headedness.   Psychiatric/Behavioral:  Negative for behavioral problems, confusion and hallucinations. The patient is nervous/anxious.      Objective:     Vital " Signs (Most Recent):  Temp: 98.5 °F (36.9 °C) (01/12/24 2050)  Pulse: 107 (01/12/24 2324)  Resp: 18 (01/12/24 2324)  BP: 101/67 (01/12/24 2324)  SpO2: 96 % (01/12/24 2324) Vital Signs (24h Range):  Temp:  [98.5 °F (36.9 °C)] 98.5 °F (36.9 °C)  Pulse:  [107-112] 107  Resp:  [18] 18  SpO2:  [96 %] 96 %  BP: (101-131)/(60-78) 101/67     Weight: 72.6 kg (160 lb)  Body mass index is 29.26 kg/m².     Physical Exam  Vitals and nursing note reviewed.   Constitutional:       General: She is not in acute distress.     Appearance: Normal appearance. She is not ill-appearing, toxic-appearing or diaphoretic.   HENT:      Head: Normocephalic and atraumatic.      Nose: Nose normal.      Mouth/Throat:      Mouth: Mucous membranes are moist.   Eyes:      Extraocular Movements: Extraocular movements intact.   Cardiovascular:      Rate and Rhythm: Tachycardia present.      Heart sounds: Normal heart sounds. No murmur heard.  Pulmonary:      Effort: Pulmonary effort is normal. No respiratory distress.      Breath sounds: Normal breath sounds. No wheezing.   Abdominal:      General: Bowel sounds are normal.      Palpations: Abdomen is soft. There is no mass.      Tenderness: There is no abdominal tenderness. There is no guarding.      Hernia: No hernia is present.   Musculoskeletal:         General: No swelling, tenderness or deformity. Normal range of motion.      Cervical back: Normal range of motion. No rigidity or tenderness.   Skin:     General: Skin is warm and dry.      Coloration: Skin is not jaundiced.      Findings: No bruising or erythema.   Neurological:      General: No focal deficit present.      Mental Status: She is alert and oriented to person, place, and time. Mental status is at baseline.   Psychiatric:         Mood and Affect: Mood normal.         Behavior: Behavior normal.                Significant Labs: All pertinent labs within the past 24 hours have been reviewed.  CBC:   Recent Labs   Lab 01/12/24 2110   WBC  15.24*   HGB 13.7   HCT 43.1        CMP:   Recent Labs   Lab 01/12/24 2110   *   K 4.5   CL 96   CO2 22*   *   BUN 39*   CREATININE 1.2   CALCIUM 9.1   PROT 8.1   ALBUMIN 4.2   BILITOT 0.4   ALKPHOS 48*   AST 15   ALT 16   ANIONGAP 12     Cardiac Markers:   Recent Labs   Lab 01/12/24 2110   BNP 93     Magnesium:   Recent Labs   Lab 01/12/24 2110   MG 1.9     Troponin:   Recent Labs   Lab 01/12/24 2110   TROPONINIHS 21.5*       Significant Imaging: I have reviewed all pertinent imaging results/findings within the past 24 hours.

## 2024-01-13 NOTE — PLAN OF CARE
01/13/24 1039   MUHAMMAD Message   Medicare Outpatient and Observation Notification regarding financial responsibility Given to patient/caregiver;Explained to patient/caregiver;Signed/date by patient/caregiver   Date MUHAMMAD was signed 01/13/24   Time MUHAMMAD was signed 1037

## 2024-01-13 NOTE — ASSESSMENT & PLAN NOTE
"Patient presented to the ED for central, pressure like chest pain that was associated with "shakiness", nausea, vomiting, and generalized weakness   Patient took ASA and nitroglycerin at home ad the chest pain resolved    Troponin 21.5  - repeat troponin pending   BNP 93  ECG: Sinus tachycardia with 2nd degree AV block (Mobitz 2)    Patient was given the following in the ED  - 1 L LR  - Lorazepam  - Zofran   - Protonix     ECHO ordered   TSH ordered   Lipid panel ordered   A1c ordered   Continue to trend troponin     "

## 2024-01-13 NOTE — PHARMACY MED REC
"              .        Admission Medication History     The home medication history was taken by Janett Marshall.    You may go to "Admission" then "Reconcile Home Medications" tabs to review and/or act upon these items.     The home medication list has been updated by the Pharmacy department.   Please read ALL comments highlighted in yellow.   Please address this information as you see fit.    Feel free to contact us if you have any questions or require assistance.      Medications listed below were obtained from: Patient/family and Analytic software- [employer]  No current facility-administered medications on file prior to encounter.     Current Outpatient Medications on File Prior to Encounter   Medication Sig Dispense Refill    aspirin (ECOTRIN) 81 MG EC tablet Take 81 mg by mouth once daily.      azelastine (ASTELIN) 137 mcg (0.1 %) nasal spray 1 spray (137 mcg total) by Nasal route 2 (two) times daily. for 7 days 30 mL 0    clopidogreL (PLAVIX) 75 mg tablet Take 1 tablet (75 mg total) by mouth once daily. 90 tablet 3    coenzyme Q10 200 mg capsule Take 200 mg by mouth 2 (two) times daily.      EScitalopram oxalate (LEXAPRO) 20 MG tablet TAKE 1/2 TABLET BY MOUTH TWICE DAILY (Patient taking differently: Take 10 mg by mouth 2 (two) times a day. TAKE 1/2 TABLET BY MOUTH TWICE DAILY) 90 tablet 0    furosemide (LASIX) 40 MG tablet Take 1 tablet (40 mg total) by mouth 2 (two) times daily. 180 tablet 2    insulin detemir U-100, Levemir, 100 unit/mL (3 mL) SubQ InPn pen Inject 30 Units into the skin every evening. 9 mL 11    lisinopriL 10 MG tablet Take 1 tablet (10 mg total) by mouth once daily. 90 tablet 3    metoprolol succinate (TOPROL-XL) 50 MG 24 hr tablet Take 2 tablets (100 mg total) by mouth once daily. 180 tablet 3    mirtazapine (REMERON) 7.5 MG Tab Take 1 tablet (7.5 mg total) by mouth every evening. (Patient taking differently: Take 7.5 mg by mouth as needed (Sleep).) 30 tablet 11    nitroGLYCERIN " "(NITROSTAT) 0.3 MG SL tablet Place 1 tablet (0.3 mg total) under the tongue every 5 (five) minutes as needed for Chest pain. 25 tablet 12    omega-3 fatty acids/fish oil (FISH OIL-OMEGA-3 FATTY ACIDS) 300-1,000 mg capsule Take 1 capsule by mouth once daily.      ondansetron (ZOFRAN-ODT) 4 MG TbDL Take 1 tablet (4 mg total) by mouth every 8 (eight) hours as needed (nauseau). 30 tablet 0    potassium citrate 99 mg Cap Take 1 capsule by mouth once daily.      simvastatin (ZOCOR) 40 MG tablet Take 1 tablet (40 mg total) by mouth every evening. 90 tablet 3    blood sugar diagnostic (FREESTYLE LITE STRIPS) Strp 1 each by Misc.(Non-Drug; Combo Route) route 4 (four) times daily. 200 each 0    blood-glucose meter kit Use as instructed 1 each 0    lancets (LANCETS,THIN) Misc 1 each by Misc.(Non-Drug; Combo Route) route 4 (four) times daily. 200 each 0    pen needle, diabetic (DROPLET PEN NEEDLE) 32 gauge x 5/32" Ndle 1 each by Misc.(Non-Drug; Combo Route) route 3 (three) times daily. 90 each 3           Janett Marshall  EXT 1924      "

## 2024-01-13 NOTE — ASSESSMENT & PLAN NOTE
Patient has chronic combined CHF which is currently controlled   Last ECHO (2/13/2022)  The estimated ejection fraction is 35%.  The left ventricle is normal in size with mild concentric hypertrophy and moderately decreased systolic function.  Grade II left ventricular diastolic dysfunction.  Moderate left atrial enlargement.  There is moderate left ventricular global hypokinesis.  Mild right atrial enlargement.  Mild mitral regurgitation.  Mild tricuspid regurgitation.    Continue home regimen   Continue to monitor   - strict Is and Os   - Telemetry   - daily weights   BNP 93    ECHO ordered

## 2024-01-13 NOTE — PLAN OF CARE
[facility] - Emergency Dept  Initial Discharge Assessment       Primary Care Provider: Natan Marie MD    Admission Diagnosis: Anxiety [F41.9]    Admission Date: 1/12/2024  Expected Discharge Date:     Transition of Care Barriers: None  Spoke to pot at bedside.. pt intends to discharge home where she lives with her  and has the ability to complete adl's with the use of home o2.   No needs identified at this time.  Payor: [employer] MANAGED MEDICARE / Plan: [email or web address] HMO PPO SPECIAL NEEDS / Product Type: Medicare Advantage /     Extended Emergency Contact Information  Primary Emergency Contact: Loi Steen  Address: [address] [place] [address] [facility]  Home Phone: [phone]  Mobile Phone: [phone]  Relation: Spouse    Discharge Plan A: Home  Discharge Plan B: Home with family      [id number] - [place] - [address]  [address]  [place] [address]  Phone: [phone] Fax: [phone]      Initial Assessment (most recent)       Adult Discharge Assessment - 01/13/24 1044          Discharge Assessment    Assessment Type Discharge Planning Assessment     Confirmed/corrected address, phone number and insurance Yes     Confirmed Demographics Correct on Facesheet     Source of Information patient     People in Home spouse     Prior to hospitilization cognitive status: Alert/Oriented     Current cognitive status: Alert/Oriented     Equipment Currently Used at Home cane, straight;oxygen     Readmission within 30 days? No     Patient currently being followed by outpatient case management? No     Do you currently have service(s) that help you manage your care at home? No     Do you take prescription medications? Yes     Do you have prescription coverage? Yes     Coverage [employer]     Do you have any problems affording any of your prescribed medications? No     Is the patient taking  medications as prescribed? yes     Who is going to help you get home at discharge? family     How do you get to doctors appointments? car, drives self     Are you on dialysis? No     Do you take coumadin? No     Discharge Plan A Home     Discharge Plan B Home with family     DME Needed Upon Discharge  none     Discharge Plan discussed with: Patient     Transition of Care Barriers None

## 2024-01-13 NOTE — ASSESSMENT & PLAN NOTE
Continue home regimen as tolerated   - Hold home Metoprolol due to Mobitz 2 block seen on ECG  Continue to monitor vitals

## 2024-01-13 NOTE — H&P
[facility] - Emergency Dept  Hospital Medicine  History & Physical    Patient Name: Celine Steen  MRN: [id number]  Patient Class: OP- Observation  Admission Date: 1/12/2024  Attending Physician:  Dr. Marc  Primary Care Provider: Natan Marie MD         Patient information was obtained from patient, relative(s), and ER records.     Subjective:     Principal Problem:Chest pain    Chief Complaint:   Chief Complaint   Patient presents with    Chest Pain     Pt arrived by ems, pt states she began with chest pain, nausea and vomiting followed. Pt states after her nitro pain subsided. Pt states it may be anxiety but she does have a cardiac hx. Upon arrival to ed pt states she currently has no chest pain         HPI: 69 y/o female with history of CAD, CHF, DM, HTN, anxiety, and hypercholesterolemia presents to the ED for central pressure like chest pain that started just prior to arrival to the ED. Patient states that she was sitting in her home with her  when suddenly the  department burst in and started to execute a search warrant. The patient states that they were not looking for her or her , they were looking for a woman that has been living with them. Patient states that when this happened she became very anxious, started having central pressure like chest pain, and was nauseated/weak and vomited. Patient states that she took a nitroglycerin and her chest pain resolved. She states that her heart was racing and she was shaky and weak so she decided to come to the ED to get checked out. The patient took ASA prior to the EMS arriving and on arrival to the ED her pain, nausea, and vomiting had mostly resolved. Patient denies any radiation of her chest pain, diaphoresis, falls, dizziness, or lightheadedness. Code status was discussed and patient was placed as a full code at this time.     ED: Vitals showed , /60, RR 18 saturating at 96% on RA. Labs were significant  for sodium 130, glucose 234, troponin 21.5, and WBC 15.24.     Past Medical History:   Diagnosis Date    Anxiety     CAD (coronary artery disease)     CHF (congestive heart failure)     Chronic low back pain     Diabetes 2/1/2012    Diabetes mellitus     Hyperkalemia 2/13/2022    Hypertension     Leukocytosis 2/13/2022    Metabolic acidosis with respiratory acidosis 2/13/2022       Past Surgical History:   Procedure Laterality Date    CARDIAC DEFIBRILLATOR PLACEMENT  2007    CORONARY ARTERY BYPASS GRAFT  2007    HYSTERECTOMY         Review of patient's allergies indicates:  No Known Allergies    No current facility-administered medications on file prior to encounter.     Current Outpatient Medications on File Prior to Encounter   Medication Sig    aspirin (ECOTRIN) 81 MG EC tablet Take 81 mg by mouth once daily.    azelastine (ASTELIN) 137 mcg (0.1 %) nasal spray 1 spray (137 mcg total) by Nasal route 2 (two) times daily. for 7 days    blood sugar diagnostic (FREESTYLE LITE STRIPS) Strp 1 each by Misc.(Non-Drug; Combo Route) route 4 (four) times daily.    blood-glucose meter kit Use as instructed    clopidogreL (PLAVIX) 75 mg tablet Take 1 tablet (75 mg total) by mouth once daily.    coenzyme Q10 200 mg capsule Take 200 mg by mouth 2 (two) times daily.    EScitalopram oxalate (LEXAPRO) 20 MG tablet TAKE 1/2 TABLET BY MOUTH TWICE DAILY    furosemide (LASIX) 40 MG tablet Take 1 tablet (40 mg total) by mouth 2 (two) times daily.    insulin detemir U-100, Levemir, 100 unit/mL (3 mL) SubQ InPn pen Inject 30 Units into the skin every evening.    lancets (LANCETS,THIN) Misc 1 each by Misc.(Non-Drug; Combo Route) route 4 (four) times daily.    lisinopriL 10 MG tablet Take 1 tablet (10 mg total) by mouth once daily.    metoprolol succinate (TOPROL-XL) 50 MG 24 hr tablet Take 2 tablets (100 mg total) by mouth once daily.    mirtazapine (REMERON) 7.5 MG Tab Take 1 tablet (7.5 mg total) by mouth every evening. (Patient taking  "differently: Take 7.5 mg by mouth as needed (Sleep).)    nitroGLYCERIN (NITROSTAT) 0.3 MG SL tablet Place 1 tablet (0.3 mg total) under the tongue every 5 (five) minutes as needed for Chest pain.    ondansetron (ZOFRAN-ODT) 4 MG TbDL Take 1 tablet (4 mg total) by mouth every 8 (eight) hours as needed (nauseau).    pen needle, diabetic (DROPLET PEN NEEDLE) 32 gauge x 5/32" Ndle 1 each by Misc.(Non-Drug; Combo Route) route 3 (three) times daily.    simvastatin (ZOCOR) 40 MG tablet Take 1 tablet (40 mg total) by mouth every evening.     Family History       Problem Relation (Age of Onset)    Cerebral aneurysm Brother    Diabetes Mother    Heart disease Brother    Mental illness Brother          Tobacco Use    Smoking status: Former     Current packs/day: 0.00     Types: Cigarettes     Quit date:      Years since quittin.0    Smokeless tobacco: Never   Substance and Sexual Activity    Alcohol use: No    Drug use: Not on file    Sexual activity: Not on file     Review of Systems   Constitutional:  Negative for activity change, appetite change, chills, diaphoresis, fatigue and fever.   HENT:  Negative for congestion, ear discharge, ear pain, postnasal drip, rhinorrhea, sinus pressure and sore throat.    Eyes:  Negative for pain, discharge, redness and itching.   Respiratory:  Negative for cough, chest tightness and shortness of breath.    Cardiovascular:  Positive for chest pain. Negative for leg swelling.   Gastrointestinal:  Positive for abdominal pain, diarrhea (this morning), nausea and vomiting. Negative for blood in stool and constipation.   Genitourinary:  Negative for dysuria, frequency, hematuria and urgency.   Musculoskeletal:  Negative for back pain.   Skin:  Negative for color change, pallor and rash.   Neurological:  Positive for weakness. Negative for dizziness, syncope, facial asymmetry and light-headedness.   Psychiatric/Behavioral:  Negative for behavioral problems, confusion and hallucinations. " The patient is nervous/anxious.      Objective:     Vital Signs (Most Recent):  Temp: 98.5 °F (36.9 °C) (01/12/24 2050)  Pulse: 107 (01/12/24 2324)  Resp: 18 (01/12/24 2324)  BP: 101/67 (01/12/24 2324)  SpO2: 96 % (01/12/24 2324) Vital Signs (24h Range):  Temp:  [98.5 °F (36.9 °C)] 98.5 °F (36.9 °C)  Pulse:  [107-112] 107  Resp:  [18] 18  SpO2:  [96 %] 96 %  BP: (101-131)/(60-78) 101/67     Weight: 72.6 kg (160 lb)  Body mass index is 29.26 kg/m².     Physical Exam  Vitals and nursing note reviewed.   Constitutional:       General: She is not in acute distress.     Appearance: Normal appearance. She is not ill-appearing, toxic-appearing or diaphoretic.   HENT:      Head: Normocephalic and atraumatic.      Nose: Nose normal.      Mouth/Throat:      Mouth: Mucous membranes are moist.   Eyes:      Extraocular Movements: Extraocular movements intact.   Cardiovascular:      Rate and Rhythm: Tachycardia present.      Heart sounds: Normal heart sounds. No murmur heard.  Pulmonary:      Effort: Pulmonary effort is normal. No respiratory distress.      Breath sounds: Normal breath sounds. No wheezing.   Abdominal:      General: Bowel sounds are normal.      Palpations: Abdomen is soft. There is no mass.      Tenderness: There is no abdominal tenderness. There is no guarding.      Hernia: No hernia is present.   Musculoskeletal:         General: No swelling, tenderness or deformity. Normal range of motion.      Cervical back: Normal range of motion. No rigidity or tenderness.   Skin:     General: Skin is warm and dry.      Coloration: Skin is not jaundiced.      Findings: No bruising or erythema.   Neurological:      General: No focal deficit present.      Mental Status: She is alert and oriented to person, place, and time. Mental status is at baseline.   Psychiatric:         Mood and Affect: Mood normal.         Behavior: Behavior normal.                Significant Labs: All pertinent labs within the past 24 hours have been  "reviewed.  CBC:   Recent Labs   Lab 01/12/24 2110   WBC 15.24*   HGB 13.7   HCT 43.1        CMP:   Recent Labs   Lab 01/12/24 2110   *   K 4.5   CL 96   CO2 22*   *   BUN 39*   CREATININE 1.2   CALCIUM 9.1   PROT 8.1   ALBUMIN 4.2   BILITOT 0.4   ALKPHOS 48*   AST 15   ALT 16   ANIONGAP 12     Cardiac Markers:   Recent Labs   Lab 01/12/24 2110   BNP 93     Magnesium:   Recent Labs   Lab 01/12/24 2110   MG 1.9     Troponin:   Recent Labs   Lab 01/12/24 2110   TROPONINIHS 21.5*       Significant Imaging: I have reviewed all pertinent imaging results/findings within the past 24 hours.  Assessment/Plan:     * Chest pain  Patient presented to the ED for central, pressure like chest pain that was associated with "shakiness", nausea, vomiting, and generalized weakness   Patient took ASA and nitroglycerin at home ad the chest pain resolved    Troponin 21.5  - repeat troponin pending   BNP 93  ECG: Sinus tachycardia with 2nd degree AV block (Mobitz 2)    Patient was given the following in the ED  - 1 L LR  - Lorazepam  - Zofran   - Protonix     ECHO ordered   TSH ordered   Lipid panel ordered   A1c ordered   Continue to trend troponin       Anxiety  Patient was very anxious on arrival to the ED  Patient states that the  were raiding her house which startled her and along with the chest pain made her anxious     Patient received Lorazepam 1 mg IV in the ED and states that this helped  - Continue PRN    Hypercholesterolemia  Continue home statin       Chronic combined systolic and diastolic heart failure  Patient has chronic combined CHF which is currently controlled   Last ECHO (2/13/2022)  The estimated ejection fraction is 35%.  The left ventricle is normal in size with mild concentric hypertrophy and moderately decreased systolic function.  Grade II left ventricular diastolic dysfunction.  Moderate left atrial enlargement.  There is moderate left ventricular global hypokinesis.  Mild right " atrial enlargement.  Mild mitral regurgitation.  Mild tricuspid regurgitation.    Continue home regimen   Continue to monitor   - strict Is and Os   - Telemetry   - daily weights   BNP 93    ECHO ordered     Type 2 diabetes mellitus with hyperglycemia, with long-term current use of insulin  Diabetic diet   Hold all home oral glucose lowering medications   Home insulin regimen ordered   Sliding scale insulin ordered   Continue to monitor blood glucose with POC glucose checks       Essential hypertension  Continue home regimen as tolerated   - Hold home Metoprolol due to Mobitz 2 block seen on ECG  Continue to monitor vitals       VTE Risk Mitigation (From admission, onward)           Ordered     enoxaparin injection 40 mg  Daily         01/12/24 2334     IP VTE HIGH RISK PATIENT  Once         01/12/24 2334     Place sequential compression device  Until discontinued         01/12/24 2334                         On 01/13/2024, patient should be placed in hospital observation services under my care in collaboration with .           NA MonrealC  Department of Hospital Medicine  [facility] - Emergency Dept

## 2024-01-14 LAB
ANION GAP SERPL CALC-SCNC: 10 MMOL/L (ref 8–16)
AORTIC ROOT ANNULUS: 3.3 CM
AORTIC VALVE CUSP SEPERATION: 2.2 CM
APTT PPP: 38.2 SEC (ref 21–32)
ASCENDING AORTA: 3.4 CM
AV INDEX (PROSTH): 0.67
AV MEAN GRADIENT: 3 MMHG
AV PEAK GRADIENT: 6 MMHG
AV VALVE AREA BY VELOCITY RATIO: 2.13 CM²
AV VALVE AREA: 2.31 CM²
AV VELOCITY RATIO: 0.62
BASOPHILS # BLD AUTO: 0.05 K/UL (ref 0–0.2)
BASOPHILS NFR BLD: 0.5 % (ref 0–1.9)
BSA FOR ECHO PROCEDURE: 1.78 M2
BUN SERPL-MCNC: 27 MG/DL (ref 8–23)
CALCIUM SERPL-MCNC: 9.2 MG/DL (ref 8.7–10.5)
CHLORIDE SERPL-SCNC: 102 MMOL/L (ref 95–110)
CO2 SERPL-SCNC: 23 MMOL/L (ref 23–29)
CREAT SERPL-MCNC: 1 MG/DL (ref 0.5–1.4)
CV ECHO LV RWT: 0.45 CM
DIFFERENTIAL METHOD BLD: ABNORMAL
DOP CALC AO PEAK VEL: 1.2 M/S
DOP CALC AO VTI: 19.9 CM
DOP CALC LVOT AREA: 3.5 CM2
DOP CALC LVOT DIAMETER: 2.1 CM
DOP CALC LVOT PEAK VEL: 0.74 M/S
DOP CALC LVOT STROKE VOLUME: 46.04 CM3
DOP CALC MV VTI: 22.8 CM
DOP CALCLVOT PEAK VEL VTI: 13.3 CM
E WAVE DECELERATION TIME: 132 MSEC
E/A RATIO: 0.94
E/E' RATIO: 13.33 M/S
ECHO LV POSTERIOR WALL: 0.97 CM (ref 0.6–1.1)
EJECTION FRACTION: 35 %
EOSINOPHIL # BLD AUTO: 0.5 K/UL (ref 0–0.5)
EOSINOPHIL NFR BLD: 4.9 % (ref 0–8)
ERYTHROCYTE [DISTWIDTH] IN BLOOD BY AUTOMATED COUNT: 13.7 % (ref 11.5–14.5)
EST. GFR  (NO RACE VARIABLE): >60 ML/MIN/1.73 M^2
FRACTIONAL SHORTENING: 21 % (ref 28–44)
GLUCOSE SERPL-MCNC: 142 MG/DL (ref 70–110)
GLUCOSE SERPL-MCNC: 145 MG/DL (ref 70–110)
GLUCOSE SERPL-MCNC: 153 MG/DL (ref 70–110)
GLUCOSE SERPL-MCNC: 245 MG/DL (ref 70–110)
GLUCOSE SERPL-MCNC: 283 MG/DL (ref 70–110)
HCT VFR BLD AUTO: 38 % (ref 37–48.5)
HGB BLD-MCNC: 12.3 G/DL (ref 12–16)
IMM GRANULOCYTES # BLD AUTO: 0.08 K/UL (ref 0–0.04)
IMM GRANULOCYTES NFR BLD AUTO: 0.7 % (ref 0–0.5)
INTERVENTRICULAR SEPTUM: 0.85 CM (ref 0.6–1.1)
IVC DIAMETER: 1.46 CM
LEFT ATRIUM VOLUME INDEX MOD: 31.3 ML/M2
LEFT ATRIUM VOLUME MOD: 54.5 CM3
LEFT INTERNAL DIMENSION IN SYSTOLE: 3.43 CM (ref 2.1–4)
LEFT VENTRICLE DIASTOLIC VOLUME INDEX: 48.79 ML/M2
LEFT VENTRICLE DIASTOLIC VOLUME: 84.9 ML
LEFT VENTRICLE MASS INDEX: 73 G/M2
LEFT VENTRICLE SYSTOLIC VOLUME INDEX: 27.9 ML/M2
LEFT VENTRICLE SYSTOLIC VOLUME: 48.5 ML
LEFT VENTRICULAR INTERNAL DIMENSION IN DIASTOLE: 4.34 CM (ref 3.5–6)
LEFT VENTRICULAR MASS: 127.06 G
LV LATERAL E/E' RATIO: 12.5 M/S
LV SEPTAL E/E' RATIO: 14.29 M/S
LVOT MG: 1 MMHG
LVOT MV: 0.52 CM/S
LYMPHOCYTES # BLD AUTO: 3.6 K/UL (ref 1–4.8)
LYMPHOCYTES NFR BLD: 32.9 % (ref 18–48)
MCH RBC QN AUTO: 28.1 PG (ref 27–31)
MCHC RBC AUTO-ENTMCNC: 32.4 G/DL (ref 32–36)
MCV RBC AUTO: 87 FL (ref 82–98)
MONOCYTES # BLD AUTO: 1.5 K/UL (ref 0.3–1)
MONOCYTES NFR BLD: 13.7 % (ref 4–15)
MV MEAN GRADIENT: 3 MMHG
MV PEAK A VEL: 1.06 M/S
MV PEAK E VEL: 1 M/S
MV PEAK GRADIENT: 4 MMHG
MV STENOSIS PRESSURE HALF TIME: 68 MS
MV VALVE AREA BY CONTINUITY EQUATION: 2.02 CM2
MV VALVE AREA P 1/2 METHOD: 3.24 CM2
NEUTROPHILS # BLD AUTO: 5.2 K/UL (ref 1.8–7.7)
NEUTROPHILS NFR BLD: 47.3 % (ref 38–73)
NRBC BLD-RTO: 0 /100 WBC
OHS LV EJECTION FRACTION SIMPSONS BIPLANE MOD: 38 %
PISA TR MAX VEL: 2.72 M/S
PLATELET # BLD AUTO: 330 K/UL (ref 150–450)
PMV BLD AUTO: 10.3 FL (ref 9.2–12.9)
POTASSIUM SERPL-SCNC: 4.2 MMOL/L (ref 3.5–5.1)
PV MV: 0.77 M/S
PV PEAK GRADIENT: 5 MMHG
PV PEAK VELOCITY: 1.12 M/S
RA PRESSURE ESTIMATED: 3 MMHG
RBC # BLD AUTO: 4.37 M/UL (ref 4–5.4)
RV TB RVSP: 6 MMHG
RV TISSUE DOPPLER FREE WALL SYSTOLIC VELOCITY 1 (APICAL 4 CHAMBER VIEW): 8.49 CM/S
SINUS: 3.1 CM
SODIUM SERPL-SCNC: 135 MMOL/L (ref 136–145)
STJ: 2.75 CM
TDI LATERAL: 0.08 M/S
TDI SEPTAL: 0.07 M/S
TDI: 0.08 M/S
TR MAX PG: 30 MMHG
TRICUSPID ANNULAR PLANE SYSTOLIC EXCURSION: 1.43 CM
TV REST PULMONARY ARTERY PRESSURE: 33 MMHG
WBC # BLD AUTO: 10.88 K/UL (ref 3.9–12.7)
Z-SCORE OF LEFT VENTRICULAR DIMENSION IN END DIASTOLE: -1.04
Z-SCORE OF LEFT VENTRICULAR DIMENSION IN END SYSTOLE: 1.11

## 2024-01-14 PROCEDURE — 99900031 HC PATIENT EDUCATION (STAT)

## 2024-01-14 PROCEDURE — 93005 ELECTROCARDIOGRAM TRACING: CPT | Performed by: GENERAL PRACTICE

## 2024-01-14 PROCEDURE — 25000003 PHARM REV CODE 250: Performed by: INTERNAL MEDICINE

## 2024-01-14 PROCEDURE — 25000003 PHARM REV CODE 250: Performed by: STUDENT IN AN ORGANIZED HEALTH CARE EDUCATION/TRAINING PROGRAM

## 2024-01-14 PROCEDURE — 63600175 PHARM REV CODE 636 W HCPCS

## 2024-01-14 PROCEDURE — 99900035 HC TECH TIME PER 15 MIN (STAT)

## 2024-01-14 PROCEDURE — 99152 MOD SED SAME PHYS/QHP 5/>YRS: CPT | Performed by: STUDENT IN AN ORGANIZED HEALTH CARE EDUCATION/TRAINING PROGRAM

## 2024-01-14 PROCEDURE — C1894 INTRO/SHEATH, NON-LASER: HCPCS | Performed by: STUDENT IN AN ORGANIZED HEALTH CARE EDUCATION/TRAINING PROGRAM

## 2024-01-14 PROCEDURE — 21400001 HC TELEMETRY ROOM

## 2024-01-14 PROCEDURE — C1760 CLOSURE DEV, VASC: HCPCS | Performed by: STUDENT IN AN ORGANIZED HEALTH CARE EDUCATION/TRAINING PROGRAM

## 2024-01-14 PROCEDURE — 25000003 PHARM REV CODE 250

## 2024-01-14 PROCEDURE — 85730 THROMBOPLASTIN TIME PARTIAL: CPT | Performed by: INTERNAL MEDICINE

## 2024-01-14 PROCEDURE — 80048 BASIC METABOLIC PNL TOTAL CA: CPT

## 2024-01-14 PROCEDURE — 94761 N-INVAS EAR/PLS OXIMETRY MLT: CPT

## 2024-01-14 PROCEDURE — 99153 MOD SED SAME PHYS/QHP EA: CPT | Performed by: STUDENT IN AN ORGANIZED HEALTH CARE EDUCATION/TRAINING PROGRAM

## 2024-01-14 PROCEDURE — 99152 MOD SED SAME PHYS/QHP 5/>YRS: CPT | Mod: ,,, | Performed by: STUDENT IN AN ORGANIZED HEALTH CARE EDUCATION/TRAINING PROGRAM

## 2024-01-14 PROCEDURE — 93459 L HRT ART/GRFT ANGIO: CPT | Mod: 26,,, | Performed by: STUDENT IN AN ORGANIZED HEALTH CARE EDUCATION/TRAINING PROGRAM

## 2024-01-14 PROCEDURE — B2111ZZ FLUOROSCOPY OF MULTIPLE CORONARY ARTERIES USING LOW OSMOLAR CONTRAST: ICD-10-PCS | Performed by: STUDENT IN AN ORGANIZED HEALTH CARE EDUCATION/TRAINING PROGRAM

## 2024-01-14 PROCEDURE — 85025 COMPLETE CBC W/AUTO DIFF WBC: CPT

## 2024-01-14 PROCEDURE — C1769 GUIDE WIRE: HCPCS | Performed by: STUDENT IN AN ORGANIZED HEALTH CARE EDUCATION/TRAINING PROGRAM

## 2024-01-14 PROCEDURE — 63600175 PHARM REV CODE 636 W HCPCS: Performed by: STUDENT IN AN ORGANIZED HEALTH CARE EDUCATION/TRAINING PROGRAM

## 2024-01-14 PROCEDURE — 93459 L HRT ART/GRFT ANGIO: CPT | Performed by: STUDENT IN AN ORGANIZED HEALTH CARE EDUCATION/TRAINING PROGRAM

## 2024-01-14 PROCEDURE — 93010 ELECTROCARDIOGRAM REPORT: CPT | Mod: ,,, | Performed by: GENERAL PRACTICE

## 2024-01-14 PROCEDURE — 4A023N7 MEASUREMENT OF CARDIAC SAMPLING AND PRESSURE, LEFT HEART, PERCUTANEOUS APPROACH: ICD-10-PCS | Performed by: STUDENT IN AN ORGANIZED HEALTH CARE EDUCATION/TRAINING PROGRAM

## 2024-01-14 PROCEDURE — 25500020 PHARM REV CODE 255: Performed by: STUDENT IN AN ORGANIZED HEALTH CARE EDUCATION/TRAINING PROGRAM

## 2024-01-14 PROCEDURE — 27201423 OPTIME MED/SURG SUP & DEVICES STERILE SUPPLY: Performed by: STUDENT IN AN ORGANIZED HEALTH CARE EDUCATION/TRAINING PROGRAM

## 2024-01-14 PROCEDURE — 36415 COLL VENOUS BLD VENIPUNCTURE: CPT | Performed by: INTERNAL MEDICINE

## 2024-01-14 DEVICE — ANGIO-SEAL VIP VASCULAR CLOSURE DEVICE
Type: IMPLANTABLE DEVICE | Site: GROIN | Status: FUNCTIONAL
Brand: ANGIO-SEAL

## 2024-01-14 RX ORDER — LIDOCAINE HYDROCHLORIDE 10 MG/ML
INJECTION, SOLUTION EPIDURAL; INFILTRATION; INTRACAUDAL; PERINEURAL
Status: DISCONTINUED | OUTPATIENT
Start: 2024-01-14 | End: 2024-01-14 | Stop reason: HOSPADM

## 2024-01-14 RX ORDER — FENTANYL CITRATE 50 UG/ML
INJECTION, SOLUTION INTRAMUSCULAR; INTRAVENOUS
Status: DISCONTINUED | OUTPATIENT
Start: 2024-01-14 | End: 2024-01-14 | Stop reason: HOSPADM

## 2024-01-14 RX ORDER — MIDAZOLAM HYDROCHLORIDE 1 MG/ML
INJECTION, SOLUTION INTRAMUSCULAR; INTRAVENOUS
Status: DISCONTINUED | OUTPATIENT
Start: 2024-01-14 | End: 2024-01-14 | Stop reason: HOSPADM

## 2024-01-14 RX ORDER — RANOLAZINE 500 MG/1
500 TABLET, EXTENDED RELEASE ORAL 2 TIMES DAILY
Status: DISCONTINUED | OUTPATIENT
Start: 2024-01-14 | End: 2024-01-15 | Stop reason: HOSPADM

## 2024-01-14 RX ADMIN — HEPARIN SODIUM 16 UNITS/KG/HR: 10000 INJECTION, SOLUTION INTRAVENOUS at 07:01

## 2024-01-14 RX ADMIN — CLOPIDOGREL BISULFATE 75 MG: 75 TABLET, FILM COATED ORAL at 08:01

## 2024-01-14 RX ADMIN — LORAZEPAM 1 MG: 2 INJECTION INTRAMUSCULAR; INTRAVENOUS at 09:01

## 2024-01-14 RX ADMIN — INSULIN ASPART 1 UNITS: 100 INJECTION, SOLUTION INTRAVENOUS; SUBCUTANEOUS at 08:01

## 2024-01-14 RX ADMIN — ATORVASTATIN CALCIUM 20 MG: 20 TABLET, FILM COATED ORAL at 08:01

## 2024-01-14 RX ADMIN — FUROSEMIDE 40 MG: 40 TABLET ORAL at 05:01

## 2024-01-14 RX ADMIN — ESCITALOPRAM OXALATE 10 MG: 10 TABLET ORAL at 08:01

## 2024-01-14 RX ADMIN — VANCOMYCIN HYDROCHLORIDE 125 MG: KIT at 05:01

## 2024-01-14 RX ADMIN — INSULIN DETEMIR 30 UNITS: 100 INJECTION, SOLUTION SUBCUTANEOUS at 08:01

## 2024-01-14 RX ADMIN — RANOLAZINE 500 MG: 500 TABLET, FILM COATED, EXTENDED RELEASE ORAL at 08:01

## 2024-01-14 RX ADMIN — ASPIRIN 81 MG: 81 TABLET, COATED ORAL at 08:01

## 2024-01-14 RX ADMIN — INSULIN ASPART 3 UNITS: 100 INJECTION, SOLUTION INTRAVENOUS; SUBCUTANEOUS at 05:01

## 2024-01-14 RX ADMIN — DIPHENHYDRAMINE HYDROCHLORIDE 50 MG: 25 CAPSULE ORAL at 08:01

## 2024-01-14 RX ADMIN — VANCOMYCIN HYDROCHLORIDE 125 MG: KIT at 12:01

## 2024-01-14 RX ADMIN — ACETAMINOPHEN 650 MG: 325 TABLET ORAL at 08:01

## 2024-01-14 RX ADMIN — VANCOMYCIN HYDROCHLORIDE 125 MG: KIT at 02:01

## 2024-01-14 RX ADMIN — VANCOMYCIN HYDROCHLORIDE 125 MG: KIT at 07:01

## 2024-01-14 NOTE — SUBJECTIVE & OBJECTIVE
Objective:     Vital Signs (Most Recent):  Temp: 97.3 °F (36.3 °C) (01/14/24 0747)  Pulse: 66 (01/14/24 0747)  Resp: 20 (01/14/24 0747)  BP: (!) 117/56 (01/14/24 0747)  SpO2: 97 % (01/14/24 0857) Vital Signs (24h Range):  Temp:  [97.3 °F (36.3 °C)-98.3 °F (36.8 °C)] 97.3 °F (36.3 °C)  Pulse:  [66-95] 66  Resp:  [18-22] 20  SpO2:  [94 %-97 %] 97 %  BP: ()/(45-82) 117/56     Weight: 76.4 kg (168 lb 6.9 oz)  Body mass index is 30.81 kg/m².    Intake/Output Summary (Last 24 hours) at 1/14/2024 1348  Last data filed at 1/13/2024 1646  Gross per 24 hour   Intake 240 ml   Output --   Net 240 ml         Physical Exam  Constitutional:       General: She is not in acute distress.  HENT:      Head: Normocephalic and atraumatic.      Nose: No congestion.   Eyes:      General: No scleral icterus.        Right eye: No discharge.         Left eye: No discharge.      Extraocular Movements: Extraocular movements intact.   Cardiovascular:      Rate and Rhythm: Normal rate and regular rhythm.   Pulmonary:      Effort: Pulmonary effort is normal.      Breath sounds: Normal breath sounds.   Abdominal:      General: Abdomen is flat. Bowel sounds are normal.   Musculoskeletal:         General: No swelling or tenderness.      Cervical back: Normal range of motion and neck supple. No rigidity.   Skin:     General: Skin is warm.   Neurological:      General: No focal deficit present.      Mental Status: She is alert and oriented to person, place, and time.   Psychiatric:         Mood and Affect: Mood normal.             Significant Labs: All pertinent labs within the past 24 hours have been reviewed.  CBC:   Recent Labs   Lab 01/12/24 2110 01/13/24  0326 01/14/24  0540   WBC 15.24* 13.00* 10.88   HGB 13.7 12.1 12.3   HCT 43.1 37.2 38.0    323 330     CMP:   Recent Labs   Lab 01/12/24 2110 01/13/24  0326 01/14/24  0540   * 136 135*   K 4.5 4.3 4.2   CL 96 102 102   CO2 22* 25 23   * 144* 153*   BUN 39* 32*  27*   CREATININE 1.2 1.1 1.0   CALCIUM 9.1 9.3 9.2   PROT 8.1  --   --    ALBUMIN 4.2  --   --    BILITOT 0.4  --   --    ALKPHOS 48*  --   --    AST 15  --   --    ALT 16  --   --    ANIONGAP 12 9 10     Cardiac Markers:   Recent Labs   Lab 01/12/24  2110   BNP 93       Significant Imaging: I have reviewed all pertinent imaging results/findings within the past 24 hours.

## 2024-01-14 NOTE — CARE UPDATE
505pm CM sent message to Dr. Hyde and request inpatient order. CM will follow    Rec'd and placed inpatient order.

## 2024-01-14 NOTE — ASSESSMENT & PLAN NOTE
"Patient presented to the ED for central, pressure like chest pain that was associated with "shakiness", nausea, vomiting, and generalized weakness   Patient took ASA and nitroglycerin at home ad the chest pain resolved      - Lorazepam  - Zofran   - Protonix     ECHO ordered   We will have angiogram today  "

## 2024-01-14 NOTE — HOSPITAL COURSE
Patient admitted to the hospital for chest pain.  Cardiology consult.  Patient had angiogram.  Results as belowed. from Cardiology standpoint the patient can be discharged home and follow up with them outpatient    Cardiology recommendation  Angiogram showed the 2 vein grafts are occluded and LIMA to LAD is atretic.  of proximal RCA with collaterals. 60% proximal LAD disease. 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 Distal LCx also has disease. The patient want to pursue medical management at this time. Will optimize medical therapy. PCI can be considered in the future.   Added Ranexa to the regimen.  Follow up with primary cardiologist outpatient.

## 2024-01-14 NOTE — NURSING
Nurses Note -- 4 Eyes      1/13/24   1030 PM      Skin assessed during: Admit      [x] No Altered Skin Integrity Present    [x]Prevention Measures Documented      [] Yes- Altered Skin Integrity Present or Discovered   [] LDA Added if Not in Epic (Describe Wound)   [] New Altered Skin Integrity was Present on Admit and Documented in LDA   [] Wound Image Taken    Wound Care Consulted? No    Attending Nurse:  Mario Lange RN/Staff Member:   [id number]

## 2024-01-14 NOTE — PROGRESS NOTES
[facility] Medicine  Progress Note    Patient Name: Celine Steen  MRN: [id number]  Patient Class: IP- Inpatient   Admission Date: 1/12/2024  Length of Stay: 1 days  Attending Physician: Michael Hyde MD  Primary Care Provider: Natan Marie MD        Subjective:     Principal Problem:Chest pain        HPI:  69 y/o female with history of CAD, CHF, DM, HTN, anxiety, and hypercholesterolemia presents to the ED for central pressure like chest pain that started just prior to arrival to the ED. Patient states that she was sitting in her home with her  when suddenly the  department burst in and started to execute a search warrant. The patient states that they were not looking for her or her , they were looking for a woman that has been living with them. Patient states that when this happened she became very anxious, started having central pressure like chest pain, and was nauseated/weak and vomited. Patient states that she took a nitroglycerin and her chest pain resolved. She states that her heart was racing and she was shaky and weak so she decided to come to the ED to get checked out. The patient took ASA prior to the EMS arriving and on arrival to the ED her pain, nausea, and vomiting had mostly resolved. Patient denies any radiation of her chest pain, diaphoresis, falls, dizziness, or lightheadedness. Code status was discussed and patient was placed as a full code at this time.     ED: Vitals showed , /60, RR 18 saturating at 96% on RA. Labs were significant for sodium 130, glucose 234, troponin 21.5, and WBC 15.24.     Overview/Hospital Course:  Patient admitted to the hospital for chest pain.  Cardiology consult.  Patient had angiogram today.          Objective:     Vital Signs (Most Recent):  Temp: 97.3 °F (36.3 °C) (01/14/24 0747)  Pulse: 66 (01/14/24 0747)  Resp: 20 (01/14/24 0747)  BP: (!) 117/56 (01/14/24 0747)  SpO2: 97 % (01/14/24 0857) Vital  Signs (24h Range):  Temp:  [97.3 °F (36.3 °C)-98.3 °F (36.8 °C)] 97.3 °F (36.3 °C)  Pulse:  [66-95] 66  Resp:  [18-22] 20  SpO2:  [94 %-97 %] 97 %  BP: ()/(45-82) 117/56     Weight: 76.4 kg (168 lb 6.9 oz)  Body mass index is 30.81 kg/m².    Intake/Output Summary (Last 24 hours) at 1/14/2024 1348  Last data filed at 1/13/2024 1646  Gross per 24 hour   Intake 240 ml   Output --   Net 240 ml         Physical Exam  Constitutional:       General: She is not in acute distress.  HENT:      Head: Normocephalic and atraumatic.      Nose: No congestion.   Eyes:      General: No scleral icterus.        Right eye: No discharge.         Left eye: No discharge.      Extraocular Movements: Extraocular movements intact.   Cardiovascular:      Rate and Rhythm: Normal rate and regular rhythm.   Pulmonary:      Effort: Pulmonary effort is normal.      Breath sounds: Normal breath sounds.   Abdominal:      General: Abdomen is flat. Bowel sounds are normal.   Musculoskeletal:         General: No swelling or tenderness.      Cervical back: Normal range of motion and neck supple. No rigidity.   Skin:     General: Skin is warm.   Neurological:      General: No focal deficit present.      Mental Status: She is alert and oriented to person, place, and time.   Psychiatric:         Mood and Affect: Mood normal.             Significant Labs: All pertinent labs within the past 24 hours have been reviewed.  CBC:   Recent Labs   Lab 01/12/24 2110 01/13/24 0326 01/14/24  0540   WBC 15.24* 13.00* 10.88   HGB 13.7 12.1 12.3   HCT 43.1 37.2 38.0    323 330     CMP:   Recent Labs   Lab 01/12/24 2110 01/13/24  0326 01/14/24  0540   * 136 135*   K 4.5 4.3 4.2   CL 96 102 102   CO2 22* 25 23   * 144* 153*   BUN 39* 32* 27*   CREATININE 1.2 1.1 1.0   CALCIUM 9.1 9.3 9.2   PROT 8.1  --   --    ALBUMIN 4.2  --   --    BILITOT 0.4  --   --    ALKPHOS 48*  --   --    AST 15  --   --    ALT 16  --   --    ANIONGAP 12 9 10  "    Cardiac Markers:   Recent Labs   Lab 01/12/24 2110   BNP 93       Significant Imaging: I have reviewed all pertinent imaging results/findings within the past 24 hours.    Assessment/Plan:      * Chest pain  Patient presented to the ED for central, pressure like chest pain that was associated with "shakiness", nausea, vomiting, and generalized weakness   Patient took ASA and nitroglycerin at home ad the chest pain resolved    Troponin 21.5  - repeat troponin pending   BNP 93  ECG: Sinus tachycardia with 2nd degree AV block (Mobitz 2)    Patient was given the following in the ED  - 1 L LR  - Lorazepam  - Zofran   - Protonix     ECHO ordered   TSH ordered   Lipid panel ordered   A1c ordered   Continue to trend troponin       Anxiety  Patient was very anxious on arrival to the ED  Patient states that the  were raiding her house which startled her and along with the chest pain made her anxious     Patient received Lorazepam 1 mg IV in the ED and states that this helped  - Continue PRN    Hypercholesterolemia  Continue home statin       Chronic combined systolic and diastolic heart failure  Patient has chronic combined CHF which is currently controlled   Last ECHO (2/13/2022)  The estimated ejection fraction is 35%.  The left ventricle is normal in size with mild concentric hypertrophy and moderately decreased systolic function.  Grade II left ventricular diastolic dysfunction.  Moderate left atrial enlargement.  There is moderate left ventricular global hypokinesis.  Mild right atrial enlargement.  Mild mitral regurgitation.  Mild tricuspid regurgitation.    Continue home regimen   Continue to monitor   - strict Is and Os   - Telemetry   - daily weights   BNP 93    ECHO ordered     Type 2 diabetes mellitus with hyperglycemia, with long-term current use of insulin  Diabetic diet   Hold all home oral glucose lowering medications   Home insulin regimen ordered   Sliding scale insulin ordered   Continue to " monitor blood glucose with POC glucose checks       Essential hypertension  Continue home regimen as tolerated   - Hold home Metoprolol due to Mobitz 2 block seen on ECG  Continue to monitor vitals       VTE Risk Mitigation (From admission, onward)           Ordered     IP VTE HIGH RISK PATIENT  Once         01/12/24 2334     Place sequential compression device  Until discontinued         01/12/24 2334                    Discharge Planning   CRISTHIAN:      Code Status: Full Code   Is the patient medically ready for discharge?:     Reason for patient still in hospital (select all that apply): Treatment  Discharge Plan A: Home                  Michael Hyde MD  Department of Hospital Medicine   [facility]

## 2024-01-14 NOTE — DISCHARGE INSTRUCTIONS
Post angiogram discharge care  You have a puncture site in your right groin  You can remove your current dressing in 24 hours and take a shower. Showers only for the next week. Do not sit in a bathtub or pool of water 7 days until the puncture site is healed.   Gently clean the puncture site daily using soap and water while showering, dry thoroughly and cover with a Band-Aid. Make sure the Band-Aid and puncture site stay clean and dry.  Inspect the site daily for tenderness, discharge or signs of infection.   Observe the puncture site for signs of bleeding, obvious oozing, formation on knot under skin, or bruising. If any of these were to occur you should immediately lie down flat and apply firm pressure at the insertion site for 10 minutes. If bleeding or swelling does not stop, call 911! Do NOT try to drive yourself to the hospital.   Drink at least 6-8 glasses of clear liquids durin the next 24 hours to flush out the dye.  You must not be alone for the next 24 hours.   You may experience soreness or tenderness that my last for 1 week.   You may have mild oozing from the puncture site and/or possible bruising that could last up to 2 weeks.  You may also have a small lump form that may last up to 6 weeks.       Activity  Do not drive or operate an automobile or hazardous machinery for 24 hours  Do note engage in sports for 1 week.  Limit lifting over 10 pounds for the nest week, or until puncture site heals.  Limit you activities for the next 48 hours. Avoid excessive bending or squatting.  You may resume normal activity in 2-3 days, let pain be your guide.    Call your Doctor immediately if you experience any of the following:  Chest pain, palpitations, shortness of breath, excessive dizziness, fainting, nausea or vomiting.  Any signs of infection: redness, warmth, swelling, pain, drainage (other than a little blood on Band-Aid), chills, poorly healing puncture site, fever greater than 101.0 F  Change in color,  coolness, swelling, numbness, or pain to the involved extremity  Significant bleeding from the puncture site.

## 2024-01-14 NOTE — PLAN OF CARE
Problem: Adult Inpatient Plan of Care  Goal: Plan of Care Review  Outcome: Ongoing, Progressing  Goal: Patient-Specific Goal (Individualized)  Outcome: Ongoing, Progressing  Goal: Absence of Hospital-Acquired Illness or Injury  Outcome: Ongoing, Progressing  Goal: Optimal Comfort and Wellbeing  Outcome: Ongoing, Progressing  Goal: Readiness for Transition of Care  Outcome: Ongoing, Progressing     Problem: Fall Injury Risk  Goal: Absence of Fall and Fall-Related Injury  Outcome: Ongoing, Progressing     Problem: Diabetes Comorbidity  Goal: Blood Glucose Level Within Targeted Range  Outcome: Ongoing, Progressing     Problem: Fluid and Electrolyte Imbalance (Acute Kidney Injury/Impairment)  Goal: Fluid and Electrolyte Balance  Outcome: Ongoing, Progressing     Problem: Oral Intake Inadequate (Acute Kidney Injury/Impairment)  Goal: Optimal Nutrition Intake  Outcome: Ongoing, Progressing     Problem: Renal Function Impairment (Acute Kidney Injury/Impairment)  Goal: Effective Renal Function  Outcome: Ongoing, Progressing

## 2024-01-15 VITALS
TEMPERATURE: 98 F | OXYGEN SATURATION: 96 % | HEIGHT: 62 IN | HEART RATE: 88 BPM | DIASTOLIC BLOOD PRESSURE: 70 MMHG | BODY MASS INDEX: 31.72 KG/M2 | SYSTOLIC BLOOD PRESSURE: 120 MMHG | RESPIRATION RATE: 18 BRPM | WEIGHT: 172.38 LBS

## 2024-01-15 LAB
ANION GAP SERPL CALC-SCNC: 8 MMOL/L (ref 8–16)
BASOPHILS # BLD AUTO: 0.06 K/UL (ref 0–0.2)
BASOPHILS NFR BLD: 0.6 % (ref 0–1.9)
BUN SERPL-MCNC: 23 MG/DL (ref 8–23)
CALCIUM SERPL-MCNC: 8.8 MG/DL (ref 8.7–10.5)
CHLORIDE SERPL-SCNC: 101 MMOL/L (ref 95–110)
CO2 SERPL-SCNC: 25 MMOL/L (ref 23–29)
CREAT SERPL-MCNC: 1 MG/DL (ref 0.5–1.4)
DIFFERENTIAL METHOD BLD: ABNORMAL
EOSINOPHIL # BLD AUTO: 0.3 K/UL (ref 0–0.5)
EOSINOPHIL NFR BLD: 2.6 % (ref 0–8)
ERYTHROCYTE [DISTWIDTH] IN BLOOD BY AUTOMATED COUNT: 13.6 % (ref 11.5–14.5)
EST. GFR  (NO RACE VARIABLE): >60 ML/MIN/1.73 M^2
GLUCOSE SERPL-MCNC: 180 MG/DL (ref 70–110)
GLUCOSE SERPL-MCNC: 203 MG/DL (ref 70–110)
GLUCOSE SERPL-MCNC: 229 MG/DL (ref 70–110)
HCT VFR BLD AUTO: 37.8 % (ref 37–48.5)
HGB BLD-MCNC: 12 G/DL (ref 12–16)
IMM GRANULOCYTES # BLD AUTO: 0.03 K/UL (ref 0–0.04)
IMM GRANULOCYTES NFR BLD AUTO: 0.3 % (ref 0–0.5)
LYMPHOCYTES # BLD AUTO: 2.2 K/UL (ref 1–4.8)
LYMPHOCYTES NFR BLD: 21.7 % (ref 18–48)
MCH RBC QN AUTO: 27.6 PG (ref 27–31)
MCHC RBC AUTO-ENTMCNC: 31.7 G/DL (ref 32–36)
MCV RBC AUTO: 87 FL (ref 82–98)
MONOCYTES # BLD AUTO: 1.2 K/UL (ref 0.3–1)
MONOCYTES NFR BLD: 11.5 % (ref 4–15)
NEUTROPHILS # BLD AUTO: 6.3 K/UL (ref 1.8–7.7)
NEUTROPHILS NFR BLD: 63.3 % (ref 38–73)
NRBC BLD-RTO: 0 /100 WBC
PLATELET # BLD AUTO: 330 K/UL (ref 150–450)
PMV BLD AUTO: 10.1 FL (ref 9.2–12.9)
POTASSIUM SERPL-SCNC: 4.2 MMOL/L (ref 3.5–5.1)
RBC # BLD AUTO: 4.34 M/UL (ref 4–5.4)
SODIUM SERPL-SCNC: 134 MMOL/L (ref 136–145)
WBC # BLD AUTO: 9.99 K/UL (ref 3.9–12.7)

## 2024-01-15 PROCEDURE — 85025 COMPLETE CBC W/AUTO DIFF WBC: CPT

## 2024-01-15 PROCEDURE — 25000003 PHARM REV CODE 250: Performed by: INTERNAL MEDICINE

## 2024-01-15 PROCEDURE — 25000003 PHARM REV CODE 250

## 2024-01-15 PROCEDURE — 36415 COLL VENOUS BLD VENIPUNCTURE: CPT

## 2024-01-15 PROCEDURE — 25000003 PHARM REV CODE 250: Performed by: STUDENT IN AN ORGANIZED HEALTH CARE EDUCATION/TRAINING PROGRAM

## 2024-01-15 PROCEDURE — 80048 BASIC METABOLIC PNL TOTAL CA: CPT

## 2024-01-15 RX ORDER — RANOLAZINE 500 MG/1
500 TABLET, EXTENDED RELEASE ORAL 2 TIMES DAILY
Qty: 60 TABLET | Refills: 0 | Status: SHIPPED | OUTPATIENT
Start: 2024-01-15 | End: 2024-02-22 | Stop reason: SDUPTHER

## 2024-01-15 RX ADMIN — VANCOMYCIN HYDROCHLORIDE 125 MG: KIT at 03:01

## 2024-01-15 RX ADMIN — RANOLAZINE 500 MG: 500 TABLET, FILM COATED, EXTENDED RELEASE ORAL at 08:01

## 2024-01-15 RX ADMIN — ASPIRIN 81 MG: 81 TABLET, COATED ORAL at 08:01

## 2024-01-15 RX ADMIN — INSULIN ASPART 2 UNITS: 100 INJECTION, SOLUTION INTRAVENOUS; SUBCUTANEOUS at 12:01

## 2024-01-15 RX ADMIN — FUROSEMIDE 40 MG: 40 TABLET ORAL at 08:01

## 2024-01-15 RX ADMIN — VANCOMYCIN HYDROCHLORIDE 125 MG: KIT at 08:01

## 2024-01-15 RX ADMIN — METOPROLOL SUCCINATE 25 MG: 25 TABLET, EXTENDED RELEASE ORAL at 08:01

## 2024-01-15 RX ADMIN — ESCITALOPRAM OXALATE 10 MG: 10 TABLET ORAL at 08:01

## 2024-01-15 RX ADMIN — LISINOPRIL 10 MG: 10 TABLET ORAL at 08:01

## 2024-01-15 RX ADMIN — CLOPIDOGREL BISULFATE 75 MG: 75 TABLET, FILM COATED ORAL at 08:01

## 2024-01-15 NOTE — DISCHARGE SUMMARY
[facility] Medicine  Discharge Summary      Patient Name: Celine Steen  MRN: [id number]  STACY: [id number]  Patient Class: IP- Inpatient  Admission Date: 1/12/2024  Hospital Length of Stay: 2 days  Discharge Date and Time:  01/15/2024 3:22 PM  Attending Physician: Michael Hyde MD   Discharging Provider: Michael Hyde MD  Primary Care Provider: Natan Marie MD    Primary Care Team: Networked reference to record PCT     HPI:   69 y/o female with history of CAD, CHF, DM, HTN, anxiety, and hypercholesterolemia presents to the ED for central pressure like chest pain that started just prior to arrival to the ED. Patient states that she was sitting in her home with her  when suddenly the  department burst in and started to execute a search warrant. The patient states that they were not looking for her or her , they were looking for a woman that has been living with them. Patient states that when this happened she became very anxious, started having central pressure like chest pain, and was nauseated/weak and vomited. Patient states that she took a nitroglycerin and her chest pain resolved. She states that her heart was racing and she was shaky and weak so she decided to come to the ED to get checked out. The patient took ASA prior to the EMS arriving and on arrival to the ED her pain, nausea, and vomiting had mostly resolved. Patient denies any radiation of her chest pain, diaphoresis, falls, dizziness, or lightheadedness. Code status was discussed and patient was placed as a full code at this time.     ED: Vitals showed , /60, RR 18 saturating at 96% on RA. Labs were significant for sodium 130, glucose 234, troponin 21.5, and WBC 15.24.     Procedure(s) (LRB):  Angiogram, Coronary, with Left Heart Cath (N/A)      Hospital Course:   Patient admitted to the hospital for chest pain.  Cardiology consult.  Patient had angiogram.  Results as belowed. from  Cardiology standpoint the patient can be discharged home and follow up with them outpatient    Cardiology recommendation  Angiogram showed the 2 vein grafts are occluded and LIMA to LAD is atretic.  of proximal RCA with collaterals. 60% proximal LAD disease. 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 Distal LCx also has disease. The patient want to pursue medical management at this time. Will optimize medical therapy. PCI can be considered in the future.   Added Ranexa to the regimen.  Follow up with primary cardiologist outpatient.              Goals of Care Treatment Preferences:  Code Status: Full Code      Consults:   Consults (From admission, onward)          Status Ordering Provider     Inpatient consult to Cardiology  Once        Provider:  Dana Henriquez MD    Completed YAJAIRA RIGGS            No new Assessment & Plan notes have been filed under this hospital service since the last note was generated.  Service: Hospital Medicine    Final Active Diagnoses:    Diagnosis Date Noted POA    PRINCIPAL PROBLEM:  Chest pain [R07.9] 01/12/2024 Yes    Anxiety [F41.9] 01/12/2024 Yes    Hypercholesterolemia [E78.00] 12/08/2023 Yes    Chronic combined systolic and diastolic heart failure [I50.42] 04/06/2023 Yes     Chronic    Type 2 diabetes mellitus with hyperglycemia, with long-term current use of insulin [E11.65, Z79.4] 02/01/2012 Not Applicable    Essential hypertension [I10]  Yes     Chronic      Problems Resolved During this Admission:       Discharged Condition: fair    Disposition:     Follow Up:    Patient Instructions:   No discharge procedures on file.    Significant Diagnostic Studies: Labs: CMP   Recent Labs   Lab 01/14/24  0540 01/15/24  0443   * 134*   K 4.2 4.2    101   CO2 23 25   * 229*   BUN 27* 23   CREATININE 1.0 1.0   CALCIUM 9.2 8.8   ANIONGAP 10 8   , CBC   Recent Labs   Lab 01/14/24  0540 01/15/24  0443   WBC 10.88 9.99   HGB 12.3 12.0   HCT 38.0 37.8    330   ,  "and Troponin No results for input(s): "TROPONINI" in the last 168 hours.    Pending Diagnostic Studies:       Procedure Component Value Units Date/Time    Urinalysis, Reflex to Urine Culture Urine, Unspecified [[id number]] Collected: 01/13/24 0006    Order Status: Sent Lab Status: In process Updated: 01/13/24 0030    Specimen: Urine            Medications:  Reconciled Home Medications:      Medication List        START taking these medications      ranolazine 500 MG Tb12  Commonly known as: RANEXA  Take 1 tablet (500 mg total) by mouth 2 (two) times daily.            CHANGE how you take these medications      EScitalopram oxalate 20 MG tablet  Commonly known as: LEXAPRO  TAKE 1/2 TABLET BY MOUTH TWICE DAILY  What changed:   how much to take  how to take this  when to take this     mirtazapine 7.5 MG Tab  Commonly known as: REMERON  Take 1 tablet (7.5 mg total) by mouth every evening.  What changed:   when to take this  reasons to take this            CONTINUE taking these medications      aspirin 81 MG EC tablet  Commonly known as: ECOTRIN  Take 81 mg by mouth once daily.     azelastine 137 mcg (0.1 %) nasal spray  Commonly known as: ASTELIN  1 spray (137 mcg total) by Nasal route 2 (two) times daily. for 7 days     blood sugar diagnostic Strp  Commonly known as: FREESTYLE LITE STRIPS  1 each by Misc.(Non-Drug; Combo Route) route 4 (four) times daily.     blood-glucose meter kit  Use as instructed     clopidogreL 75 mg tablet  Commonly known as: PLAVIX  Take 1 tablet (75 mg total) by mouth once daily.     coenzyme Q10 200 mg capsule  Take 200 mg by mouth 2 (two) times daily.     fish oil-omega-3 fatty acids 300-1,000 mg capsule  Take 1 capsule by mouth once daily.     furosemide 40 MG tablet  Commonly known as: LASIX  Take 1 tablet (40 mg total) by mouth 2 (two) times daily.     insulin detemir U-100 (Levemir) 100 unit/mL (3 mL) Inpn pen  Inject 30 Units into the skin every evening.     lancets Misc  Commonly known " "as: LANCETS,THIN  1 each by Misc.(Non-Drug; Combo Route) route 4 (four) times daily.     lisinopriL 10 MG tablet  Take 1 tablet (10 mg total) by mouth once daily.     metoprolol succinate 50 MG 24 hr tablet  Commonly known as: TOPROL-XL  Take 2 tablets (100 mg total) by mouth once daily.     nitroGLYCERIN 0.3 MG SL tablet  Commonly known as: NITROSTAT  Place 1 tablet (0.3 mg total) under the tongue every 5 (five) minutes as needed for Chest pain.     ondansetron 4 MG Tbdl  Commonly known as: ZOFRAN-ODT  Take 1 tablet (4 mg total) by mouth every 8 (eight) hours as needed (nauseau).     pen needle, diabetic 32 gauge x 5/32" Ndle  Commonly known as: DROPLET PEN NEEDLE  1 each by Misc.(Non-Drug; Combo Route) route 3 (three) times daily.     potassium citrate 99 mg Cap  Take 1 capsule by mouth once daily.     simvastatin 40 MG tablet  Commonly known as: ZOCOR  Take 1 tablet (40 mg total) by mouth every evening.              Indwelling Lines/Drains at time of discharge:   Lines/Drains/Airways       None                   Time spent on the discharge of patient: 31   minutes         Michael Hyde MD  Department of Hospital Medicine  [facility]  "

## 2024-01-15 NOTE — PROGRESS NOTES
Angiogram showed the 2 vein grafts are occluded and LIMA to LAD is atretic.  of proximal RCA with collaterals. 60% proximal LAD disease. 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 Distal LCx also has disease. The patient want to pursue medical management at this time. Will optimize medical therapy. PCI can be considered in the future.   Added Ranexa to the regimen.  Follow up with primary cardiologist outpatient.

## 2024-01-15 NOTE — PLAN OF CARE
01/15/24 1528   Final Note   Assessment Type Final Discharge Note   Anticipated Discharge Disposition Home   What phone number can be called within the next 1-3 days to see how you are doing after discharge? [phone]   Post-Acute Status   Discharge Delays None known at this time     Discharge orders and chart reviewed with no further post-acute discharge needs identified at this time.  At this time, patient is cleared for discharge from Case Management standpoint.

## 2024-01-15 NOTE — CARE UPDATE
01/14/24 2143   Patient Assessment/Suction   Level of Consciousness (AVPU) alert   Respiratory Effort Normal   PRE-TX-O2   Device (Oxygen Therapy) room air   SpO2 96 %   Pulse Oximetry Type Intermittent   $ Pulse Oximetry - Multiple Charge Pulse Oximetry - Multiple   Pulse 105   Resp 19   Education   $ Education 15 min   Respiratory Evaluation   $ Care Plan Tech Time 15 min

## 2024-01-16 ENCOUNTER — PATIENT OUTREACH (OUTPATIENT)
Dept: ADMINISTRATIVE | Facility: CLINIC | Age: 69
End: 2024-01-16
Payer: MEDICARE

## 2024-01-16 NOTE — PROGRESS NOTES
C3 nurse attempted to contact Celine Steen for a TCC post hospital discharge follow up call. No answer. No voicemail.  The patient has a scheduled HOSFU appointment with Bushra Mojica on 01/24/24 @ 5609.

## 2024-01-17 NOTE — PROGRESS NOTES
C3 nurse spoke with Celine tSeen for a TCC post hospital discharge follow up call. The patient has a scheduled HOSFU appointment with Bushra Mojica on 01/24/24 @ 3654.

## 2024-01-22 DIAGNOSIS — E11.59 HYPERTENSION ASSOCIATED WITH TYPE 2 DIABETES MELLITUS: ICD-10-CM

## 2024-01-22 DIAGNOSIS — Z95.810 ICD (IMPLANTABLE CARDIOVERTER-DEFIBRILLATOR) IN PLACE: ICD-10-CM

## 2024-01-22 DIAGNOSIS — I15.2 HYPERTENSION ASSOCIATED WITH TYPE 2 DIABETES MELLITUS: ICD-10-CM

## 2024-01-23 RX ORDER — LISINOPRIL 10 MG/1
10 TABLET ORAL DAILY
Qty: 90 TABLET | Refills: 3 | Status: SHIPPED | OUTPATIENT
Start: 2024-01-23 | End: 2024-05-15 | Stop reason: ALTCHOICE

## 2024-01-23 RX ORDER — CLOPIDOGREL BISULFATE 75 MG/1
75 TABLET ORAL DAILY
Qty: 90 TABLET | Refills: 3 | Status: ON HOLD | OUTPATIENT
Start: 2024-01-23 | End: 2024-06-05 | Stop reason: HOSPADM

## 2024-01-23 NOTE — TELEPHONE ENCOUNTER
No care due was identified.  [facility] Embedded Care Due Messages. Reference number: [id number].   1/23/2024 2:37:07 AM CST

## 2024-01-23 NOTE — TELEPHONE ENCOUNTER
Refill Routing Note   Medication(s) are not appropriate for processing by [facility] for the following reason(s):        ED/Hospital Visit since last OV with provider    [facility] action(s):  Defer      Refill request routed to [facility] Review Pool for Pharmacist Review.          Pharmacist review requested: Yes     Appointments  past 12m or future 3m with PCP    Date Provider   Last Visit   12/15/2023 Natan Marie MD   Next Visit   Visit date not found Natan Marie MD   ED visits in past 90 days: 0        Note composed:2:59 AM 01/23/2024

## 2024-01-23 NOTE — TELEPHONE ENCOUNTER
Refill Routing Note   Medication(s) are not appropriate for processing by [facility] for the following reason(s):        ED/Hospital Visit since last OV with provider  No active prescription written by provider    [facility] action(s):  Defer             Pharmacist review requested: Yes   Extended chart review required: Yes     Appointments  past 12m or future 3m with PCP    Date Provider   Last Visit   12/15/2023 Natan Marie MD   Next Visit   Visit date not found Natan Marie MD   ED visits in past 90 days: 0        Note composed:3:28 AM 01/23/2024

## 2024-01-24 ENCOUNTER — OFFICE VISIT (OUTPATIENT)
Dept: FAMILY MEDICINE | Facility: CLINIC | Age: 69
End: 2024-01-24
Payer: MEDICARE

## 2024-01-24 VITALS
DIASTOLIC BLOOD PRESSURE: 68 MMHG | RESPIRATION RATE: 17 BRPM | BODY MASS INDEX: 30.73 KG/M2 | OXYGEN SATURATION: 98 % | SYSTOLIC BLOOD PRESSURE: 98 MMHG | TEMPERATURE: 98 F | HEART RATE: 83 BPM | WEIGHT: 168 LBS

## 2024-01-24 DIAGNOSIS — E11.65 TYPE 2 DIABETES MELLITUS WITH HYPERGLYCEMIA, WITH LONG-TERM CURRENT USE OF INSULIN: Primary | ICD-10-CM

## 2024-01-24 DIAGNOSIS — I25.10 CORONARY ARTERY DISEASE INVOLVING NATIVE CORONARY ARTERY OF NATIVE HEART WITHOUT ANGINA PECTORIS: Chronic | ICD-10-CM

## 2024-01-24 DIAGNOSIS — I20.89 ANGINAL EQUIVALENT: ICD-10-CM

## 2024-01-24 DIAGNOSIS — Z79.4 TYPE 2 DIABETES MELLITUS WITH HYPERGLYCEMIA, WITH LONG-TERM CURRENT USE OF INSULIN: Primary | ICD-10-CM

## 2024-01-24 DIAGNOSIS — I50.42 CHRONIC COMBINED SYSTOLIC AND DIASTOLIC HEART FAILURE: Chronic | ICD-10-CM

## 2024-01-24 PROCEDURE — 3074F SYST BP LT 130 MM HG: CPT | Mod: HCNC,CPTII,S$GLB,

## 2024-01-24 PROCEDURE — 3046F HEMOGLOBIN A1C LEVEL >9.0%: CPT | Mod: HCNC,CPTII,S$GLB,

## 2024-01-24 PROCEDURE — 99214 OFFICE O/P EST MOD 30 MIN: CPT | Mod: HCNC,S$GLB,,

## 2024-01-24 PROCEDURE — 1111F DSCHRG MED/CURRENT MED MERGE: CPT | Mod: HCNC,CPTII,S$GLB,

## 2024-01-24 PROCEDURE — 1126F AMNT PAIN NOTED NONE PRSNT: CPT | Mod: HCNC,CPTII,S$GLB,

## 2024-01-24 PROCEDURE — 1101F PT FALLS ASSESS-DOCD LE1/YR: CPT | Mod: HCNC,CPTII,S$GLB,

## 2024-01-24 PROCEDURE — 1160F RVW MEDS BY RX/DR IN RCRD: CPT | Mod: HCNC,CPTII,S$GLB,

## 2024-01-24 PROCEDURE — 4010F ACE/ARB THERAPY RXD/TAKEN: CPT | Mod: HCNC,CPTII,S$GLB,

## 2024-01-24 PROCEDURE — 1159F MED LIST DOCD IN RCRD: CPT | Mod: HCNC,CPTII,S$GLB,

## 2024-01-24 PROCEDURE — 3288F FALL RISK ASSESSMENT DOCD: CPT | Mod: HCNC,CPTII,S$GLB,

## 2024-01-24 PROCEDURE — 3078F DIAST BP <80 MM HG: CPT | Mod: HCNC,CPTII,S$GLB,

## 2024-01-24 PROCEDURE — 99999 PR PBB SHADOW E&M-EST. PATIENT-LVL V: CPT | Mod: PBBFAC,HCNC,,

## 2024-01-24 NOTE — PROGRESS NOTES
Subjective:       Patient ID: Celine Steen is a 68 y.o. female.    Chief Complaint: No chief complaint on file.      HPI    Review of Systems      Past Medical History:   Diagnosis Date    Anxiety     CAD (coronary artery disease)     CHF (congestive heart failure)     Chronic low back pain     Diabetes 2/1/2012    Diabetes mellitus     Hyperkalemia 2/13/2022    Hypertension     Leukocytosis 2/13/2022    Metabolic acidosis with respiratory acidosis 2/13/2022       Review of patient's allergies indicates:  No Known Allergies      Current Outpatient Medications:     aspirin (ECOTRIN) 81 MG EC tablet, Take 81 mg by mouth once daily., Disp: , Rfl:     azelastine (ASTELIN) 137 mcg (0.1 %) nasal spray, 1 spray (137 mcg total) by Nasal route 2 (two) times daily. for 7 days, Disp: 30 mL, Rfl: 0    blood sugar diagnostic (FREESTYLE LITE STRIPS) Strp, 1 each by Misc.(Non-Drug; Combo Route) route 4 (four) times daily., Disp: 200 each, Rfl: 0    blood-glucose meter kit, Use as instructed, Disp: 1 each, Rfl: 0    clopidogreL (PLAVIX) 75 mg tablet, Take 1 tablet (75 mg total) by mouth once daily., Disp: 90 tablet, Rfl: 3    coenzyme Q10 200 mg capsule, Take 200 mg by mouth 2 (two) times daily., Disp: , Rfl:     EScitalopram oxalate (LEXAPRO) 20 MG tablet, TAKE 1/2 TABLET BY MOUTH TWICE DAILY (Patient taking differently: Take 10 mg by mouth 2 (two) times a day. TAKE 1/2 TABLET BY MOUTH TWICE DAILY), Disp: 90 tablet, Rfl: 0    furosemide (LASIX) 40 MG tablet, Take 1 tablet (40 mg total) by mouth 2 (two) times daily., Disp: 180 tablet, Rfl: 2    insulin detemir U-100, Levemir, 100 unit/mL (3 mL) SubQ InPn pen, Inject 30 Units into the skin every evening., Disp: 9 mL, Rfl: 11    lancets (LANCETS,THIN) Misc, 1 each by Misc.(Non-Drug; Combo Route) route 4 (four) times daily., Disp: 200 each, Rfl: 0    lisinopriL 10 MG tablet, Take 1 tablet (10 mg total) by mouth once daily., Disp: 90 tablet, Rfl: 3    metoprolol succinate (TOPROL-XL)  "50 MG 24 hr tablet, Take 2 tablets (100 mg total) by mouth once daily., Disp: 180 tablet, Rfl: 3    mirtazapine (REMERON) 7.5 MG Tab, Take 1 tablet (7.5 mg total) by mouth every evening., Disp: 30 tablet, Rfl: 11    nitroGLYCERIN (NITROSTAT) 0.3 MG SL tablet, Place 1 tablet (0.3 mg total) under the tongue every 5 (five) minutes as needed for Chest pain., Disp: 25 tablet, Rfl: 12    omega-3 fatty acids/fish oil (FISH OIL-OMEGA-3 FATTY ACIDS) 300-1,000 mg capsule, Take 1 capsule by mouth once daily., Disp: , Rfl:     ondansetron (ZOFRAN-ODT) 4 MG TbDL, Take 1 tablet (4 mg total) by mouth every 8 (eight) hours as needed (nauseau)., Disp: 30 tablet, Rfl: 0    pen needle, diabetic (DROPLET PEN NEEDLE) 32 gauge x 5/32" Ndle, 1 each by Misc.(Non-Drug; Combo Route) route 3 (three) times daily., Disp: 90 each, Rfl: 3    potassium citrate 99 mg Cap, Take 1 capsule by mouth once daily., Disp: , Rfl:     ranolazine (RANEXA) 500 MG Tb12, Take 1 tablet (500 mg total) by mouth 2 (two) times daily., Disp: 60 tablet, Rfl: 0    simvastatin (ZOCOR) 40 MG tablet, Take 1 tablet (40 mg total) by mouth every evening., Disp: 90 tablet, Rfl: 3    Objective:        Physical Exam      There were no vitals taken for this visit.   Assessment:         No diagnosis found.    Plan:         There are no diagnoses linked to this encounter.     No follow-ups on file.        Family Medicine Physician Assistant     Future Appointments       Date Provider Specialty Appt Notes    1/24/2024 Bushra Mojica PA-C Family Medicine 1 month f/u    3/1/2024 MAGALI Pang PA-C Endocrinology dm 2             Tests to Keep You Healthy    Mammogram: ORDERED BUT NOT SCHEDULED  Eye Exam: ORDERED BUT NOT SCHEDULED  Colon Cancer Screening: DUE  Last Blood Pressure <= 139/89 (12/15/2023): Yes  Last HbA1c < 8 (01/12/2024): NO       I spent a total of *** minutes on the day of the visit.This includes face to face time and non-face to face time preparing to see the " patient (eg, review of tests), obtaining and/or reviewing separately obtained history, documenting clinical information in the electronic or other health record, independently interpreting results and communicating results to the patient/family/caregiver, or care coordinator.    We have addressed {BJCPROBCOMPLEXITY:90258}  The complexity of the data reviewed and analyzed for this visit was {BJCCOMPLEXITYOFDATA:35348}  The risk of complications and/or morbidity or mortality are {BJCRISKOFCOMPLICATION:83923}   The level of Medical Decision Making for this visit is {BJCLEVELOFMDM:63169}

## 2024-01-24 NOTE — PROGRESS NOTES
Subjective:       Patient ID: Celine Steen is a 68 y.o. female.    Chief Complaint: hospital follow up     Transitional Care Note    Family and/or Caretaker present at visit?  No.  Diagnostic tests reviewed/disposition: No diagnosic tests pending after this hospitalization.  Home health/community services discussion/referrals: Patient does not have home health established from hospital visit.  They do not need home health.  If needed, we will set up home health for the patient.   Establishment or re-establishment of referral orders for community resources: No other necessary community resources.   Discussion with other health care providers: No discussion with other health care providers necessary.        Celine Steen is a 68 y.o. female with history of CAD, HTN, DM2, CHF, anxiety who presents to clinic for hospital follow up. Presented to the ER on 1/12 for evaluation of chest pain, nausea and diarrhea. Worked up for cardiac etiologies, angiogram results below. The patient was started on Ranexa, and instructed to follow up with cardiology outpatient. No further episodes of chest pain/ tightness, nausea, vomiting, or diarrhea. See discharge summary below.         [facility]  Discharge Summary        Patient Name: Celine Steen  MRN: [id number]  STACY: [id number]  Patient Class: IP- Inpatient  Admission Date: 1/12/2024  Hospital Length of Stay: 2 days  Discharge Date and Time:  01/15/2024 3:22 PM  Attending Physician: Michael Hyde MD   Discharging Provider: Michael Hyde MD  Primary Care Provider: Natan Marie MD     Primary Care Team: Networked reference to record PCT      HPI:   67 y/o female with history of CAD, CHF, DM, HTN, anxiety, and hypercholesterolemia presents to the ED for central pressure like chest pain that started just prior to arrival to the ED. Patient states that she was sitting in her home with her  when suddenly the  department burst  in and started to execute a search warrant. The patient states that they were not looking for her or her , they were looking for a woman that has been living with them. Patient states that when this happened she became very anxious, started having central pressure like chest pain, and was nauseated/weak and vomited. Patient states that she took a nitroglycerin and her chest pain resolved. She states that her heart was racing and she was shaky and weak so she decided to come to the ED to get checked out. The patient took ASA prior to the EMS arriving and on arrival to the ED her pain, nausea, and vomiting had mostly resolved. Patient denies any radiation of her chest pain, diaphoresis, falls, dizziness, or lightheadedness. Code status was discussed and patient was placed as a full code at this time.      ED: Vitals showed , /60, RR 18 saturating at 96% on RA. Labs were significant for sodium 130, glucose 234, troponin 21.5, and WBC 15.24.      Procedure(s) (LRB):  Angiogram, Coronary, with Left Heart Cath (N/A)       Hospital Course:   Patient admitted to the hospital for chest pain.  Cardiology consult.  Patient had angiogram.  Results as belowed. from Cardiology standpoint the patient can be discharged home and follow up with them outpatient     Cardiology recommendation  Angiogram showed the 2 vein grafts are occluded and LIMA to LAD is atretic.  of proximal RCA with collaterals. 60% proximal LAD disease. 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 Distal LCx also has disease. The patient want to pursue medical management at this time. Will optimize medical therapy. PCI can be considered in the future.   Added Ranexa to the regimen.  Follow up with primary cardiologist outpatient.                 Goals of Care Treatment Preferences:  Code Status: Full Code        Consults:   Consults (From admission, onward)           Status Ordering Provider        Inpatient consult to Cardiology  Once      "  Provider:  Dana Henriquez MD   Completed YAJAIRA RIGGS              No new Assessment & Plan notes have been filed under this hospital service since the last note was generated.  Service: Hospital Medicine     Final Active Diagnoses:    Diagnosis Date Noted POA  · PRINCIPAL PROBLEM:  Chest pain [R07.9] 01/12/2024 Yes  · Anxiety [F41.9] 01/12/2024 Yes  · Hypercholesterolemia [E78.00] 12/08/2023 Yes  · Chronic combined systolic and diastolic heart failure [I50.42] 04/06/2023 Yes      Chronic  · Type 2 diabetes mellitus with hyperglycemia, with long-term current use of insulin [E11.65, Z79.4] 02/01/2012 Not Applicable  · Essential hypertension [I10]   Yes      Chronic     Problems Resolved During this Admission:        Discharged Condition: fair     Disposition:      Follow Up:     Patient Instructions:   No discharge procedures on file.     Significant Diagnostic Studies: Labs: CMP   Recent Labs  Lab 01/14/24  0540 01/15/24  0443  * 134*  K 4.2 4.2   101  CO2 23 25  * 229*  BUN 27* 23  CREATININE 1.0 1.0  CALCIUM 9.2 8.8  ANIONGAP 10 8  , CBC   Recent Labs  Lab 01/14/24  0540 01/15/24  0443  WBC 10.88 9.99  HGB 12.3 12.0  HCT 38.0 37.8   330  , and Troponin No results for input(s): "TROPONINI" in the last 168 hours.     Pending Diagnostic Studies:        Procedure Component Value Units Date/Time    Urinalysis, Reflex to Urine Culture Urine, Unspecified [[id number]] Collected: 01/13/24 0006    Order Status: Sent Lab Status: In process Updated: 01/13/24 0030    Specimen: Urine             Medications:  Reconciled Home Medications:      Medication List        START taking these medications      ranolazine 500 MG Tb12  Commonly known as: RANEXA  Take 1 tablet (500 mg total) by mouth 2 (two) times daily.             Discharge note on 1/15/24    Review of Systems   Constitutional:  Negative for activity change, appetite change, fatigue and unexpected weight change.   Respiratory:  Negative " for cough and shortness of breath.    Cardiovascular:  Negative for chest pain, palpitations and leg swelling.   Gastrointestinal:  Positive for constipation. Negative for abdominal pain, diarrhea, nausea and vomiting.   Neurological:  Negative for dizziness, light-headedness and headaches.       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   Constitutional:       General: She is not in acute distress.     Appearance: Normal appearance.   HENT:      Head: Normocephalic and atraumatic.   Eyes:      Conjunctiva/sclera: Conjunctivae normal.   Cardiovascular:      Rate and Rhythm: Normal rate and regular rhythm.      Heart sounds: Normal heart sounds.   Pulmonary:      Effort: Pulmonary effort is normal.      Breath sounds: Normal breath sounds.   Musculoskeletal:         General: Normal range of motion.      Cervical back: Normal range of motion and neck supple.   Skin:     General: Skin is warm and dry.   Neurological:      Mental Status: She is alert and oriented to person, place, and time.   Psychiatric:         Mood and Affect: Mood normal.         Behavior: Behavior normal.         Thought Content: Thought content normal.         Judgment: Judgment normal.         Lab Results   Component Value Date    WBC 9.99 01/15/2024    HGB 12.0 01/15/2024    HCT 37.8 01/15/2024     01/15/2024    CHOL  143 01/12/2024    TRIG 113 01/12/2024    HDL 45 01/12/2024    ALT 16 01/12/2024    AST 15 01/12/2024     (L) 01/15/2024    K 4.2 01/15/2024     01/15/2024    CREATININE 1.0 01/15/2024    BUN 23 01/15/2024    CO2 25 01/15/2024    TSH 0.069 (L) 01/12/2024    INR 1.0 01/13/2024    HGBA1C 11.1 (H) 01/12/2024     The ASCVD Risk score (Aftab PACHECO, et al., 2019) failed to calculate for the following reasons:    The patient has a prior MI or stroke diagnosis  Visit Vitals  BP 98/68 (BP Location: Left arm, Patient Position: Sitting, BP Method: Medium (Manual))   Pulse 83   Temp 97.6 °F (36.4 °C) (Oral)   Resp 17   Wt 76.2 kg (167 lb 15.9 oz)   SpO2 98%   BMI 30.73 kg/m²      Assessment:       1. Type 2 diabetes mellitus with hyperglycemia, with long-term current use of insulin    2. Coronary artery disease involving native coronary artery of native heart without angina pectoris    3. Chronic combined systolic and diastolic heart failure    4. Anginal equivalent        Plan:       1. Type 2 diabetes mellitus with hyperglycemia, with long-term current use of insulin  -     HEMOGLOBIN A1C; Future; Expected date: 01/24/2024  -     Today's appointment was initially for a 1 month follow up following repeat HgbA1c. A1c at ER was 11.9, trending down from 15. She is currently taking Levemir 30 units in the evening.   -     Recommend she have labs in about 3 months and follow up with me. Follow up with PCP in 6 months.     2. Coronary artery disease involving native coronary artery of native heart without angina pectoris  -     Ambulatory referral/consult to Cardiology; Future; Expected date: 01/31/2024  -     Stable, continue current medications. Continue Ranexa as prescribed.     3. Chronic combined systolic and diastolic heart failure  -     Ambulatory referral/consult to Cardiology; Future; Expected date: 01/31/2024    4. Anginal equivalent  -     Ambulatory referral/consult to Cardiology; Future; Expected date:  01/31/2024     Follow up with cardiology. Discussed return to ER precautions in detail, to include chest pain, shortness of breath, nausea/ vomiting. Patient demonstrates understanding.     Follow up with me in 3 months with labs prior, then PCP in 6 months.       Future Appointments       Date Provider Specialty Appt Notes    2/27/2024 Dana Henriquez MD Cardiology .    3/1/2024 MAGALI Pang PA-C Endocrinology dm 2    4/17/2024  Lab .    4/24/2024 Bushra Mojica PA-C Family Medicine 3 mo f/u    8/6/2024 Natan Marie MD Family Medicine 6 mo f/u               Tests to Keep You Healthy    Mammogram: ORDERED BUT NOT SCHEDULED  Eye Exam: ORDERED BUT NOT SCHEDULED  Colon Cancer Screening: DUE  Last Blood Pressure <= 139/89 (1/24/2024): Yes  Last HbA1c < 8 (01/12/2024): NO

## 2024-02-22 DIAGNOSIS — Z79.4 TYPE 2 DIABETES MELLITUS WITH HYPERGLYCEMIA, WITH LONG-TERM CURRENT USE OF INSULIN: Primary | ICD-10-CM

## 2024-02-22 DIAGNOSIS — E11.65 TYPE 2 DIABETES MELLITUS WITH HYPERGLYCEMIA, WITH LONG-TERM CURRENT USE OF INSULIN: Primary | ICD-10-CM

## 2024-02-22 RX ORDER — LANCETS
1 EACH MISCELLANEOUS 4 TIMES DAILY
Qty: 200 EACH | Refills: 3 | Status: SHIPPED | OUTPATIENT
Start: 2024-02-22

## 2024-02-22 NOTE — TELEPHONE ENCOUNTER
----- Message from Lauren Demetrius sent at 2/22/2024  3:15 PM CST -----  Regarding: refill  Contact: patient  Type:  RX Refill Request    Who Called:  patient  Refill or New Rx:  refill  RX Name and Strength:  ranolazine (RANEXA) 500 MG Tb12  How is the patient currently taking it? (ex. 1XDay):  as directed  Is this a 30 day or 90 day RX:  90  Preferred Pharmacy with phone number:    [facility][id number] - [place] - [address] AT [facility] [address] [facility]  [address]  [place] [address]  Phone: [phone] Fax: [phone]      Local or Mail Order:  local  Ordering Provider:  Dr. Florence Soler Call Back Number:  [phone] (home)     Additional Information:  Please call patient to advise.  Thanks!

## 2024-02-22 NOTE — TELEPHONE ENCOUNTER
No care due was identified.  [facility] Embedded Care Due Messages. Reference number: [id number].   2/22/2024 3:45:13 PM CST

## 2024-02-22 NOTE — TELEPHONE ENCOUNTER
----- Message from Lauren Romo sent at 2/22/2024  3:17 PM CST -----  Regarding: refill  Contact: patient  Type:  RX Refill Request    Who Called:  patient  Refill or New Rx:  refill  RX Name and Strength:  blood sugar diagnostic (FREESTYLE LITE STRIPS) Strp  How is the patient currently taking it? (ex. 1XDay):  as directed  Is this a 30 day or 90 day RX:  90  Preferred Pharmacy with phone number:    [email or web address] #[id number] - [place] - [address] AT [facility] [address] [facility]  [address]  [place] [address]  Phone: [phone] Fax: [phone]      Local or Mail Order:  local  Ordering Provider:  Frank Soler Call Back Number:  [phone] (home)     Additional Information:  Please call patient to advise.  Thanks!

## 2024-02-27 ENCOUNTER — OFFICE VISIT (OUTPATIENT)
Dept: CARDIOLOGY | Facility: CLINIC | Age: 69
End: 2024-02-27
Payer: MEDICARE

## 2024-02-27 VITALS
DIASTOLIC BLOOD PRESSURE: 60 MMHG | BODY MASS INDEX: 30.73 KG/M2 | HEIGHT: 62 IN | OXYGEN SATURATION: 95 % | WEIGHT: 167 LBS | HEART RATE: 84 BPM | SYSTOLIC BLOOD PRESSURE: 122 MMHG

## 2024-02-27 DIAGNOSIS — I50.20 HEART FAILURE WITH REDUCED EJECTION FRACTION: ICD-10-CM

## 2024-02-27 DIAGNOSIS — I10 PRIMARY HYPERTENSION: ICD-10-CM

## 2024-02-27 DIAGNOSIS — E78.2 MIXED HYPERLIPIDEMIA: ICD-10-CM

## 2024-02-27 DIAGNOSIS — Z95.810 ICD (IMPLANTABLE CARDIOVERTER-DEFIBRILLATOR) IN PLACE: ICD-10-CM

## 2024-02-27 DIAGNOSIS — I25.10 CORONARY ARTERY DISEASE INVOLVING NATIVE CORONARY ARTERY OF NATIVE HEART WITHOUT ANGINA PECTORIS: Primary | Chronic | ICD-10-CM

## 2024-02-27 PROCEDURE — 3008F BODY MASS INDEX DOCD: CPT | Mod: HCNC,CPTII,S$GLB, | Performed by: STUDENT IN AN ORGANIZED HEALTH CARE EDUCATION/TRAINING PROGRAM

## 2024-02-27 PROCEDURE — 1159F MED LIST DOCD IN RCRD: CPT | Mod: HCNC,CPTII,S$GLB, | Performed by: STUDENT IN AN ORGANIZED HEALTH CARE EDUCATION/TRAINING PROGRAM

## 2024-02-27 PROCEDURE — 3046F HEMOGLOBIN A1C LEVEL >9.0%: CPT | Mod: HCNC,CPTII,S$GLB, | Performed by: STUDENT IN AN ORGANIZED HEALTH CARE EDUCATION/TRAINING PROGRAM

## 2024-02-27 PROCEDURE — 3074F SYST BP LT 130 MM HG: CPT | Mod: HCNC,CPTII,S$GLB, | Performed by: STUDENT IN AN ORGANIZED HEALTH CARE EDUCATION/TRAINING PROGRAM

## 2024-02-27 PROCEDURE — 3078F DIAST BP <80 MM HG: CPT | Mod: HCNC,CPTII,S$GLB, | Performed by: STUDENT IN AN ORGANIZED HEALTH CARE EDUCATION/TRAINING PROGRAM

## 2024-02-27 PROCEDURE — 99999 PR PBB SHADOW E&M-EST. PATIENT-LVL V: CPT | Mod: PBBFAC,HCNC,, | Performed by: STUDENT IN AN ORGANIZED HEALTH CARE EDUCATION/TRAINING PROGRAM

## 2024-02-27 PROCEDURE — 1160F RVW MEDS BY RX/DR IN RCRD: CPT | Mod: HCNC,CPTII,S$GLB, | Performed by: STUDENT IN AN ORGANIZED HEALTH CARE EDUCATION/TRAINING PROGRAM

## 2024-02-27 PROCEDURE — 1101F PT FALLS ASSESS-DOCD LE1/YR: CPT | Mod: HCNC,CPTII,S$GLB, | Performed by: STUDENT IN AN ORGANIZED HEALTH CARE EDUCATION/TRAINING PROGRAM

## 2024-02-27 PROCEDURE — 99214 OFFICE O/P EST MOD 30 MIN: CPT | Mod: HCNC,S$GLB,, | Performed by: STUDENT IN AN ORGANIZED HEALTH CARE EDUCATION/TRAINING PROGRAM

## 2024-02-27 PROCEDURE — 1126F AMNT PAIN NOTED NONE PRSNT: CPT | Mod: HCNC,CPTII,S$GLB, | Performed by: STUDENT IN AN ORGANIZED HEALTH CARE EDUCATION/TRAINING PROGRAM

## 2024-02-27 PROCEDURE — 4010F ACE/ARB THERAPY RXD/TAKEN: CPT | Mod: HCNC,CPTII,S$GLB, | Performed by: STUDENT IN AN ORGANIZED HEALTH CARE EDUCATION/TRAINING PROGRAM

## 2024-02-27 PROCEDURE — 3288F FALL RISK ASSESSMENT DOCD: CPT | Mod: HCNC,CPTII,S$GLB, | Performed by: STUDENT IN AN ORGANIZED HEALTH CARE EDUCATION/TRAINING PROGRAM

## 2024-02-27 RX ORDER — RANOLAZINE 500 MG/1
500 TABLET, EXTENDED RELEASE ORAL 2 TIMES DAILY
Qty: 60 TABLET | Refills: 11 | Status: SHIPPED | OUTPATIENT
Start: 2024-02-27 | End: 2024-02-27 | Stop reason: SDUPTHER

## 2024-02-27 RX ORDER — RANOLAZINE 500 MG/1
500 TABLET, EXTENDED RELEASE ORAL 2 TIMES DAILY
Qty: 180 TABLET | Refills: 3 | Status: SHIPPED | OUTPATIENT
Start: 2024-02-27 | End: 2025-02-26

## 2024-03-01 ENCOUNTER — OFFICE VISIT (OUTPATIENT)
Dept: ENDOCRINOLOGY | Facility: CLINIC | Age: 69
End: 2024-03-01
Payer: MEDICARE

## 2024-03-01 VITALS
OXYGEN SATURATION: 94 % | HEART RATE: 85 BPM | HEIGHT: 62 IN | SYSTOLIC BLOOD PRESSURE: 112 MMHG | DIASTOLIC BLOOD PRESSURE: 74 MMHG | TEMPERATURE: 98 F | BODY MASS INDEX: 31.03 KG/M2 | WEIGHT: 168.63 LBS

## 2024-03-01 DIAGNOSIS — E11.65 TYPE 2 DIABETES MELLITUS WITH HYPERGLYCEMIA, WITH LONG-TERM CURRENT USE OF INSULIN: Primary | ICD-10-CM

## 2024-03-01 DIAGNOSIS — I15.2 HYPERTENSION ASSOCIATED WITH TYPE 2 DIABETES MELLITUS: ICD-10-CM

## 2024-03-01 DIAGNOSIS — E05.90 SUBCLINICAL HYPERTHYROIDISM: ICD-10-CM

## 2024-03-01 DIAGNOSIS — E66.01 MORBID (SEVERE) OBESITY DUE TO EXCESS CALORIES: ICD-10-CM

## 2024-03-01 DIAGNOSIS — E11.69 COMBINED HYPERLIPIDEMIA ASSOCIATED WITH TYPE 2 DIABETES MELLITUS: ICD-10-CM

## 2024-03-01 DIAGNOSIS — E78.2 COMBINED HYPERLIPIDEMIA ASSOCIATED WITH TYPE 2 DIABETES MELLITUS: ICD-10-CM

## 2024-03-01 DIAGNOSIS — Z79.4 TYPE 2 DIABETES MELLITUS WITH HYPERGLYCEMIA, WITH LONG-TERM CURRENT USE OF INSULIN: Primary | ICD-10-CM

## 2024-03-01 DIAGNOSIS — Z78.0 POSTMENOPAUSAL: ICD-10-CM

## 2024-03-01 DIAGNOSIS — M85.80 OSTEOPENIA, UNSPECIFIED LOCATION: ICD-10-CM

## 2024-03-01 DIAGNOSIS — E11.59 HYPERTENSION ASSOCIATED WITH TYPE 2 DIABETES MELLITUS: ICD-10-CM

## 2024-03-01 LAB — GLUCOSE SERPL-MCNC: 160 MG/DL (ref 70–110)

## 2024-03-01 PROCEDURE — 82962 GLUCOSE BLOOD TEST: CPT | Mod: HCNC,S$GLB,, | Performed by: PHYSICIAN ASSISTANT

## 2024-03-01 PROCEDURE — 1159F MED LIST DOCD IN RCRD: CPT | Mod: HCNC,CPTII,S$GLB, | Performed by: PHYSICIAN ASSISTANT

## 2024-03-01 PROCEDURE — 3074F SYST BP LT 130 MM HG: CPT | Mod: HCNC,CPTII,S$GLB, | Performed by: PHYSICIAN ASSISTANT

## 2024-03-01 PROCEDURE — 1125F AMNT PAIN NOTED PAIN PRSNT: CPT | Mod: HCNC,CPTII,S$GLB, | Performed by: PHYSICIAN ASSISTANT

## 2024-03-01 PROCEDURE — 1160F RVW MEDS BY RX/DR IN RCRD: CPT | Mod: HCNC,CPTII,S$GLB, | Performed by: PHYSICIAN ASSISTANT

## 2024-03-01 PROCEDURE — 4010F ACE/ARB THERAPY RXD/TAKEN: CPT | Mod: HCNC,CPTII,S$GLB, | Performed by: PHYSICIAN ASSISTANT

## 2024-03-01 PROCEDURE — 99999 PR PBB SHADOW E&M-EST. PATIENT-LVL V: CPT | Mod: PBBFAC,HCNC,, | Performed by: PHYSICIAN ASSISTANT

## 2024-03-01 PROCEDURE — 3078F DIAST BP <80 MM HG: CPT | Mod: HCNC,CPTII,S$GLB, | Performed by: PHYSICIAN ASSISTANT

## 2024-03-01 PROCEDURE — 3008F BODY MASS INDEX DOCD: CPT | Mod: HCNC,CPTII,S$GLB, | Performed by: PHYSICIAN ASSISTANT

## 2024-03-01 PROCEDURE — 99204 OFFICE O/P NEW MOD 45 MIN: CPT | Mod: HCNC,S$GLB,, | Performed by: PHYSICIAN ASSISTANT

## 2024-03-01 PROCEDURE — 3288F FALL RISK ASSESSMENT DOCD: CPT | Mod: HCNC,CPTII,S$GLB, | Performed by: PHYSICIAN ASSISTANT

## 2024-03-01 PROCEDURE — 3046F HEMOGLOBIN A1C LEVEL >9.0%: CPT | Mod: HCNC,CPTII,S$GLB, | Performed by: PHYSICIAN ASSISTANT

## 2024-03-01 PROCEDURE — 1101F PT FALLS ASSESS-DOCD LE1/YR: CPT | Mod: HCNC,CPTII,S$GLB, | Performed by: PHYSICIAN ASSISTANT

## 2024-03-01 RX ORDER — INSULIN GLARGINE 300 [IU]/ML
INJECTION, SOLUTION SUBCUTANEOUS
Qty: 30 ML | Refills: 3 | Status: SHIPPED | OUTPATIENT
Start: 2024-03-01

## 2024-03-01 RX ORDER — SEMAGLUTIDE 0.68 MG/ML
INJECTION, SOLUTION SUBCUTANEOUS
Qty: 3 ML | Refills: 11 | Status: SHIPPED | OUTPATIENT
Start: 2024-03-01 | End: 2025-03-31

## 2024-03-01 RX ORDER — BLOOD-GLUCOSE SENSOR
EACH MISCELLANEOUS
Qty: 3 EACH | Refills: 11 | Status: SHIPPED | OUTPATIENT
Start: 2024-03-01

## 2024-03-01 RX ORDER — BLOOD-GLUCOSE,RECEIVER,CONT
EACH MISCELLANEOUS
Qty: 1 EACH | Refills: 0 | Status: SHIPPED | OUTPATIENT
Start: 2024-03-01

## 2024-03-01 NOTE — PROGRESS NOTES
CC: This 68 y.o. female presents for management of T2DM  and chronic conditions pending review including HTN, HLP    HPI: was diagnosed with T2DM in in . New to endocrine.  Admitted in  for bs of 500.   Family hx of DM: mother, brother  Fhx of thyroid disease: none  Denies missing doses of DM medication.   hypoglycemia at home: none  monitoring BG at home:  Fastins    Diet: Glucose is 160 in clinic.   BF-sausage  LH-chilli  DN-steak, peas  No snacks. Avoids sugary beverages.     Exercise: none. Walks in the store daily.    CURRENT DM MEDS: Levemir 30 u qhs, Novolog 5 u>200    Previous meds:  Metformin-dc at discharge in     Standards of Care:  Eye exam:  [facility]--didn't do DM exam  Podiatry exam:   DE: never    DEXA scan:   Spine: -1.7  Lfn: -1.7  Fx: 9.5%  Hfx: 1.2%    No falls, fractures or steriod injections.  Not taking ca or vd.    PMHx, PSHx: reviewed in epic.  Social Hx: no E/T use. Former smoker who quit in .     Wt Readings from Last 10 Encounters:   24 76.5 kg (168 lb 10.4 oz)   24 75.8 kg (167 lb)   24 76.2 kg (167 lb 15.9 oz)   01/15/24 78.2 kg (172 lb 6.4 oz)   24 72.6 kg (160 lb)   12/15/23 77.9 kg (171 lb 11.8 oz)   23 78.4 kg (172 lb 15.2 oz)   23 85.2 kg (187 lb 13.3 oz)   23 80.1 kg (176 lb 9.4 oz)   23 77.1 kg (170 lb)      ROS:   Gen: Appetite good, no weight gain or loss, denies fatigue and weakness.  Skin: Skin is intact and heals well, no rashes, no hair changes  Eyes: Denies visual disturbances  Resp: no SOB or URBAN, no cough  Cardiac: No palpitations, chest pain, no edema   GI: No nausea or vomiting, diarrhea, constipation, or abdominal pain.  /GYN: No nocturia, burning or pain.   MS/Neuro: Denies numbness/ tingling in BLE; Gait steady, speech clear  Psych: Denies drug/ETOH abuse, no hx of depression.  Other systems: negative.    /74 (BP Location: Right arm, Patient Position: Sitting, BP Method:  "Small (Manual))   Pulse 85   Temp 97.5 °F (36.4 °C) (Oral)   Ht 5' 2" (1.575 m)   Wt 76.5 kg (168 lb 10.4 oz)   SpO2 (!) 94%   BMI 30.85 kg/m²      PE:  GENERAL: Well developed, well nourished.  PSYCH: AAOx3, appropriate mood and affect, pleasant expression, conversant, appears relaxed, well groomed.   EYES: Conjunctiva, corneas clear  NECK: Supple, trachea midline,no thyromegaly or nodules  CHEST: Resp even and unlabored, CTA bilateral.  CARDIAC: RRR, S1, S2 heard, no murmurs    VASCULAR: DP pulses +2/4 bilaterally, no edema.  NEURO: ambulates with a cane, Gait steady  SKIN: Skin warm and dry no acanthosis nigracans.  3/24  Foot Exam: no sores or macerations noted.     Protective Sensation (w/ 10 gram monofilament):  Right: Intact  Left: Intact    Visual Inspection:  Normal -  Bilateral, Nails Intact - without Evidence of Foot Deformity- Bilateral, and Callus -  Bilateral    Pedal Pulses:   Right: Present  Left: Present    Posterior Tibialis Pulses:   Right:Present  Left: Present     Vibratory Sensation  Right:Decreased  Left:Decreased    Personally reviewed labs below:    No visits with results within 1 Month(s) from this visit.   Latest known visit with results is:   No results displayed because visit has over 200 results.            ASSESSMENT and PLAN:    1. Type 2 diabetes mellitus with hyperglycemia, with long-term current use of insulin  Ambulatory referral/consult to Diabetes Education    Renal Function Panel    Hemoglobin A1C    DEXCOM G7 SENSOR Katia    T4, Free    TSH    C-Peptide    Diabetes Mellitus Evaluation, Serum    Renal Function Panel    DEXCOM G7  Misc    POCT Glucose, Hand-Held Device      2. Hypertension associated with type 2 diabetes mellitus        3. Combined hyperlipidemia associated with type 2 diabetes mellitus        4. Subclinical hyperthyroidism  TSH    T4, Free    Thyroid Peroxidase Antibody    Thyrotropin Receptor Antibody      5. Postmenopausal  DXA Bone Density Axial " Skeleton 1 or more sites      6. Osteopenia, unspecified location  DXA Bone Density Axial Skeleton 1 or more sites      7. Morbid (severe) obesity due to excess calories             T2DM with hyperglycemia-  A1c today.    Medication Changes  Start Ozempic 0.25 mg once weekly x 4 weeks.  At week 5 increase to 0.5 mg weekly.   Continue novolog (orange/day) and Levemir (green/night).    Start Toujeo when you run out of Levemir.      Start dexcom G7.  Discussed DM, progression of disease, long term complications, tx options.   Discussed A1c and BG goals.   Reviewed  hypoglycemia, s/s and appropriate tx.   Instructed to monitor BG and bring meter/ log to every clinic visit.   - takes ASA, ACEi, statin    HTN - controlled, continue meds as previously prescribed and monitor.     HLP - LDL , on statin therapy, LFTs WNL  Postmenopausal/osteopenia-DEXA scan. Start wt bearing and resistance exercises. Start 1500 mg of ca and 2000 IU of vd.  Hypovitaminosis d-stable-check vitamin D  Obesity-Body mass index is 30.85 kg/m². Increase exercise to 30 min qd.       Follow-Up   Referral to DE  Fasting TFTs, TSI, TRAB, cpeptide, dme  F/u in 3 mths w/ labs prior -rp, TFTs, A1c and dexa

## 2024-03-01 NOTE — PATIENT INSTRUCTIONS
Medication Changes  Start Ozempic 0.25 mg once weekly x 4 weeks.  At week 5 increase to 0.5 mg weekly.   Continue novolog (orange/day) and Levemir (green/night).    Start Toujeo when you run out of Levemir.

## 2024-03-04 PROBLEM — N17.9 AKI (ACUTE KIDNEY INJURY): Status: RESOLVED | Noted: 2023-12-04 | Resolved: 2024-03-04

## 2024-03-13 ENCOUNTER — CLINICAL SUPPORT (OUTPATIENT)
Dept: DIABETES | Facility: CLINIC | Age: 69
End: 2024-03-13
Payer: MEDICARE

## 2024-03-13 DIAGNOSIS — E11.65 TYPE 2 DIABETES MELLITUS WITH HYPERGLYCEMIA, WITH LONG-TERM CURRENT USE OF INSULIN: ICD-10-CM

## 2024-03-13 DIAGNOSIS — Z79.4 TYPE 2 DIABETES MELLITUS WITH HYPERGLYCEMIA, WITH LONG-TERM CURRENT USE OF INSULIN: ICD-10-CM

## 2024-03-13 PROCEDURE — 99999 PR PBB SHADOW E&M-EST. PATIENT-LVL III: CPT | Mod: PBBFAC,HCNC,, | Performed by: NUTRITIONIST

## 2024-03-13 PROCEDURE — G0108 DIAB MANAGE TRN  PER INDIV: HCPCS | Mod: HCNC,S$GLB,, | Performed by: NUTRITIONIST

## 2024-03-13 NOTE — PROGRESS NOTES
Diabetes Care Specialist Progress Note  Author: Mabel Devine RD  Date: 3/13/2024    Referral 3/1/24    Program Intake  Reason for Diabetes Program Visit:: Initial Diabetes Assessment  Current diabetes risk level:: high (T2DM Outcomes Risk=3.5)  In the last 12 months, have you:: none  Permission to speak with others about care::  (Cherri MURO, here also today)  Continuous Glucose Monitoring  Patient has CGM: No    Lab Results   Component Value Date    HGBA1C 11.1 (H) 01/12/2024       Clinical    Patient Health Rating  Compared to other people your age, how would you rate your health?: Good    Problem Review  Reviewed Problem List with Patient: yes  Active comorbidities affecting diabetes self-care.: yes  Comorbidities: Hypertension, Cardiovascular Disease  Reviewed health maintenance: yes    Clinical Assessment  Current Diabetes Treatment: Injectable, Insulin, Oral Medication (To start Ozempic this week 0.25 mg for 4 weeks then increase to 0.5 mg weekly.  Pt to finish off Levemir 30 units HS then switch to Toujeo.  Novolog 5 units if BG>200 before meal)  Have you ever experienced hypoglycemia (low blood sugar)?: no  Have you ever experienced hyperglycemia (high blood sugar)?: yes  In the last month, how often have you experienced high blood sugar?: more than once a day  Are you able to tell when your blood sugar is high?: No (comment)  Have you ever been hospitalized because your blood sugar was high?: no    Medication Information  How do you obtain your medications?: Patient drives  How many days a week do you miss your medications?: Never  Do you sometimes have difficulty refilling your medications?: No  Medication adherence impacting ability to self-manage diabetes?: No    Labs  Do you have regular lab work to monitor your medications?: Yes  Type of Regular Lab Work: A1c, Cholesterol, CBC, Other  Where do you get your labs drawn?: [facility]  Lab Compliance Barriers: No    Nutritional Status  Diet: Regular  Meal Plan  24 Hour Recall: Lunch, Dinner, Snack, Breakfast (drinks water or Diet Coke)  Meal Plan 24 Hour Recall - Breakfast:  (1 package instant grits)  Meal Plan 24 Hour Recall - Lunch:  (chicken pot pie)  Meal Plan 24 Hour Recall - Dinner:  (sloppy joes on bun)  Meal Plan 24 Hour Recall - Snack:  (grapes or SF Jell-O)  Change in appetite?: No  Dentation:: Wears Dentures (does not interfer with oral intake)  Recent Changes in Weight: No Recent Weight Change  Current nutritional status an area of need that is impacting patient's ability to self-manage diabetes?: No    Additional Social History    Support  Does anyone support you with your diabetes care?: yes  Who supports you?: self, spouse, family member  Who takes you to your medical appointments?: self  Does the current support meet the patient's needs?: Yes  Is Support an area impacting ability to self-manage diabetes?: No    Access to Mass Media & Technology  Does the patient have access to any of the following devices or technologies?: Smart phone  Media or technology needs impacting ability to self-manage diabetes?: No    Cognitive/Behavioral Health  Alert and Oriented: Yes  Difficulty Thinking: No  Requires Prompting: No  Requires assistance for routine expression?: No  Cognitive or behavioral barriers impacting ability to self-manage diabetes?: No    Culture/Bahai  Culture or Sabianism beliefs that may impact ability to access healthcare: No    Communication  Language preference: English  Hearing Problems: No  Vision Problems: Yes  Vision problem type:: Decreased Vision  Vision Assistance: Glasses  Communication needs impacting ability to self-manage diabetes?: No    Health Literacy  Preferred Learning Method: Face to Face  How often do you need to have someone help you read instructions, pamphlets, or written material from your doctor or pharmacy?: Never  Health literacy needs impacting ability to self-manage diabetes?: No      Diabetes Self-Management Skills  Assessment    Diabetes Disease Process/Treatment Options  Patient/caregiver able to state what happens when someone has diabetes.: yes  Patient/caregiver knows what type of diabetes they have.: yes  Diabetes Type : Type II  Patient/caregiver able to identify at least three signs and symptoms of diabetes.: yes  Identified signs and symptoms:: blurred vision, fatigue, frequent infections, frequent urination, increased thirst  Patient able to identify at least three risk factors for diabetes.: yes  Identified risk factors:: age over 40, being overweight, family history, reduced activity  Diabetes Disease Process/Treatment Options: Skills Assessment Completed: Yes  Assessment indicates:: Adequate understanding  Area of need?: No    Nutrition/Healthy Eating  Method of carbohydrate measurement:: no method  Patient can identify foods that impact blood sugar.: yes  Patient-identified foods:: fruit/fruit juice, milk, soda, starchy vegetables (corn, peas, beans), starches (bread, pasta, rice, cereal), sweets, yogurt  Nutrition/Healthy Eating Skills Assessment Completed:: Yes  Assessment indicates:: Instruction Needed  Area of need?: Yes    Physical Activity/Exercise  Patient's daily activity level:: sedentary  Patient formally exercises outside of work.: no  Reasons for not exercising:: physically unable to exercise currently (walking with cane)  Patient can identify forms of physical activity.: yes  Stated forms of physical activity:: any movement performed by muscles that uses energy  Patient can identify reasons why exercise/physical activity is important in diabetes management.: yes  Identified reasons:: strengthens heart, muscles, and bones, tones muscles, lowers blood glucose, blood pressure, and cholesterol, lowers risk of heart disease and stroke, relieves stress  Physical Activity/Exercise Skills Assessment Completed: : Yes  Assessment indicates:: Adequate understanding  Area of need?: No    Medications  Patient is  able to describe current diabetes management routine.: yes  Diabetes management routine:: injectable medications, insulin, oral medications (To start Ozempic this week 0.25 mg for 4 weeks then increase to 0.5 mg weekly. Pt to finish off Levemir 30 units HS then switch to Toujeo. Novolog 5 units if BG>200 before meal)  Patient is able to identify current diabetes medications, dosages, and appropriate timing of medications.: yes  Patient understands the purpose of the medications taken for diabetes.: yes  Patient reports problems or concerns with current medication regimen.: yes  Medication regimen problems/concerns:: other (see comments) (does not know how to use Ozempic.  Confused about all insulin injections and amounts)  Medication Skills Assessment Completed:: Yes  Assessment indicates:: Instruction Needed  Area of need?: Yes    Home Blood Glucose Monitoring  Patient states that blood sugar is checked at home daily.: yes  Monitoring Method:: home glucometer  How often do you check your blood sugar?: 3 times a day  When do you check your blood sugar?: Before breakfast, Before lunch, Before dinner  When you check what is your typical blood sugar range? :  (fasting GV=[id number];  before other meals 140s)  Blood glucose logs:: no  Blood glucose logs reviewed today?: no  Home Blood Glucose Monitoring Skills Assessment Completed: : Yes  Assessment indicates:: Instruction Needed  Area of need?: Yes    Acute Complications  Acute Complications Skills Assessment Completed: : No  Deffered due to:: Time  Area of need?: Yes    Chronic Complications  Patient can identify major chronic complications of diabetes.: yes  Stated chronic complications:: heart disease/heart attack, kidney disease, neuropathy/nerve damage, retinopathy, stroke  Patient can identify ways to prevent or delay diabetes complications.: yes  Stated ways to prevent complications:: healthy eating and regular activity, controlling blood sugar  Patient is aware that  having diabetes increases risk of heart disease?: Yes  Patient is aware that heart disease is the leading cause of death and disability in people with diabetes?: Yes  Patient able to state risk factors for heart disease?: Yes  Patient stated risk factors for heart disease:: High blood pressure, High cholesterol, Diet, Limited activity, Medication non-adherance, Having diabetes  Patient is taking statin?: Yes  Chronic Complications Skills Assessment Completed: : Yes  Assessment indicates:: Adequate understanding  Area of need?: No    Psychosocial/Coping  Patient can identify ways of coping with chronic disease.: yes  Patient-stated ways of coping with chronic disease:: support from loved ones  Psychosocial/Coping Skills Assessment Completed: : Yes  Assessment indicates:: Adequate understanding  Area of need?: No      Assessment Summary and Plan    Based on today's diabetes care assessment, the following areas of need were identified:          3/13/2024    12:01 AM   Social   Support No   Access to Mass Media/Tech No   Cognitive/Behavioral Health No   Culture/Protestant No   Communication No   Health Literacy No            3/13/2024    12:01 AM   Clinical   Medication Adherence No   Lab Compliance No   Nutritional Status No            3/13/2024    12:01 AM   Diabetes Self-Management Skills   Diabetes Disease Process/Treatment Options No   Nutrition/Healthy Eating Yes--see Care Plan   Physical Activity/Exercise No   Medication Yes--see Care Plan   Home Blood Glucose Monitoring Yes--see Care Plan; to start Dexcom G7 next week   Acute Complications Yes--review as needed at future appt   Chronic Complications No   Psychosocial/Coping No          Today's interventions were provided through individual discussion, instruction, and written materials were provided.      Patient verbalized understanding of instruction and written materials.  Pt was able to return back demonstration of instructions today. Patient understood key  points, needs reinforcement and further instruction.     Diabetes Self-Management Care Plan:    Today's Diabetes Self-Management Care Plan was developed with Celine's input. Celine has agreed to work toward the following goal(s) to improve his/her overall diabetes control.      Care Plan: Diabetes Management   Updates made since 2/12/2024 12:00 AM        Problem: Blood Glucose Self-Monitoring         Goal: Patient will use Dexcom G7 CGM to continuously monitor blood glucose levels daily    Start Date: 3/13/2024   Expected End Date: 6/13/2024   Priority: Level 2   Barriers: No Barriers Identified   Note:    Pt has been manually checking BG levels 3 times daily but did not bring in BG log.  Pt states she has received CGM supplies but did not bring or even open box. Order was placed for Dexcom G7; pt indicates it does not connect to her phone so she will use .  Appt made for 3/18/24 for Dexcom start.  Until then, pt will continue with manual injections.            Problem: Healthy Eating         Goal: Eat 3 meals daily with 30 g/2 servings of Carbohydrate per meal.    Start Date: 3/13/2024   Expected End Date: 6/13/2024   Priority: Medium   Barriers: No Barriers Identified   Note:    Pt has small appetite and is doing fairly well with oral intake.  Used today as a review of healthy eating with focus on how to determine appropriate portions of carbs and combining carbs w/PRO.  Pt knows that non-starchy veggies are healthy and should be increased in diet.   Drinks only water or Diet coke.  Will try to incorporate PRO with b'fast (grits) and snacks (if having fruit)       Task: Reviewed the sources and role of Carbohydrate, Protein, and Fat and how each nutrient impacts blood sugar. Completed 3/13/2024        Task: Provided visual examples using dry measuring cups, food models, and other familiar objects such as computer mouse, deck or cards, tennis ball etc. to help with visualization of portions. Completed  3/13/2024        Task: Explained how to count carbohydrates using the food label and the use of dry measuring cups for accurate carb counting. Completed 3/13/2024        Task: Review the importance of balancing carbohydrates with each meal using portion control techniques to count servings of carbohydrate and label reading to identify serving size and amount of total carbs per serving. Completed 3/13/2024        Task: Provided Sample plate method and reviewed the use of the plate to estimate amounts of carbohydrate per meal. Completed 3/13/2024        Problem: Medications         Goal: Patient Agrees to take Diabetes Medication(s) as prescribed.    Start Date: 3/13/2024   Expected End Date: 6/13/2024   Priority: High   Barriers: No Barriers Identified   Note:    Pt came in with all her meds stating she is confused about what to do.  Reviewed all meds, explaining how to take and when. Printed out instructions for her to take with her.  She confirmed that she understood.    Medication Changes  Start Ozempic 0.25 mg once weekly x 4 weeks.  At week 5 increase to 0.5 mg weekly.     Continue Novolog (orange/day) 5 units BEFORE eating IF blood sugars>200    Continue Levemir (green/night) 30 units until you run out then  start Toujeo (30 units) instead of Levemir.      DIABETES EDUCATOR NOTE: TRAINING FOR OZEMPIC START  ?  Referring Provider: ESSENCE Back  The patient is here in clinic today to attend an individual appointment for training on once a week Ozempic Pen injections. Patient was given background information on Ozempic and how it works. Covered in detail how to use the Ozempic pen (timing - when to take it, meal planning, storage, and pen replacement). Discussed what to expect: side effects, possible hypoglycemia, and blood sugar control. Reviewed causes of hypoglycemia including signs, symptoms and treatment options.  ?  Instructed to dial starting dose of provider (0.25 mg/1.5 mL) and, place a new pen needle  on Ozempic pen.  Wipe area to be injected with alcohol or clean with soap and water. Place pen in area to be injected and press button to deliver dose.    ?  Patient performed successful return demonstration.  ?     Task: Reviewed with patient all current diabetes medications and provided basic review of the purpose, dosage, frequency, side effects, and storage of both oral and injectable diabetes medications. Completed 3/13/2024        Task: Instructed patient on how to self-administer Insulin and Ozempic Completed 3/13/2024        Task: Discussed guidelines for preventing, detecting and treating hypoglycemia and hyperglycemia and reviewed the importance of meal and medication timing with diabetes mediations for prevention of hypoglycemia and maximum drug benefit. Completed 3/13/2024          Follow Up Plan     Follow up in about 5 days (around 3/18/2024) for Will train pt on use of Dexcom g7 using  on 3/18/24.    Today's care plan and follow up schedule was discussed with patient.  Celine verbalized understanding of the care plan, goals, and agrees to follow up plan.        The patient was encouraged to communicate with his/her health care provider/physician and care team regarding his/her condition(s) and treatment.  I provided the patient with my contact information today and encouraged to contact me via phone or [facility]'s Patient Portal as needed.     Length of Visit   Total Time: 60 Minutes

## 2024-03-18 ENCOUNTER — NUTRITION (OUTPATIENT)
Dept: DIABETES | Facility: CLINIC | Age: 69
End: 2024-03-18
Payer: MEDICARE

## 2024-03-18 DIAGNOSIS — E11.65 TYPE 2 DIABETES MELLITUS WITH HYPERGLYCEMIA, WITH LONG-TERM CURRENT USE OF INSULIN: Primary | ICD-10-CM

## 2024-03-18 DIAGNOSIS — Z79.4 TYPE 2 DIABETES MELLITUS WITH HYPERGLYCEMIA, WITH LONG-TERM CURRENT USE OF INSULIN: Primary | ICD-10-CM

## 2024-03-18 PROCEDURE — 99999 PR PBB SHADOW E&M-EST. PATIENT-LVL III: CPT | Mod: PBBFAC,HCNC,, | Performed by: NUTRITIONIST

## 2024-03-18 PROCEDURE — G0108 DIAB MANAGE TRN  PER INDIV: HCPCS | Mod: HCNC,S$GLB,, | Performed by: NUTRITIONIST

## 2024-03-18 NOTE — PROGRESS NOTES
Diabetes Care Specialist Progress Note  Author: Mabel Devine RD  Date: 3/18/2024    Referral 3/1/24  Initial DM Assessment 3/13/24    Program Intake  Reason for Diabetes Program Visit:: Intervention  Type of Intervention:: Individual  Individual: Device Training  Device Training: Personal CGM (Dexcom G7 via )  Current diabetes risk level:: high (T2DM Outcomes Risk=3)  In the last 12 months, have you:: none  Permission to speak with others about care:: no  Continuous Glucose Monitoring  Patient has CGM: Yes  Personal CGM type::  (Dexcom G7 via ; start/train today)    Lab Results   Component Value Date    HGBA1C 11.1 (H) 01/12/2024       Clinical    Clinical Assessment  Current Diabetes Treatment: Injectable, Insulin (Ozempic 0.25 mg to start today; Levemir 30 units HS (will transition to Toujeo when out of Levemir); Novolog 5 units if BG>200)  Have you ever experienced hypoglycemia (low blood sugar)?: no  Have you ever experienced hyperglycemia (high blood sugar)?: yes  In the last month, how often have you experienced high blood sugar?: more than once a day  Are you able to tell when your blood sugar is high?: No (comment)  Have you ever been hospitalized because your blood sugar was high?: no    Medication Information  How do you obtain your medications?: Patient drives  How many days a week do you miss your medications?: Never  Do you sometimes have difficulty refilling your medications?: No  Medication adherence impacting ability to self-manage diabetes?: No    Labs  Do you have regular lab work to monitor your medications?: Yes  Type of Regular Lab Work: A1c, Cholesterol, CBC, Other  [facility]  Lab Compliance Barriers: No    Diabetes Self-Management Skills Assessment     Home Blood Glucose Monitoring  Patient states that blood sugar is checked at home daily.: yes  Monitoring Method:: personal continuous glucose monitor  Personal CGM type::  (Dexcom G7 via  ; start/train today)  Home Blood Glucose Monitoring Skills Assessment Completed: : Yes  Assessment indicates:: Instruction Needed  Area of need?: Yes    Assessment Summary and Plan    Based on today's diabetes care assessment, the following areas of need were identified:          3/13/2024    12:01 AM   Social   Support No   Access to Mass Media/Tech No   Cognitive/Behavioral Health No   Culture/Anglican No   Communication No   Health Literacy No            3/18/2024    12:01 AM   Clinical   Medication Adherence No   Lab Compliance No            3/18/2024    12:01 AM   Diabetes Self-Management Skills   Home Blood Glucose Monitoring Yes--see Care Plan      Medications                                                                      Reviewed how to administer Ozempic again; pt just                                                                                              picked up medication and was asking for another review                                                                                              on how to administer. Also reminded pt to keep toujeo                                                                                              refrigerated until use      Today's interventions were provided through individual discussion, instruction, and written materials were provided.      Patient verbalized understanding of instruction and written materials.  Pt was able to return back demonstration of instructions today. Patient understood key points, needs reinforcement and further instruction.     Diabetes Self-Management Care Plan:    Today's Diabetes Self-Management Care Plan was developed with Celine's input. Celine has agreed to work toward the following goal(s) to improve his/her overall diabetes control.      Care Plan: Diabetes Management   Updates made since 2/17/2024 12:00 AM        Problem: Blood Glucose Self-Monitoring         Goal: Patient will use Dexcom G7 CGM to  "continuously monitor blood glucose levels daily    Start Date: 3/13/2024   Expected End Date: 6/13/2024   Priority: Level 2   Barriers: No Barriers Identified   Note:    Pt has been manually checking BG levels 3 times daily but did not bring in BG log.  Pt states she has received CGM supplies but did not bring or even open box. Order was placed for Dexcom G7; pt indicates it does not connect to her phone so she will use .  Appt made for 3/18/24 for Dexcom start.  Until then, pt will continue with manual injections.    3/18/24--Pt arrives today to start/train for Dexcom G7    DEXCOM G7 CGM TRAINING  Dexcom G7 mobile apps downloaded to phone. Education was provided using "Quick Start Guide" and demo device per Dexcom protocol. [facility] data share set-up.    Patient will use Dexcom G7  to receive continuous glucose data.        Overview:  5 minute glucose reading updates, trending arrows, BG graph screens, reports screen,  connectivity and functions.   Menus: Trend graph, start sensor, enter BG, events, alerts, settings, replace sensor, stop sensor, and shutdown  Dexcom G7 mobile leslee/ Settings:                          * Urgent Low: 55 mg/dL                          * Urgent Low Soon: On                          * Low Alert: 100 mg/dL; Snooze 15 min                          * High Alert: 280 mg/dL; Snooze OFF                           * Signal Loss: OFF                          * Brief Sensor Issue: OFF     Reviewed additional setting options.  Patient paired sensor using 4-digit code listed on sensor cap..    Reviewed where to find sensor insertion time and expiration date.   Reviewed appropriate calibration techniques.  Reviewed sensor site selection. Patient selected and prepped site using aseptic technique, inserted sensor, applied overlay tape and started session.   Reviewed sensor removal from site. Discussed times to remove sensor per  guidelines include MRI or " diatherapy.   Patient able to demonstrate without difficulty. Encouraged to review manual and/or training videos prior to starting another sensor.   Reviewed problem solving aspects of sensor transmission/variables that can disrupt RF transmission.  range 20 feet, but the first 3 hrs keep within 3 feet of transmitter.  Patient instructed on lag time of interstitial fluid from CBG and was advised to treat hypoglycemia and dose insulin based on SMBG values.  Dexcom technical support contact number given and examples of when to contact them discussed.     Supplies From:  [id number] [phone]           Problem: Healthy Eating         Goal: Eat 3 meals daily with 30 g/2 servings of Carbohydrate per meal.    Start Date: 3/13/2024   Expected End Date: 6/13/2024   Priority: Medium   Barriers: No Barriers Identified   Note:    Pt has small appetite and is doing fairly well with oral intake.  Used today as a review of healthy eating with focus on how to determine appropriate portions of carbs and combining carbs w/PRO.  Pt knows that non-starchy veggies are healthy and should be increased in diet.   Drinks only water or Diet coke.  Will try to incorporate PRO with b'fast (grits) and snacks (if having fruit)       Task: Reviewed the sources and role of Carbohydrate, Protein, and Fat and how each nutrient impacts blood sugar. Completed 3/13/2024        Task: Provided visual examples using dry measuring cups, food models, and other familiar objects such as computer mouse, deck or cards, tennis ball etc. to help with visualization of portions. Completed 3/13/2024        Task: Explained how to count carbohydrates using the food label and the use of dry measuring cups for accurate carb counting. Completed 3/13/2024        Task: Review the importance of balancing carbohydrates with each meal using portion control techniques to count servings of carbohydrate and label reading to identify serving size and amount of total  carbs per serving. Completed 3/13/2024        Task: Provided Sample plate method and reviewed the use of the plate to estimate amounts of carbohydrate per meal. Completed 3/13/2024        Problem: Medications         Goal: Patient Agrees to take Diabetes Medication(s) as prescribed.    Start Date: 3/13/2024   Expected End Date: 6/13/2024   Priority: High   Barriers: No Barriers Identified   Note:    Pt came in with all her meds stating she is confused about what to do.  Reviewed all meds, explaining how to take and when. Printed out instructions for her to take with her.  She confirmed that she understood.    Medication Changes  Start Ozempic 0.25 mg once weekly x 4 weeks.  At week 5 increase to 0.5 mg weekly.     Continue Novolog (orange/day) 5 units BEFORE eating IF blood sugars>200    Continue Levemir (green/night) 30 units until you run out then  start Toujeo (30 units) instead of Levemir.       Task: Reviewed with patient all current diabetes medications and provided basic review of the purpose, dosage, frequency, side effects, and storage of both oral and injectable diabetes medications. Completed 3/13/2024        Task: Instructed patient on how to self-administer  Completed 3/13/2024        Task: Discussed guidelines for preventing, detecting and treating hypoglycemia and hyperglycemia and reviewed the importance of meal and medication timing with diabetes mediations for prevention of hypoglycemia and maximum drug benefit. Completed 3/13/2024          Follow Up Plan     Follow up in about 3 weeks (around 4/8/2024) for f/u on 4/4/24 to download G7  for the first time; review data.    Today's care plan and follow up schedule was discussed with patient.  Celine verbalized understanding of the care plan, goals, and agrees to follow up plan.        The patient was encouraged to communicate with his/her health care provider/physician and care team regarding his/her condition(s) and treatment.  I provided the  patient with my contact information today and encouraged to contact me via phone or [facility] Patient Portal as needed.     Length of Visit   Total Time: 75 Minutes

## 2024-04-01 NOTE — PROGRESS NOTES
Patient ID:  Celine Steen  68 y.o.  female      Assessment:       1. Coronary artery disease involving native coronary artery of native heart without angina pectoris    2. Heart failure with reduced ejection fraction    3. Primary hypertension    4. Mixed hyperlipidemia    5. ICD (implantable cardioverter-defibrillator) in place      Angiogram on 1/12/2024 showed the 2 vein grafts were occluded and LIMA to LAD was atretic.  of proximal RCA with collaterals. 60% proximal LAD disease. 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 Distal LCx also has 70-80% disease. The patient decided to pursue medical management.   LVEF 35% (long term history of HFrEF)    Plan:     Currently stable. Asymptomatic. Appears euvolemic.   Continue aspirin 81 mg daily and Plavix 75 mg daily - DAPT for 12 months for NSTEMI (Jan, 2024) unless any bleeding/anemia issues.  Continue Ranexa 500 mg BID.   Continue Simvastatin 40 mg daily. Last LDL 75 in Jan, 2024. She has had lower LDL in the past. She attributes it to her diet and wants to make some changes in that before medication change.  Continue Toprol 100 mg daily and Lisinopril 10 mg daily. Optimize further for HFrEF as tolerated in the future.  Continue Lasix 40 mg BID and potassium supplement. Maintaining euvolemia with this dose.  Counseled on daily fluid and sodium restrictions; <1.5-2L and 2g respectively.  Counseled on heart healthy lifestyle and ASCVD risk factor control.  Routine device check up.    Subjective:     Chief Complaint   Patient presents with    Hospital Follow Up       HPI:  Celine Steen is a 68 y.o. female who presents for hospital follow up. She has history of CAD s/p CABG (2007 or 2008), HFrEF (LVEF 35%) - has ICD, HTN, HLD, DM II and PVD. She was in the hospital in Jan, 2024 with SOB and vomiting. Had moderate troponin elevation. Underwent coronary and bypass angiography on 1/12/2024 which showed the 2 vein grafts were occluded and LIMA to LAD was atretic.  of  proximal RCA with collaterals. 60% proximal LAD disease. 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 Distal LCx also has disease. The patient decided to pursue medical management.    She reports doing well. Reports no chest pain, dyspnea, orthopnea, PND, swelling of extremities, palpitations, syncope or near syncope. Able to perform ADLs without any symptoms.      PREVIOUS CARDIAC TESTING/PROCEDURES HISTORY:  Most Recent Echocardiogram Results  Results for orders placed during the hospital encounter of 01/12/24    Echo    Interpretation Summary    Left Ventricle: The left ventricle is normal in size. Normal wall thickness. Anterior apical hypokinesis See diagram for wall motion findings. There is mildly reduced systolic function. Ejection fraction by visual approximation is 35%. Grade II diastolic dysfunction.    Right Ventricle: Normal right ventricular cavity size. Wall thickness is normal. Right ventricle wall motion  is normal. Systolic function is normal.    Left Atrium: Left atrium is mildly dilated.    Right Atrium: Right atrium is mildly dilated.    Tricuspid Valve: There is mild to moderate regurgitation.    IVC/SVC: Normal venous pressure at 3 mmHg.    The estimated pulmonary artery systolic pressure is 33 mmHg.    Definity contrast was used with no apical thrombus      Most Recent Stress Test Results  Results for orders placed during the hospital encounter of 05/23/22    Nuclear Stress - Cardiology Interpreted    Interpretation Summary    Abnormal myocardial perfusion scan.    There is a moderate intensity, fixed perfusion abnormality consistent with scar in the anteroapical wall(s).    The gated perfusion images showed an ejection fraction of 47% post stress. Normal ejection fraction is greater than 53%.    LV cavity size is mildly enlarged at rest and mildly enlarged at stress.    The EKG portion of this study is negative for ischemia.    The patient reported no chest pain during the stress test.    There  were no arrhythmias during stress.    Wall motion abnormalities involving anterior apex        Most Recent Cardiovascular Angiogram results  Results for orders placed during the hospital encounter of 01/12/24    Cardiac catheterization    Conclusion  Left main coronary artery:  Large caliber.  30% ostial stenosis.    Left anterior descending artery:  Large caliber.  Prior stent in proximal to mid LAD is patent with mild diffuse ISR and 60-70% focal stenosis at the proximal stent edge.  MARILY 3 flow noted in the vessel.  No competitive flow from LIMA is noted.    Left circumflex artery:  Large caliber.  Gives rise to a small OM1, large caliber OM2, medium caliber OM3 and small to medium caliber LPL branches.  There is 60% focal stenosis in proximal LCX with 3 focal lesions associated with post stenotic dilatation.  Distal LCX has 70-80% tubular stenosis.  OM2 has 30% ostial stenosis.  Patent stent in OM2 is noted.  The stent is after the graft anastomosis site.  Retrograde flow at the anastomosis site into the graft from the native vessel is noted.    Right coronary artery:  Dominant.  Calcified.  Chronic total occlusion in the proximal and mid segment.  Distal RCA and RPDA fills via left to right collaterals.  RPDA is diffusely diseased.  RPL has  proximally and distally fills via left-to-right collaterals.    GRAFTS:  LIMA to LAD:  Atretic with no flow in its distal segment.  Graft to RCA:  100% occluded in its proximal segment.  Graft to OM:  Likely flush occluded.  Unable to engage selectively.  Unable to see on aortogram.        The procedure log was documented by Documenter: Bita Terry RN and verified by Dana Henriquez MD.    Date: 1/30/2024  Time: 12:49 AM      Other Most Recent Cardiology Results  Results for orders placed during the hospital encounter of 01/12/24    CARDIAC MONITORING STRIPS      CARDIAC SURGERY:    Most Recent EKG Results  Results for orders placed or performed during the hospital  encounter of 24   EKG 12-LEAD on arrival to floor    Collection Time: 24 12:43 PM    Narrative    Test Reason : I25.10,    Vent. Rate : 071 BPM     Atrial Rate : 071 BPM     P-R Int : 168 ms          QRS Dur : 076 ms      QT Int : 486 ms       P-R-T Axes : 073 076 151 degrees     QTc Int : 528 ms    Normal sinus rhythm  Septal infarct (cited on or before 18-SEP-2008)  T wave abnormality, consider inferior ischemia  T wave abnormality, consider anterolateral ischemia  Prolonged QT  Abnormal ECG  When compared with ECG of 2024 16:54,  Premature atrial complexes are no longer Present  T wave inversion more evident in Anterior-lateral leads  QT has lengthened  Confirmed by Adali MI, Bull HICKS ([id number]) on 2024 11:35:53 AM    Referred By: [email or web address]   SELF           Confirmed By:Bull Bro MD       The ASCVD Risk score (Aftab DK, et al., 2019) failed to calculate for the following reasons:    The patient has a prior MI or stroke diagnosis    Review of patient's allergies indicates:  No Known Allergies    Past Medical History:   Diagnosis Date    Anxiety     CAD (coronary artery disease)     CHF (congestive heart failure)     Chronic low back pain     Diabetes 2012    Diabetes mellitus     Hyperkalemia 2022    Hypertension     Leukocytosis 2022    Metabolic acidosis with respiratory acidosis 2022     Past Surgical History:   Procedure Laterality Date    ANGIOGRAM, CORONARY, WITH LEFT HEART CATHETERIZATION N/A 2024    Procedure: Angiogram, Coronary, with Left Heart Cath;  Surgeon: Dana Henriquez MD;  Location: [facility] CATH/EP LAB;  Service: Cardiology;  Laterality: N/A;    CARDIAC DEFIBRILLATOR PLACEMENT      CORONARY ARTERY BYPASS GRAFT      HYSTERECTOMY       Social History     Tobacco Use    Smoking status: Former     Current packs/day: 0.00     Types: Cigarettes     Quit date: 2018     Years since quittin.2    Smokeless tobacco: Never   Substance Use  Topics    Alcohol use: No          REVIEW OF SYSTEMS  CONSTITUTIONAL: No chills, no fatigue, no fever.   EYES: No double vision, no blurred vision  NEURO: No headaches, no dizziness  RESPIRATORY: No cough, no wheezing.    CARDIOVASCULAR: See HPI   GI: No abdominal pain, no melena, no diarrhea, no nausea or vomiting.   : No  dysuria and frequency, no hematuria  SKIN: no bruising, no discoloration  ENDOCRINE: no polyphagia, no heat intolerance, no cold intolerance  PSYCHIATRIC: no depression, no anxiety, no memory loss  MUSCULOSKELETAL: no  neck pain, no muscle weakness,no back pain          Objective:        Vitals:    02/27/24 1405   BP: 122/60   Pulse: 84       PHYSICAL EXAM  CONSTITUTIONAL: In no apparent distress  HEENT: Normocephalic. Pupils normal and conjunctivae normal. No pallor  NECK: No JVD  LUNGS: B/L air entry to the lungs, clear to auscultation. No rales, wheezing or rhonchi.  HEART: Normal rate and regular rhythm. Normal S1 and S2.  No murmur   ABDOMEN: soft, non-tender; bowel sounds normal  EXTREMITIES: No edema. Good palpable distal pulses.  NEURO: AAO X 3, no gross sensory or motor deficits  SKIN:  No rash  Psych:  Normal affect    Lab Results   Component Value Date    WBC 9.99 01/15/2024    HGB 12.0 01/15/2024    HCT 37.8 01/15/2024     01/15/2024    CHOL 143 01/12/2024    TRIG 113 01/12/2024    HDL 45 01/12/2024    ALT 16 01/12/2024    AST 15 01/12/2024     (L) 01/15/2024    K 4.2 01/15/2024     01/15/2024    CREATININE 1.0 01/15/2024    BUN 23 01/15/2024    CO2 25 01/15/2024    TSH 0.069 (L) 01/12/2024    INR 1.0 01/13/2024    HGBA1C 11.1 (H) 01/12/2024        @  Lab Results   Component Value Date    CHOL 143 01/12/2024    CHOL 148 12/01/2023    CHOL 149 05/12/2023     Lab Results   Component Value Date    HDL 45 01/12/2024    HDL 37 (L) 12/01/2023    HDL 38 (L) 05/12/2023     Lab Results   Component Value Date    LDLCALC 75.4 01/12/2024    LDLCALC 85.0 12/01/2023    LDLCALC  "60.8 (L) 05/12/2023     No results found for: "DLDL"  Lab Results   Component Value Date    TRIG 113 01/12/2024    TRIG 130 12/01/2023    TRIG 251 (H) 05/12/2023       f1   Lab Results   Component Value Date    CHOLHDL 31.5 01/12/2024    CHOLHDL 25.0 12/01/2023    CHOLHDL 25.5 05/12/2023            Current Outpatient Medications   Medication Instructions    aspirin (ECOTRIN) 81 mg, Oral, Daily,      azelastine (ASTELIN) 137 mcg, Nasal, 2 times daily    blood sugar diagnostic (FREESTYLE LITE STRIPS) Strp 1 each, Misc.(Non-Drug; Combo Route), 4 times daily    blood-glucose meter kit Use as instructed    clopidogreL (PLAVIX) 75 mg, Oral, Daily    coenzyme Q10 200 mg, Oral, 2 times daily,      DEXCOM G7  Misc Use to monitor glucose.    DEXCOM G7 SENSOR Kaita Change every 10 days.    EScitalopram oxalate (LEXAPRO) 20 MG tablet TAKE 1/2 TABLET BY MOUTH TWICE DAILY    furosemide (LASIX) 40 mg, Oral, 2 times daily    insulin glargine, TOUJEO, (TOUJEO SOLOSTAR U-300 INSULIN) 300 unit/mL (1.5 mL) InPn pen Inject 30 units nightly.    lancets (LANCETS,THIN) Misc 1 each, Misc.(Non-Drug; Combo Route), 4 times daily    lisinopriL 10 mg, Oral, Daily    metoprolol succinate (TOPROL-XL) 100 mg, Oral, Daily    mirtazapine (REMERON) 7.5 mg, Oral, Nightly    nitroGLYCERIN (NITROSTAT) 0.3 mg, Sublingual, Every 5 min PRN    omega-3 fatty acids/fish oil (FISH OIL-OMEGA-3 FATTY ACIDS) 300-1,000 mg capsule 1 capsule, Oral, Daily    ondansetron (ZOFRAN-ODT) 4 mg, Oral, Every 8 hours PRN    pen needle, diabetic (DROPLET PEN NEEDLE) 32 gauge x 5/32" Ndle 1 each, Misc.(Non-Drug; Combo Route), 3 times daily    potassium citrate 99 mg Cap 1 capsule, Oral, Daily    ranolazine (RANEXA) 500 mg, Oral, 2 times daily    semaglutide (OZEMPIC) 0.25 mg or 0.5 mg (2 mg/3 mL) pen injector Inject 0.25 mg into the skin every 7 days for 30 days, THEN 0.5 mg every 7 days.    simvastatin (ZOCOR) 40 mg, Oral, Nightly       Medication List with " Changes/Refills   New Medications    DEXCOM G7  MISC    Use to monitor glucose.    DEXCOM G7 SENSOR SAGAR    Change every 10 days.    INSULIN GLARGINE, TOUJEO, (TOUJEO SOLOSTAR U-300 INSULIN) 300 UNIT/ML (1.5 ML) INPN PEN    Inject 30 units nightly.    SEMAGLUTIDE (OZEMPIC) 0.25 MG OR 0.5 MG (2 MG/3 ML) PEN INJECTOR    Inject 0.25 mg into the skin every 7 days for 30 days, THEN 0.5 mg every 7 days.   Current Medications    ASPIRIN (ECOTRIN) 81 MG EC TABLET    Take 81 mg by mouth once daily.    AZELASTINE (ASTELIN) 137 MCG (0.1 %) NASAL SPRAY    1 spray (137 mcg total) by Nasal route 2 (two) times daily. for 7 days    BLOOD SUGAR DIAGNOSTIC (FREESTYLE LITE STRIPS) STRP    1 each by Misc.(Non-Drug; Combo Route) route 4 (four) times daily.    BLOOD-GLUCOSE METER KIT    Use as instructed    CLOPIDOGREL (PLAVIX) 75 MG TABLET    Take 1 tablet (75 mg total) by mouth once daily.    COENZYME Q10 200 MG CAPSULE    Take 200 mg by mouth 2 (two) times daily.    ESCITALOPRAM OXALATE (LEXAPRO) 20 MG TABLET    TAKE 1/2 TABLET BY MOUTH TWICE DAILY    FUROSEMIDE (LASIX) 40 MG TABLET    Take 1 tablet (40 mg total) by mouth 2 (two) times daily.    LANCETS (LANCETS,THIN) MISC    1 each by Misc.(Non-Drug; Combo Route) route 4 (four) times daily.    LISINOPRIL 10 MG TABLET    Take 1 tablet (10 mg total) by mouth once daily.    METOPROLOL SUCCINATE (TOPROL-XL) 50 MG 24 HR TABLET    Take 2 tablets (100 mg total) by mouth once daily.    MIRTAZAPINE (REMERON) 7.5 MG TAB    Take 1 tablet (7.5 mg total) by mouth every evening.    NITROGLYCERIN (NITROSTAT) 0.3 MG SL TABLET    Place 1 tablet (0.3 mg total) under the tongue every 5 (five) minutes as needed for Chest pain.    OMEGA-3 FATTY ACIDS/FISH OIL (FISH OIL-OMEGA-3 FATTY ACIDS) 300-1,000 MG CAPSULE    Take 1 capsule by mouth once daily.    ONDANSETRON (ZOFRAN-ODT) 4 MG TBDL    Take 1 tablet (4 mg total) by mouth every 8 (eight) hours as needed (nauseau).    PEN NEEDLE, DIABETIC  "(DROPLET PEN NEEDLE) 32 GAUGE X 5/32" NDLE    1 each by Misc.(Non-Drug; Combo Route) route 3 (three) times daily.    POTASSIUM CITRATE 99 MG CAP    Take 1 capsule by mouth once daily.    SIMVASTATIN (ZOCOR) 40 MG TABLET    Take 1 tablet (40 mg total) by mouth every evening.   Changed and/or Refilled Medications    Modified Medication Previous Medication    RANOLAZINE (RANEXA) 500 MG TB12 ranolazine (RANEXA) 500 MG Tb12       Take 1 tablet (500 mg total) by mouth 2 (two) times daily.    Take 1 tablet (500 mg total) by mouth 2 (two) times daily.   Discontinued Medications    INSULIN DETEMIR U-100, LEVEMIR, 100 UNIT/ML (3 ML) SUBQ INPN PEN    Inject 30 Units into the skin every evening.               All pertinent labs, imaging, and EKGs reviewed.  Patient's most recent EKG tracing was personally interpreted by this provider.    Problem List Items Addressed This Visit          Cardiac/Vascular    CAD (coronary artery disease) - Primary (Chronic)    ICD (implantable cardioverter-defibrillator) in place (Chronic)    Overview     [facility] unit         Chronic combined systolic and diastolic heart failure (Chronic)    Anginal equivalent     Other Visit Diagnoses       Mixed hyperlipidemia                Follow up in about 2 months (around 4/27/2024).     "

## 2024-04-17 ENCOUNTER — LAB VISIT (OUTPATIENT)
Dept: LAB | Facility: HOSPITAL | Age: 69
End: 2024-04-17
Payer: MEDICARE

## 2024-04-17 DIAGNOSIS — Z79.4 TYPE 2 DIABETES MELLITUS WITH HYPERGLYCEMIA, WITH LONG-TERM CURRENT USE OF INSULIN: ICD-10-CM

## 2024-04-17 DIAGNOSIS — E11.65 TYPE 2 DIABETES MELLITUS WITH HYPERGLYCEMIA, WITH LONG-TERM CURRENT USE OF INSULIN: ICD-10-CM

## 2024-04-17 LAB
ESTIMATED AVG GLUCOSE: 166 MG/DL (ref 68–131)
HBA1C MFR BLD: 7.4 % (ref 4–5.6)

## 2024-04-17 PROCEDURE — 36415 COLL VENOUS BLD VENIPUNCTURE: CPT | Mod: HCNC,PO

## 2024-04-17 PROCEDURE — 83036 HEMOGLOBIN GLYCOSYLATED A1C: CPT | Mod: HCNC

## 2024-04-19 ENCOUNTER — HOSPITAL ENCOUNTER (EMERGENCY)
Facility: HOSPITAL | Age: 69
Discharge: HOME | End: 2024-04-20
Attending: EMERGENCY MEDICINE
Payer: MEDICARE

## 2024-04-19 VITALS
RESPIRATION RATE: 18 BRPM | BODY MASS INDEX: 29.59 KG/M2 | WEIGHT: 167 LBS | TEMPERATURE: 100 F | SYSTOLIC BLOOD PRESSURE: 122 MMHG | DIASTOLIC BLOOD PRESSURE: 63 MMHG | HEART RATE: 95 BPM | OXYGEN SATURATION: 95 % | HEIGHT: 63 IN

## 2024-04-19 DIAGNOSIS — M10.9 GOUT, UNSPECIFIED CAUSE, UNSPECIFIED CHRONICITY, UNSPECIFIED SITE: Primary | ICD-10-CM

## 2024-04-19 PROCEDURE — 25000003 PHARM REV CODE 250: Performed by: EMERGENCY MEDICINE

## 2024-04-19 PROCEDURE — 99284 EMERGENCY DEPT VISIT MOD MDM: CPT

## 2024-04-19 PROCEDURE — 84550 ASSAY OF BLOOD/URIC ACID: CPT | Performed by: EMERGENCY MEDICINE

## 2024-04-19 RX ORDER — HYDROCODONE BITARTRATE AND ACETAMINOPHEN 10; 325 MG/1; MG/1
1 TABLET ORAL EVERY 6 HOURS PRN
Qty: 12 TABLET | Refills: 0 | Status: SHIPPED | OUTPATIENT
Start: 2024-04-19 | End: 2024-04-22

## 2024-04-19 RX ORDER — HYDROCODONE BITARTRATE AND ACETAMINOPHEN 10; 325 MG/1; MG/1
1 TABLET ORAL
Status: COMPLETED | OUTPATIENT
Start: 2024-04-19 | End: 2024-04-19

## 2024-04-19 RX ORDER — HYDROCODONE BITARTRATE AND ACETAMINOPHEN 10; 325 MG/1; MG/1
1 TABLET ORAL
Status: DISCONTINUED | OUTPATIENT
Start: 2024-04-19 | End: 2024-04-19

## 2024-04-19 RX ORDER — INDOMETHACIN 50 MG/1
50 CAPSULE ORAL
Qty: 15 CAPSULE | Refills: 0 | Status: SHIPPED | OUTPATIENT
Start: 2024-04-19 | End: 2024-04-24

## 2024-04-19 RX ADMIN — HYDROCODONE BITARTRATE AND ACETAMINOPHEN 1 TABLET: 10; 325 TABLET ORAL at 11:04

## 2024-04-20 LAB — URATE SERPL-MCNC: 10.4 MG/DL (ref 2.4–5.7)

## 2024-04-20 NOTE — ED PROVIDER NOTES
Encounter Date: 2024       History     Chief Complaint   Patient presents with    Foot Pain     Pt denies any trauma to the foot just pain in the area     Chief complaint is 3 days of pain to the right great toe area of her right foot.  Of gout in the past.  No history of recent trauma.  No complaints otherwise.  She does have multiple medical problems including anxiety coronary artery disease heart failure, diabetes, high cholesterol, anxiety,        Review of patient's allergies indicates:  No Known Allergies  Past Medical History:   Diagnosis Date    Anxiety     CAD (coronary artery disease)     CHF (congestive heart failure)     Chronic low back pain     Diabetes 2012    Diabetes mellitus     Hyperkalemia 2022    Hypertension     Leukocytosis 2022    Metabolic acidosis with respiratory acidosis 2022     Past Surgical History:   Procedure Laterality Date    ANGIOGRAM, CORONARY, WITH LEFT HEART CATHETERIZATION N/A 2024    Procedure: Angiogram, Coronary, with Left Heart Cath;  Surgeon: Dana Henriquez MD;  Location: [facility] CATH/EP LAB;  Service: Cardiology;  Laterality: N/A;    CARDIAC DEFIBRILLATOR PLACEMENT      CORONARY ARTERY BYPASS GRAFT      HYSTERECTOMY       Family History   Problem Relation Name Age of Onset    Diabetes Mother      Mental illness Brother      Heart disease Brother          MI    Cerebral aneurysm Brother       Social History     Tobacco Use    Smoking status: Former     Current packs/day: 0.00     Types: Cigarettes     Quit date: 2018     Years since quittin.3    Smokeless tobacco: Never   Substance Use Topics    Alcohol use: No     Review of Systems   Constitutional:  Negative for chills and fever.   HENT:  Negative for ear pain, rhinorrhea and sore throat.    Eyes:  Negative for pain and visual disturbance.   Respiratory:  Negative for cough and shortness of breath.    Cardiovascular:  Negative for chest pain and palpitations.   Gastrointestinal:   Negative for abdominal pain, constipation, diarrhea, nausea and vomiting.   Genitourinary:  Negative for dysuria, frequency, hematuria and urgency.   Musculoskeletal:  Negative for back pain, joint swelling and myalgias.        Right great toe pain   Skin:  Negative for rash.   Neurological:  Negative for dizziness, seizures, weakness and headaches.   Psychiatric/Behavioral:  Negative for dysphoric mood. The patient is not nervous/anxious.        Physical Exam     Initial Vitals [04/19/24 2247]   BP Pulse Resp Temp SpO2   122/63 95 18 100 °F (37.8 °C) 95 %      MAP       --         Physical Exam    Nursing note and vitals reviewed.  Constitutional: She appears well-developed and well-nourished.   HENT:   Head: Normocephalic and atraumatic.   Eyes: Conjunctivae, EOM and lids are normal. Pupils are equal, round, and reactive to light.   Neck: Trachea normal. Neck supple. No thyroid mass and no thyromegaly present.   Normal range of motion.  Cardiovascular:  Normal rate, regular rhythm and normal heart sounds.           Pulmonary/Chest: Effort normal and breath sounds normal.   Abdominal: Abdomen is soft. Bowel sounds are normal. There is no abdominal tenderness.   Musculoskeletal:         General: Normal range of motion.      Cervical back: Normal range of motion and neck supple.     Neurological: She is alert and oriented to person, place, and time. She has normal strength and normal reflexes. No cranial nerve deficit or sensory deficit.   Skin: Skin is warm and dry.   Patient with pink discoloration swelling to the meta tarsal phalangeal joint of the large toe.  No pain to the ankle itself.  No bruising noted.  No pain to the dorsum of the foot itself.   Psychiatric: She has a normal mood and affect. Her speech is normal and behavior is normal. Judgment and thought content normal.         ED Course   Procedures  Labs Reviewed   URIC ACID - Abnormal; Notable for the following components:       Result Value    Uric  Acid 10.4 (*)     All other components within normal limits          Imaging Results    None          Medications   HYDROcodone-acetaminophen  mg per tablet 1 tablet (1 tablet Oral Given 4/19/24 3635)     Medical Decision Making  Patient has clinical findings of gout.  Treated as such.  Uric acid level drawn.  She will need to follow up with her doctor and reviewed the test.  The test did return prior to me making this final dictation and it was elevated.    Amount and/or Complexity of Data Reviewed  Labs: ordered.    Risk  Prescription drug management.                                      Clinical Impression:  Final diagnoses:  [M10.9] Gout, unspecified cause, unspecified chronicity, unspecified site (Primary)          ED Disposition Condition    Discharge Stable          ED Prescriptions       Medication Sig Dispense Start Date End Date Auth. Provider    indomethacin (INDOCIN) 50 MG capsule Take 1 capsule (50 mg total) by mouth 3 (three) times daily with meals. for 5 days 15 capsule 4/19/2024 4/24/2024 Carolina Linda MD    HYDROcodone-acetaminophen (NORCO)  mg per tablet Take 1 tablet by mouth every 6 (six) hours as needed for Pain. 12 tablet 4/19/2024 4/22/2024 Carolina Linda MD          Follow-up Information    None          Carolina Linda MD  04/20/24 0523

## 2024-04-22 ENCOUNTER — PATIENT OUTREACH (OUTPATIENT)
Dept: EMERGENCY MEDICINE | Facility: HOSPITAL | Age: 69
End: 2024-04-22

## 2024-04-23 NOTE — PROGRESS NOTES
ED Navigator attempted to contact patient on two separate occasions, patient is unable to reach. ED Navigator to close encounter at this time.

## 2024-04-24 ENCOUNTER — OFFICE VISIT (OUTPATIENT)
Dept: FAMILY MEDICINE | Facility: CLINIC | Age: 69
End: 2024-04-24
Payer: MEDICARE

## 2024-04-24 VITALS
OXYGEN SATURATION: 94 % | HEART RATE: 83 BPM | BODY MASS INDEX: 30.28 KG/M2 | DIASTOLIC BLOOD PRESSURE: 54 MMHG | SYSTOLIC BLOOD PRESSURE: 100 MMHG | RESPIRATION RATE: 16 BRPM | HEIGHT: 63 IN | WEIGHT: 170.88 LBS

## 2024-04-24 DIAGNOSIS — Z79.4 TYPE 2 DIABETES MELLITUS WITH HYPERGLYCEMIA, WITH LONG-TERM CURRENT USE OF INSULIN: Primary | ICD-10-CM

## 2024-04-24 DIAGNOSIS — E11.65 TYPE 2 DIABETES MELLITUS WITH HYPERGLYCEMIA, WITH LONG-TERM CURRENT USE OF INSULIN: Primary | ICD-10-CM

## 2024-04-24 PROCEDURE — 3074F SYST BP LT 130 MM HG: CPT | Mod: HCNC,CPTII,S$GLB,

## 2024-04-24 PROCEDURE — 99999 PR PBB SHADOW E&M-EST. PATIENT-LVL IV: CPT | Mod: PBBFAC,HCNC,,

## 2024-04-24 PROCEDURE — 1160F RVW MEDS BY RX/DR IN RCRD: CPT | Mod: HCNC,CPTII,S$GLB,

## 2024-04-24 PROCEDURE — 1126F AMNT PAIN NOTED NONE PRSNT: CPT | Mod: HCNC,CPTII,S$GLB,

## 2024-04-24 PROCEDURE — 3051F HG A1C>EQUAL 7.0%<8.0%: CPT | Mod: HCNC,CPTII,S$GLB,

## 2024-04-24 PROCEDURE — 3288F FALL RISK ASSESSMENT DOCD: CPT | Mod: HCNC,CPTII,S$GLB,

## 2024-04-24 PROCEDURE — 1159F MED LIST DOCD IN RCRD: CPT | Mod: HCNC,CPTII,S$GLB,

## 2024-04-24 PROCEDURE — 1101F PT FALLS ASSESS-DOCD LE1/YR: CPT | Mod: HCNC,CPTII,S$GLB,

## 2024-04-24 PROCEDURE — 99213 OFFICE O/P EST LOW 20 MIN: CPT | Mod: HCNC,S$GLB,,

## 2024-04-24 PROCEDURE — 4010F ACE/ARB THERAPY RXD/TAKEN: CPT | Mod: HCNC,CPTII,S$GLB,

## 2024-04-24 PROCEDURE — 3078F DIAST BP <80 MM HG: CPT | Mod: HCNC,CPTII,S$GLB,

## 2024-04-24 PROCEDURE — 3008F BODY MASS INDEX DOCD: CPT | Mod: HCNC,CPTII,S$GLB,

## 2024-04-24 NOTE — PROGRESS NOTES
Subjective:       Patient ID: Celine Steen is a 68 y.o. female.    Chief Complaint: 3 month follow-up - A1c      Cleine Steen is a 68 y.o. female with history of HTN, HLP, CHF, DM2 who presents to clinic for 3 month follow up. A1c has improved from 11.1 to 7.4. Pt is currently taking Toujeo 30 units nightly and Novolog sliding scale. Reports she just started Ozempic 0.25 mg weekly. Had appointment with endo on 3/1 and has labs scheduled in July with follow up after.         Review of Systems   Respiratory:  Negative for shortness of breath.    Cardiovascular:  Negative for chest pain.   Gastrointestinal:  Negative for abdominal pain.   Neurological:  Negative for dizziness, light-headedness and headaches.         Past Medical History:   Diagnosis Date    Anxiety     CAD (coronary artery disease)     CHF (congestive heart failure)     Chronic low back pain     Diabetes 2/1/2012    Diabetes mellitus     Hyperkalemia 2/13/2022    Hypertension     Leukocytosis 2/13/2022    Metabolic acidosis with respiratory acidosis 2/13/2022       Review of patient's allergies indicates:  No Known Allergies      Current Outpatient Medications:     aspirin (ECOTRIN) 81 MG EC tablet, Take 81 mg by mouth once daily., Disp: , Rfl:     blood sugar diagnostic (FREESTYLE LITE STRIPS) Strp, 1 each by Misc.(Non-Drug; Combo Route) route 4 (four) times daily., Disp: 200 each, Rfl: 3    blood-glucose meter kit, Use as instructed, Disp: 1 each, Rfl: 0    clopidogreL (PLAVIX) 75 mg tablet, Take 1 tablet (75 mg total) by mouth once daily., Disp: 90 tablet, Rfl: 3    coenzyme Q10 200 mg capsule, Take 200 mg by mouth 2 (two) times daily., Disp: , Rfl:     DEXCOM G7  Misc, Use to monitor glucose., Disp: 1 each, Rfl: 0    DEXCOM G7 SENSOR Katia, Change every 10 days., Disp: 3 each, Rfl: 11    EScitalopram oxalate (LEXAPRO) 20 MG tablet, TAKE 1/2 TABLET BY MOUTH TWICE DAILY (Patient taking differently: Take 10 mg by mouth 2 (two) times a day.  "TAKE 1/2 TABLET BY MOUTH TWICE DAILY), Disp: 90 tablet, Rfl: 0    furosemide (LASIX) 40 MG tablet, Take 1 tablet (40 mg total) by mouth 2 (two) times daily., Disp: 180 tablet, Rfl: 2    indomethacin (INDOCIN) 50 MG capsule, Take 1 capsule (50 mg total) by mouth 3 (three) times daily with meals. for 5 days, Disp: 15 capsule, Rfl: 0    insulin glargine, TOUJEO, (TOUJEO SOLOSTAR U-300 INSULIN) 300 unit/mL (1.5 mL) InPn pen, Inject 30 units nightly., Disp: 30 mL, Rfl: 3    lancets (LANCETS,THIN) Misc, 1 each by Misc.(Non-Drug; Combo Route) route 4 (four) times daily., Disp: 200 each, Rfl: 3    lisinopriL 10 MG tablet, Take 1 tablet (10 mg total) by mouth once daily., Disp: 90 tablet, Rfl: 3    metoprolol succinate (TOPROL-XL) 50 MG 24 hr tablet, Take 2 tablets (100 mg total) by mouth once daily., Disp: 180 tablet, Rfl: 3    mirtazapine (REMERON) 7.5 MG Tab, Take 1 tablet (7.5 mg total) by mouth every evening., Disp: 30 tablet, Rfl: 11    nitroGLYCERIN (NITROSTAT) 0.3 MG SL tablet, Place 1 tablet (0.3 mg total) under the tongue every 5 (five) minutes as needed for Chest pain., Disp: 25 tablet, Rfl: 12    omega-3 fatty acids/fish oil (FISH OIL-OMEGA-3 FATTY ACIDS) 300-1,000 mg capsule, Take 1 capsule by mouth once daily., Disp: , Rfl:     ondansetron (ZOFRAN-ODT) 4 MG TbDL, Take 1 tablet (4 mg total) by mouth every 8 (eight) hours as needed (nauseau)., Disp: 30 tablet, Rfl: 0    pen needle, diabetic (DROPLET PEN NEEDLE) 32 gauge x 5/32" Ndle, 1 each by Misc.(Non-Drug; Combo Route) route 3 (three) times daily., Disp: 90 each, Rfl: 3    potassium citrate 99 mg Cap, Take 1 capsule by mouth once daily., Disp: , Rfl:     ranolazine (RANEXA) 500 MG Tb12, Take 1 tablet (500 mg total) by mouth 2 (two) times daily., Disp: 180 tablet, Rfl: 3    semaglutide (OZEMPIC) 0.25 mg or 0.5 mg (2 mg/3 mL) pen injector, Inject 0.25 mg into the skin every 7 days for 30 days, THEN 0.5 mg every 7 days., Disp: 3 mL, Rfl: 11    azelastine " "(ASTELIN) 137 mcg (0.1 %) nasal spray, 1 spray (137 mcg total) by Nasal route 2 (two) times daily. for 7 days, Disp: 30 mL, Rfl: 0    simvastatin (ZOCOR) 40 MG tablet, Take 1 tablet (40 mg total) by mouth every evening., Disp: 90 tablet, Rfl: 3    Objective:        Physical Exam  Vitals reviewed.   Constitutional:       Appearance: Normal appearance.   HENT:      Head: Normocephalic and atraumatic.   Eyes:      Conjunctiva/sclera: Conjunctivae normal.   Cardiovascular:      Rate and Rhythm: Normal rate and regular rhythm.      Heart sounds: Normal heart sounds.   Pulmonary:      Effort: Pulmonary effort is normal.      Breath sounds: Normal breath sounds.   Musculoskeletal:         General: Normal range of motion.      Cervical back: Normal range of motion and neck supple.   Skin:     General: Skin is warm and dry.   Neurological:      Mental Status: She is alert and oriented to person, place, and time.           Visit Vitals  BP (!) 100/54 (BP Location: Right arm, Patient Position: Sitting, BP Method: Small (Manual))   Pulse 83   Resp 16   Ht 5' 3" (1.6 m)   Wt 77.5 kg (170 lb 13.7 oz)   SpO2 (!) 94%   BMI 30.27 kg/m²      Assessment:         1. Type 2 diabetes mellitus with hyperglycemia, with long-term current use of insulin        Plan:         Celine was seen today for follow-up.    Diagnoses and all orders for this visit:    Type 2 diabetes mellitus with hyperglycemia, with long-term current use of insulin    -  Better controlled currently. Continue current medications and follow up as directed with endo. Encouraged healthy diet.        Follow up as scheduled with PCP or sooner if needed.        Family Medicine Physician Assistant     Future Appointments       Date Provider Specialty Appt Notes    5/15/2024 Dana Henriquez MD Cardiology lvm about appt on spouse phone cant leave vm on her phone 2 month    7/3/2024  Lab lab    7/3/2024  Radiology dexa    7/8/2024 MAGALI Pang PA-C Endocrinology 3 month f/u "    8/6/2024 Natan Marie MD Family Medicine 6 mo f/u             Tests to Keep You Healthy    Mammogram: ORDERED BUT NOT SCHEDULED  Eye Exam: ORDERED BUT NOT SCHEDULED  Colon Cancer Screening: DUE  Last Blood Pressure <= 139/89 (4/24/2024): Yes  Last HbA1c < 8 (04/17/2024): Yes       I spent a total of 15 minutes on the day of the visit.This includes face to face time and non-face to face time preparing to see the patient (eg, review of tests), obtaining and/or reviewing separately obtained history, documenting clinical information in the electronic or other health record, independently interpreting results and communicating results to the patient/family/caregiver, or care coordinator.    We have addressed [3] Low: 2 or more self-limited or minor problems / 1 stable chronic illness / 1 acute, uncomplicated illness or injury  The complexity of the data reviewed and analyzed for this visit was [3] Limited (Reviewed prior external note, ordered unique testing or reviewed the results of each unique test)   The risk of complications and/or morbidity or mortality are [3] Low risk   The level of Medical Decision Making for this visit is [3] Low

## 2024-05-03 ENCOUNTER — PATIENT OUTREACH (OUTPATIENT)
Dept: ADMINISTRATIVE | Facility: HOSPITAL | Age: 69
End: 2024-05-03
Payer: MEDICARE

## 2024-05-03 NOTE — LETTER
May 3, 2024    Celine Steen  [address] [place] [id number]             [facility]  [address]  [facility] [address]  Phone: [phone] Dear Nancy, [facility] is committed to your overall health and would like to ensure that you are up to date on your recommended test and/or procedures.   Natan Marie MD has found that your chart shows you may be due for the following:      Health Maintenance Due  Topic   Eye Exam    Mammogram    DEXA Scan    Colorectal Cancer Screening    Diabetes Urine Screening       If you have had any of the above done at another facility, please let us know so that we may obtain copies from that facility.  If you have a copy of these records, please provide a copy so that we may update your records.  Also, You are welcome to request that the report be faxed to us at   [phone].    If you have an upcoming scheduled appointment for the above test and/or procedures, please disregard this letter.  Otherwise, please contact us through your [facility] portal or by calling [phone] and we will assist in scheduling these appointments for you.      Thank you for letting us care for you,    Sincerely,    Your [facility] Primary [facility] Team    Phone#  [phone]  FAX#  [phone]

## 2024-05-03 NOTE — PROGRESS NOTES
Population Health Chart Review & Patient Outreach Details      Additional [facility] Health Notes:               Updates Requested / Reviewed:      Updated Care Coordination Note, Care Everywhere, and          Health Maintenance Topics Overdue:      VBHM Score: 3     Colon Cancer Screening  Eye Exam  Mammogram    Tetanus Vaccine  Shingles/Zoster Vaccine  RSV Vaccine                  Health Maintenance Topic(s) Outreach Outcomes & Actions Taken:    Breast Cancer Screening - Outreach Outcomes & Actions Taken  : letter    Osteoporosis Screening - Outreach Outcomes & Actions Taken  : Dexa Appointment Scheduled    Colorectal Cancer Screening - Outreach Outcomes & Actions Taken  : l    Eye Exam - Outreach Outcomes & Actions Taken  : l

## 2024-05-09 ENCOUNTER — PATIENT OUTREACH (OUTPATIENT)
Dept: ADMINISTRATIVE | Facility: HOSPITAL | Age: 69
End: 2024-05-09
Payer: MEDICARE

## 2024-05-09 DIAGNOSIS — E11.69 HYPERLIPIDEMIA ASSOCIATED WITH TYPE 2 DIABETES MELLITUS: ICD-10-CM

## 2024-05-09 DIAGNOSIS — E78.5 HYPERLIPIDEMIA ASSOCIATED WITH TYPE 2 DIABETES MELLITUS: ICD-10-CM

## 2024-05-09 NOTE — PROGRESS NOTES
Population Health Chart Review & Patient Outreach Details      Additional [facility] Health Notes:      The patient is showing as non-compliant on the Statin Therapy Measure       -  Statin Rx .  NEED NEW STATIN RX  REFILL MSG SENT TO PCP to review                  Updates Requested / Reviewed:        Health Maintenance Topics Overdue:      VBHM Score: 3     Colon Cancer Screening  Eye Exam  Mammogram    Tetanus Vaccine  Shingles/Zoster Vaccine  RSV Vaccine                  Health Maintenance Topic(s) Outreach Outcomes & Actions Taken:    Medication Adherence / Statins - Outreach Outcomes & Actions Taken  : Primary Care Appt Scheduled and Sent Provider Message to Review to Evaluate Pt for Statin, Add Exclusion Dx Codes, Document Exclusion in Problem List, Change Statin Intensity Level to Moderate or High Intensity if Applicable

## 2024-05-10 RX ORDER — SIMVASTATIN 40 MG/1
40 TABLET, FILM COATED ORAL NIGHTLY
Qty: 90 TABLET | Refills: 3 | Status: SHIPPED | OUTPATIENT
Start: 2024-05-10 | End: 2024-08-08

## 2024-05-15 ENCOUNTER — OFFICE VISIT (OUTPATIENT)
Dept: CARDIOLOGY | Facility: CLINIC | Age: 69
End: 2024-05-15
Payer: MEDICARE

## 2024-05-15 VITALS
HEIGHT: 63 IN | RESPIRATION RATE: 16 BRPM | HEART RATE: 84 BPM | OXYGEN SATURATION: 98 % | WEIGHT: 167 LBS | BODY MASS INDEX: 29.59 KG/M2 | SYSTOLIC BLOOD PRESSURE: 118 MMHG | DIASTOLIC BLOOD PRESSURE: 72 MMHG

## 2024-05-15 DIAGNOSIS — I10 PRIMARY HYPERTENSION: ICD-10-CM

## 2024-05-15 DIAGNOSIS — Z95.810 ICD (IMPLANTABLE CARDIOVERTER-DEFIBRILLATOR) IN PLACE: ICD-10-CM

## 2024-05-15 DIAGNOSIS — I50.20 HEART FAILURE WITH REDUCED EJECTION FRACTION: Primary | ICD-10-CM

## 2024-05-15 DIAGNOSIS — I25.10 CORONARY ARTERY DISEASE INVOLVING NATIVE CORONARY ARTERY OF NATIVE HEART WITHOUT ANGINA PECTORIS: ICD-10-CM

## 2024-05-15 PROCEDURE — 99214 OFFICE O/P EST MOD 30 MIN: CPT | Mod: HCNC,S$GLB,, | Performed by: STUDENT IN AN ORGANIZED HEALTH CARE EDUCATION/TRAINING PROGRAM

## 2024-05-15 PROCEDURE — 3078F DIAST BP <80 MM HG: CPT | Mod: HCNC,CPTII,S$GLB, | Performed by: STUDENT IN AN ORGANIZED HEALTH CARE EDUCATION/TRAINING PROGRAM

## 2024-05-15 PROCEDURE — 3074F SYST BP LT 130 MM HG: CPT | Mod: HCNC,CPTII,S$GLB, | Performed by: STUDENT IN AN ORGANIZED HEALTH CARE EDUCATION/TRAINING PROGRAM

## 2024-05-15 PROCEDURE — 3008F BODY MASS INDEX DOCD: CPT | Mod: HCNC,CPTII,S$GLB, | Performed by: STUDENT IN AN ORGANIZED HEALTH CARE EDUCATION/TRAINING PROGRAM

## 2024-05-15 PROCEDURE — 3066F NEPHROPATHY DOC TX: CPT | Mod: HCNC,CPTII,S$GLB, | Performed by: STUDENT IN AN ORGANIZED HEALTH CARE EDUCATION/TRAINING PROGRAM

## 2024-05-15 PROCEDURE — 1160F RVW MEDS BY RX/DR IN RCRD: CPT | Mod: HCNC,CPTII,S$GLB, | Performed by: STUDENT IN AN ORGANIZED HEALTH CARE EDUCATION/TRAINING PROGRAM

## 2024-05-15 PROCEDURE — 3061F NEG MICROALBUMINURIA REV: CPT | Mod: HCNC,CPTII,S$GLB, | Performed by: STUDENT IN AN ORGANIZED HEALTH CARE EDUCATION/TRAINING PROGRAM

## 2024-05-15 PROCEDURE — 3044F HG A1C LEVEL LT 7.0%: CPT | Mod: HCNC,CPTII,S$GLB, | Performed by: STUDENT IN AN ORGANIZED HEALTH CARE EDUCATION/TRAINING PROGRAM

## 2024-05-15 PROCEDURE — 4010F ACE/ARB THERAPY RXD/TAKEN: CPT | Mod: HCNC,CPTII,S$GLB, | Performed by: STUDENT IN AN ORGANIZED HEALTH CARE EDUCATION/TRAINING PROGRAM

## 2024-05-15 PROCEDURE — 99999 PR PBB SHADOW E&M-EST. PATIENT-LVL V: CPT | Mod: PBBFAC,HCNC,, | Performed by: STUDENT IN AN ORGANIZED HEALTH CARE EDUCATION/TRAINING PROGRAM

## 2024-05-15 PROCEDURE — 1126F AMNT PAIN NOTED NONE PRSNT: CPT | Mod: HCNC,CPTII,S$GLB, | Performed by: STUDENT IN AN ORGANIZED HEALTH CARE EDUCATION/TRAINING PROGRAM

## 2024-05-15 PROCEDURE — 1159F MED LIST DOCD IN RCRD: CPT | Mod: HCNC,CPTII,S$GLB, | Performed by: STUDENT IN AN ORGANIZED HEALTH CARE EDUCATION/TRAINING PROGRAM

## 2024-05-15 PROCEDURE — 3288F FALL RISK ASSESSMENT DOCD: CPT | Mod: HCNC,CPTII,S$GLB, | Performed by: STUDENT IN AN ORGANIZED HEALTH CARE EDUCATION/TRAINING PROGRAM

## 2024-05-15 PROCEDURE — 1101F PT FALLS ASSESS-DOCD LE1/YR: CPT | Mod: HCNC,CPTII,S$GLB, | Performed by: STUDENT IN AN ORGANIZED HEALTH CARE EDUCATION/TRAINING PROGRAM

## 2024-05-15 RX ORDER — ONDANSETRON 4 MG/1
4 TABLET, ORALLY DISINTEGRATING ORAL EVERY 6 HOURS PRN
Qty: 1 TABLET | Refills: 0 | Status: SHIPPED | OUTPATIENT
Start: 2024-05-15

## 2024-05-15 RX ORDER — SACUBITRIL AND VALSARTAN 24; 26 MG/1; MG/1
1 TABLET, FILM COATED ORAL 2 TIMES DAILY
Qty: 180 TABLET | Refills: 1 | Status: SHIPPED | OUTPATIENT
Start: 2024-05-15

## 2024-05-15 NOTE — PATIENT INSTRUCTIONS
Wait two days after stopping lisinopril to start taking Entresto.  Bloodwork (BMP) 3-5 days after starting Entresto.

## 2024-05-16 ENCOUNTER — HOSPITAL ENCOUNTER (OUTPATIENT)
Dept: CARDIOLOGY | Facility: CLINIC | Age: 69
Discharge: HOME | End: 2024-05-16
Attending: STUDENT IN AN ORGANIZED HEALTH CARE EDUCATION/TRAINING PROGRAM
Payer: MEDICARE

## 2024-05-16 DIAGNOSIS — Z95.810 ICD (IMPLANTABLE CARDIOVERTER-DEFIBRILLATOR) IN PLACE: ICD-10-CM

## 2024-05-16 PROCEDURE — 93288 INTERROG EVL PM/LDLS PM IP: CPT | Mod: HCNC,,, | Performed by: STUDENT IN AN ORGANIZED HEALTH CARE EDUCATION/TRAINING PROGRAM

## 2024-05-17 DIAGNOSIS — F51.01 PRIMARY INSOMNIA: ICD-10-CM

## 2024-05-17 DIAGNOSIS — Z95.0 CARDIAC PACEMAKER IN SITU: Primary | ICD-10-CM

## 2024-05-17 DIAGNOSIS — R94.31 ABNORMAL ELECTROCARDIOGRAM (ECG) (EKG): ICD-10-CM

## 2024-05-17 DIAGNOSIS — R79.1 ABNORMAL COAGULATION PROFILE: ICD-10-CM

## 2024-05-17 DIAGNOSIS — Z45.010 PACEMAKER BATTERY DEPLETION: ICD-10-CM

## 2024-05-17 RX ORDER — MUPIROCIN 20 MG/G
1 OINTMENT TOPICAL
Status: CANCELLED | OUTPATIENT
Start: 2024-05-17

## 2024-05-17 RX ORDER — CEFAZOLIN SODIUM 2 G/50ML
2 SOLUTION INTRAVENOUS
Status: CANCELLED | OUTPATIENT
Start: 2024-05-17

## 2024-05-17 RX ORDER — MIRTAZAPINE 7.5 MG/1
7.5 TABLET, FILM COATED ORAL NIGHTLY
Qty: 30 TABLET | Refills: 11 | Status: SHIPPED | OUTPATIENT
Start: 2024-05-17

## 2024-05-17 RX ORDER — SODIUM CHLORIDE 9 MG/ML
INJECTION, SOLUTION INTRAVENOUS CONTINUOUS
Status: CANCELLED | OUTPATIENT
Start: 2024-05-17

## 2024-05-17 NOTE — TELEPHONE ENCOUNTER
No care due was identified.  [facility] Embedded Care Due Messages. Reference number: [id number].   5/17/2024 3:48:55 AM CDT

## 2024-05-24 DIAGNOSIS — E11.65 TYPE 2 DIABETES MELLITUS WITH HYPERGLYCEMIA, WITH LONG-TERM CURRENT USE OF INSULIN: ICD-10-CM

## 2024-05-24 DIAGNOSIS — Z79.4 TYPE 2 DIABETES MELLITUS WITH HYPERGLYCEMIA, WITH LONG-TERM CURRENT USE OF INSULIN: ICD-10-CM

## 2024-05-24 RX ORDER — BLOOD SUGAR DIAGNOSTIC
STRIP MISCELLANEOUS
Qty: 200 STRIP | Refills: 11 | Status: SHIPPED | OUTPATIENT
Start: 2024-05-24

## 2024-06-05 ENCOUNTER — HOSPITAL ENCOUNTER (OUTPATIENT)
Facility: HOSPITAL | Age: 69
Discharge: HOME | End: 2024-06-05
Attending: INTERNAL MEDICINE | Admitting: INTERNAL MEDICINE
Payer: MEDICARE

## 2024-06-05 DIAGNOSIS — Z95.0 CARDIAC PACEMAKER IN SITU: ICD-10-CM

## 2024-06-05 DIAGNOSIS — R94.31 ABNORMAL ELECTROCARDIOGRAM (ECG) (EKG): ICD-10-CM

## 2024-06-05 DIAGNOSIS — I25.10 CORONARY ARTERY DISEASE INVOLVING NATIVE CORONARY ARTERY OF NATIVE HEART WITHOUT ANGINA PECTORIS: Chronic | ICD-10-CM

## 2024-06-05 DIAGNOSIS — E11.65 TYPE 2 DIABETES MELLITUS WITH HYPERGLYCEMIA, WITH LONG-TERM CURRENT USE OF INSULIN: ICD-10-CM

## 2024-06-05 DIAGNOSIS — I49.9 ARRHYTHMIA: ICD-10-CM

## 2024-06-05 DIAGNOSIS — Z45.010 PACEMAKER BATTERY DEPLETION: ICD-10-CM

## 2024-06-05 DIAGNOSIS — Z79.4 TYPE 2 DIABETES MELLITUS WITH HYPERGLYCEMIA, WITH LONG-TERM CURRENT USE OF INSULIN: ICD-10-CM

## 2024-06-05 DIAGNOSIS — Z45.02 ICD (IMPLANTABLE CARDIOVERTER-DEFIBRILLATOR) BATTERY DEPLETION: Primary | ICD-10-CM

## 2024-06-05 DIAGNOSIS — R79.1 ABNORMAL COAGULATION PROFILE: ICD-10-CM

## 2024-06-05 LAB
ANION GAP SERPL CALC-SCNC: 6 MMOL/L (ref 8–16)
APTT PPP: 27.4 SEC (ref 21–32)
BASOPHILS # BLD AUTO: 0.06 K/UL (ref 0–0.2)
BASOPHILS NFR BLD: 0.4 % (ref 0–1.9)
BUN SERPL-MCNC: 35 MG/DL (ref 8–23)
CALCIUM SERPL-MCNC: 9.4 MG/DL (ref 8.7–10.5)
CHLORIDE SERPL-SCNC: 101 MMOL/L (ref 95–110)
CO2 SERPL-SCNC: 31 MMOL/L (ref 23–29)
CREAT SERPL-MCNC: 1.2 MG/DL (ref 0.5–1.4)
DIFFERENTIAL METHOD BLD: ABNORMAL
EOSINOPHIL # BLD AUTO: 0.4 K/UL (ref 0–0.5)
EOSINOPHIL NFR BLD: 2.4 % (ref 0–8)
ERYTHROCYTE [DISTWIDTH] IN BLOOD BY AUTOMATED COUNT: 14.5 % (ref 11.5–14.5)
EST. GFR  (NO RACE VARIABLE): 48.4 ML/MIN/1.73 M^2
GLUCOSE SERPL-MCNC: 175 MG/DL (ref 70–110)
HCT VFR BLD AUTO: 38.8 % (ref 37–48.5)
HGB BLD-MCNC: 12.1 G/DL (ref 12–16)
IMM GRANULOCYTES # BLD AUTO: 0.08 K/UL (ref 0–0.04)
IMM GRANULOCYTES NFR BLD AUTO: 0.5 % (ref 0–0.5)
INR PPP: 0.9 (ref 0.8–1.2)
LYMPHOCYTES # BLD AUTO: 2 K/UL (ref 1–4.8)
LYMPHOCYTES NFR BLD: 13.9 % (ref 18–48)
MCH RBC QN AUTO: 27.4 PG (ref 27–31)
MCHC RBC AUTO-ENTMCNC: 31.2 G/DL (ref 32–36)
MCV RBC AUTO: 88 FL (ref 82–98)
MONOCYTES # BLD AUTO: 1.5 K/UL (ref 0.3–1)
MONOCYTES NFR BLD: 10.3 % (ref 4–15)
MRSA SCREEN BY PCR: NEGATIVE
NEUTROPHILS # BLD AUTO: 10.6 K/UL (ref 1.8–7.7)
NEUTROPHILS NFR BLD: 72.5 % (ref 38–73)
NRBC BLD-RTO: 0 /100 WBC
PLATELET # BLD AUTO: 386 K/UL (ref 150–450)
PMV BLD AUTO: 9.6 FL (ref 9.2–12.9)
POTASSIUM SERPL-SCNC: 4.4 MMOL/L (ref 3.5–5.1)
PROTHROMBIN TIME: 10.4 SEC (ref 9–12.5)
RBC # BLD AUTO: 4.42 M/UL (ref 4–5.4)
SODIUM SERPL-SCNC: 138 MMOL/L (ref 136–145)
WBC # BLD AUTO: 14.56 K/UL (ref 3.9–12.7)

## 2024-06-05 PROCEDURE — 33262 RMVL& REPLC PULSE GEN 1 LEAD: CPT | Performed by: INTERNAL MEDICINE

## 2024-06-05 PROCEDURE — 93005 ELECTROCARDIOGRAM TRACING: CPT | Performed by: INTERNAL MEDICINE

## 2024-06-05 PROCEDURE — 33263 RMVL & RPLCMT DFB GEN 2 LEAD: CPT | Performed by: INTERNAL MEDICINE

## 2024-06-05 PROCEDURE — 93010 ELECTROCARDIOGRAM REPORT: CPT | Mod: ,,, | Performed by: INTERNAL MEDICINE

## 2024-06-05 PROCEDURE — 27201423 OPTIME MED/SURG SUP & DEVICES STERILE SUPPLY: Performed by: INTERNAL MEDICINE

## 2024-06-05 PROCEDURE — 25000003 PHARM REV CODE 250: Performed by: INTERNAL MEDICINE

## 2024-06-05 PROCEDURE — 87641 MR-STAPH DNA AMP PROBE: CPT | Performed by: INTERNAL MEDICINE

## 2024-06-05 PROCEDURE — 33262 RMVL& REPLC PULSE GEN 1 LEAD: CPT | Mod: ,,, | Performed by: INTERNAL MEDICINE

## 2024-06-05 PROCEDURE — 85610 PROTHROMBIN TIME: CPT | Performed by: NURSE PRACTITIONER

## 2024-06-05 PROCEDURE — 25000003 PHARM REV CODE 250: Performed by: NURSE PRACTITIONER

## 2024-06-05 PROCEDURE — 63600175 PHARM REV CODE 636 W HCPCS: Performed by: INTERNAL MEDICINE

## 2024-06-05 PROCEDURE — 80048 BASIC METABOLIC PNL TOTAL CA: CPT | Performed by: NURSE PRACTITIONER

## 2024-06-05 PROCEDURE — 85025 COMPLETE CBC W/AUTO DIFF WBC: CPT | Performed by: NURSE PRACTITIONER

## 2024-06-05 PROCEDURE — 85730 THROMBOPLASTIN TIME PARTIAL: CPT | Performed by: NURSE PRACTITIONER

## 2024-06-05 PROCEDURE — 27800903 OPTIME MED/SURG SUP & DEVICES OTHER IMPLANTS: Performed by: INTERNAL MEDICINE

## 2024-06-05 PROCEDURE — C1722 AICD, SINGLE CHAMBER: HCPCS | Performed by: INTERNAL MEDICINE

## 2024-06-05 PROCEDURE — 33263 RMVL & RPLCMT DFB GEN 2 LEAD: CPT | Mod: ,,, | Performed by: INTERNAL MEDICINE

## 2024-06-05 DEVICE — ENVELOPE CMRM6122 ABSORB MED MR
Type: IMPLANTABLE DEVICE | Site: CHEST | Status: FUNCTIONAL
Brand: TYRX™

## 2024-06-05 DEVICE — TIERED-THERAPY CARDIOVERTER/DEFIBRILLATOR VVEV VVIR
Type: IMPLANTABLE DEVICE | Site: CHEST | Status: FUNCTIONAL
Brand: FORTIFY ASSURA™

## 2024-06-05 RX ORDER — CLOPIDOGREL BISULFATE 75 MG/1
75 TABLET ORAL DAILY
Qty: 30 TABLET | Refills: 11 | Status: SHIPPED | OUTPATIENT
Start: 2024-06-08 | End: 2025-06-08

## 2024-06-05 RX ORDER — CEFAZOLIN SODIUM 2 G/50ML
2 SOLUTION INTRAVENOUS
Status: DISCONTINUED | OUTPATIENT
Start: 2024-06-05 | End: 2024-06-05 | Stop reason: HOSPADM

## 2024-06-05 RX ORDER — ACETAMINOPHEN 325 MG/1
650 TABLET ORAL EVERY 4 HOURS PRN
Status: DISCONTINUED | OUTPATIENT
Start: 2024-06-05 | End: 2024-06-05 | Stop reason: HOSPADM

## 2024-06-05 RX ORDER — LIDOCAINE HYDROCHLORIDE 10 MG/ML
INJECTION, SOLUTION EPIDURAL; INFILTRATION; INTRACAUDAL; PERINEURAL
Status: DISCONTINUED | OUTPATIENT
Start: 2024-06-05 | End: 2024-06-05 | Stop reason: HOSPADM

## 2024-06-05 RX ORDER — SODIUM CHLORIDE 9 MG/ML
INJECTION, SOLUTION INTRAVENOUS CONTINUOUS
Status: DISCONTINUED | OUTPATIENT
Start: 2024-06-05 | End: 2024-06-05 | Stop reason: HOSPADM

## 2024-06-05 RX ORDER — VANCOMYCIN HYDROCHLORIDE 500 MG/10ML
INJECTION, POWDER, LYOPHILIZED, FOR SOLUTION INTRAVENOUS
Status: DISCONTINUED | OUTPATIENT
Start: 2024-06-05 | End: 2024-06-05 | Stop reason: HOSPADM

## 2024-06-05 RX ORDER — FENTANYL CITRATE 50 UG/ML
INJECTION, SOLUTION INTRAMUSCULAR; INTRAVENOUS
Status: DISCONTINUED | OUTPATIENT
Start: 2024-06-05 | End: 2024-06-05 | Stop reason: HOSPADM

## 2024-06-05 RX ORDER — MUPIROCIN 20 MG/G
1 OINTMENT TOPICAL
Status: DISCONTINUED | OUTPATIENT
Start: 2024-06-05 | End: 2024-06-05 | Stop reason: HOSPADM

## 2024-06-05 RX ORDER — MIDAZOLAM HYDROCHLORIDE 1 MG/ML
INJECTION INTRAMUSCULAR; INTRAVENOUS
Status: DISCONTINUED | OUTPATIENT
Start: 2024-06-05 | End: 2024-06-05 | Stop reason: HOSPADM

## 2024-06-05 RX ADMIN — SODIUM CHLORIDE: 9 INJECTION, SOLUTION INTRAVENOUS at 06:06

## 2024-06-05 NOTE — DISCHARGE INSTRUCTIONS
Discharge Instructions:  DO NOT remove the flexan dressing that is covering the pacemaker incision until instructed by  your physician in your follow up appointment (usually around 10 days).  Inspect the site daily for tenderness, discharge, or signs of infection.  Keep dressing dry and intact  Do not apply powders or lotions to incision site until healed  Avoid stress to the incision/suture site. Avoid any kind of trauma to the pacemaker site.  Check your pulse daily and notify your physician if the rate is less than the pacemaker set rate.   Always carry your ID card with you. A permanent card will be mailed to you in 6-8 weeks.   Avoid areas of high voltages such as power plants, radio transmitters, or welding equipment. You may walk through anti-theft doorways, but do not stand near them for any length of time.     Activity:  Limit your activity for the next 48 hours. Avoid excessive bending or squatting.   You may use your arms but avoid reaching overhead for 6 weeks with the arm on the same side  Do not lift anything greater than 5 pounds for one month with the arm on the same side  No driving until instructed by you physician at your follow up appointment.     CALL YOUR DOCTOR IMMEDIATELY IF YOU EXPERIENCE ANY OF THE FOLLOWING:  Chest pain, palpitations, shortness of breath, excessive dizziness, fainting, nausea or vomiting.   Signs of infection including: swelling, discharge, redness, warmth , pain, fever

## 2024-06-05 NOTE — Clinical Note
The lead thresholds were tested.      The chronic RV lead was connected to a replacement chronic generator.

## 2024-06-06 VITALS
WEIGHT: 167 LBS | BODY MASS INDEX: 29.58 KG/M2 | OXYGEN SATURATION: 95 % | HEART RATE: 79 BPM | SYSTOLIC BLOOD PRESSURE: 127 MMHG | DIASTOLIC BLOOD PRESSURE: 60 MMHG | RESPIRATION RATE: 18 BRPM

## 2024-06-06 LAB
OHS QRS DURATION: 70 MS
OHS QTC CALCULATION: 442 MS

## 2024-06-20 ENCOUNTER — TELEPHONE (OUTPATIENT)
Dept: CARDIOLOGY | Facility: CLINIC | Age: 69
End: 2024-06-20
Payer: MEDICARE

## 2024-06-20 DIAGNOSIS — I25.10 CORONARY ARTERY DISEASE INVOLVING NATIVE CORONARY ARTERY OF NATIVE HEART WITHOUT ANGINA PECTORIS: Primary | Chronic | ICD-10-CM

## 2024-06-20 DIAGNOSIS — E11.9 TYPE 2 DIABETES MELLITUS WITHOUT COMPLICATION: ICD-10-CM

## 2024-06-20 NOTE — TELEPHONE ENCOUNTER
----- Message from Monika Mojica RN sent at 2024 10:40 AM CDT -----  Regardin/16 EKG Order  The EKG performed on 24 is missing an order.  Please place an order so that the EKG can be read in [place].    Thank you,  Monika Mojica RN  [facility]   [facility] Stress [facility]  3rd Floor Cardiology Clinic  [phone]/ [id number]

## 2024-06-28 LAB
OHS QRS DURATION: 80 MS
OHS QTC CALCULATION: 436 MS

## 2024-07-03 ENCOUNTER — HOSPITAL ENCOUNTER (OUTPATIENT)
Dept: RADIOLOGY | Facility: CLINIC | Age: 69
Discharge: HOME | End: 2024-07-03
Attending: PHYSICIAN ASSISTANT
Payer: MEDICARE

## 2024-07-03 DIAGNOSIS — Z78.0 POSTMENOPAUSAL: ICD-10-CM

## 2024-07-03 DIAGNOSIS — M85.80 OSTEOPENIA, UNSPECIFIED LOCATION: ICD-10-CM

## 2024-07-03 PROCEDURE — 77080 DXA BONE DENSITY AXIAL: CPT | Mod: TC,HCNC,PO

## 2024-07-03 PROCEDURE — 77080 DXA BONE DENSITY AXIAL: CPT | Mod: 26,HCNC,, | Performed by: RADIOLOGY

## 2024-07-09 NOTE — PROGRESS NOTES
Patient ID:  Celine Steen  69 y.o.  female      Assessment:       1. Heart failure with reduced ejection fraction    2. Coronary artery disease involving native coronary artery of native heart without angina pectoris    3. ICD (implantable cardioverter-defibrillator) in place    4. Primary hypertension      Angiogram on 1/12/2024 showed the 2 vein grafts were occluded and LIMA to LAD was atretic.  of proximal RCA with collaterals. 60% proximal LAD disease. 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 Distal LCx also has 70-80% disease. The patient decided to pursue medical management.   LVEF 35% (long term history of HFrEF)    Plan:     Currently stable. Asymptomatic. Appears euvolemic.     [facility] ICD interrogation is showing that the battery is end of life. Discussed generator change in detail with the patient. Discussed the risks, benefits and alternatives. She is agreeable. She will be scheduled for the procedure with Dr. Vazquez on 6/5/2024    Continue aspirin 81 mg daily and Plavix 75 mg daily - DAPT for 12 months for NSTEMI (Jan, 2024) unless any bleeding/anemia issues. Hold Plavix 5 days prior to the generator change.    Continue Ranexa 500 mg BID.     Continue Simvastatin 40 mg daily. Last LDL 75 in Jan, 2024. She has had lower LDL in the past. She attributes it to her diet and wants to make some changes in that before medication change.  Continue Toprol 100 mg daily. Change Lisinopril to Entresto 24-26 mg BID. Instructed her to stop Lisinopril and wait >36 hours before starting Entresto. Optimize further for HFrEF as tolerated in the future.    Continue Lasix 40 mg BID and potassium supplement. Maintaining euvolemia with this dose.    Counseled on daily fluid and sodium restrictions; <1.5L and 2g respectively.    Counseled on heart healthy lifestyle and ASCVD risk factor control.        Subjective:     Chief Complaint   Patient presents with    Follow-up       HPI:  Celine Steen is a 69 y.o. female with  history of CAD s/p CABG (2007 or 2008), HFrEF (LVEF 35%) - has ICD, HTN, DM II and PVD. She was in the hospital in Jan, 2024 with SOB and vomiting. Had moderate troponin elevation. Underwent coronary and bypass angiography on 1/12/2024 which showed the 2 vein grafts were occluded and LIMA to LAD was atretic.  of proximal RCA with collaterals. 60% proximal LAD disease. 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 Distal LCx also has disease. The patient decided to pursue medical management.     She reports doing well. Reports no chest pain, dyspnea, orthopnea, PND, swelling of extremities, palpitations, syncope or near syncope. Able to perform ADLs without any symptoms. ICD interrogation is showing that the battery is end of life.      PREVIOUS CARDIAC TESTING/PROCEDURES HISTORY:  Most Recent Echocardiogram Results  Results for orders placed during the hospital encounter of 01/12/24    Echo    Interpretation Summary    Left Ventricle: The left ventricle is normal in size. Normal wall thickness. Anterior apical hypokinesis See diagram for wall motion findings. There is mildly reduced systolic function. Ejection fraction by visual approximation is 35%. Grade II diastolic dysfunction.    Right Ventricle: Normal right ventricular cavity size. Wall thickness is normal. Right ventricle wall motion  is normal. Systolic function is normal.    Left Atrium: Left atrium is mildly dilated.    Right Atrium: Right atrium is mildly dilated.    Tricuspid Valve: There is mild to moderate regurgitation.    IVC/SVC: Normal venous pressure at 3 mmHg.    The estimated pulmonary artery systolic pressure is 33 mmHg.    Definity contrast was used with no apical thrombus      Most Recent Stress Test Results  Results for orders placed during the hospital encounter of 05/23/22    Nuclear Stress - Cardiology Interpreted    Interpretation Summary    Abnormal myocardial perfusion scan.    There is a moderate intensity, fixed perfusion abnormality  consistent with scar in the anteroapical wall(s).    The gated perfusion images showed an ejection fraction of 47% post stress. Normal ejection fraction is greater than 53%.    LV cavity size is mildly enlarged at rest and mildly enlarged at stress.    The EKG portion of this study is negative for ischemia.    The patient reported no chest pain during the stress test.    There were no arrhythmias during stress.    Wall motion abnormalities involving anterior apex      Most Recent Cardiac PET Stress Test Results  No results found for this or any previous visit.      Most Recent Cardiovascular Angiogram results  Results for orders placed during the hospital encounter of 01/12/24    Cardiac catheterization    Conclusion  Left main coronary artery:  Large caliber.  30% ostial stenosis.    Left anterior descending artery:  Large caliber.  Prior stent in proximal to mid LAD is patent with mild diffuse ISR and 60-70% focal stenosis at the proximal stent edge.  MARILY 3 flow noted in the vessel.  No competitive flow from LIMA is noted.    Left circumflex artery:  Large caliber.  Gives rise to a small OM1, large caliber OM2, medium caliber OM3 and small to medium caliber LPL branches.  There is 60% focal stenosis in proximal LCX with 3 focal lesions associated with post stenotic dilatation.  Distal LCX has 70-80% tubular stenosis.  OM2 has 30% ostial stenosis.  Patent stent in OM2 is noted.  The stent is after the graft anastomosis site.  Retrograde flow at the anastomosis site into the graft from the native vessel is noted.    Right coronary artery:  Dominant.  Calcified.  Chronic total occlusion in the proximal and mid segment.  Distal RCA and RPDA fills via left to right collaterals.  RPDA is diffusely diseased.  RPL has  proximally and distally fills via left-to-right collaterals.    GRAFTS:  LIMA to LAD:  Atretic with no flow in its distal segment.  Graft to RCA:  100% occluded in its proximal segment.  Graft to OM:   Likely flush occluded.  Unable to engage selectively.  Unable to see on aortogram.        The procedure log was documented by Documenter: Bita Terry RN and verified by Dana Henriquez MD.    Date: 1/30/2024  Time: 12:49 AM      Other Most Recent Cardiology Results  Results for orders placed during the hospital encounter of 05/16/24    Cardiac device check - [facility]    Narrative    [facility] ICD check.    Battery is end of life.    She will be scheduled for generator change.  Discussed with the patient.  She is agreeable for the procedure.      Verified      CARDIAC SURGERY:    Most Recent EKG Results  Results for orders placed or performed during the hospital encounter of 06/05/24   EKG 12-lead    Collection Time: 06/05/24  6:28 AM   Result Value Ref Range    QRS Duration 70 ms    [facility] QTC Calculation 442 ms    Narrative    Test Reason : I49.9,    Vent. Rate : 092 BPM     Atrial Rate : 092 BPM     P-R Int : 146 ms          QRS Dur : 070 ms      QT Int : 358 ms       P-R-T Axes : 076 078 079 degrees     QTc Int : 442 ms    Sinus rhythm with sinus arrhythmia with occasional Premature ventricular  complexes  Otherwise normal ECG  When compared with ECG of 16-MAY-2024 13:37,  Criteria for Septal infarct are no longer Present  Confirmed by Tristan Saleh MD ([id number]) on 6/6/2024 10:33:08 PM    Referred By: GLORIA DURON           Confirmed By:Tristan Saleh MD       The ASCVD Risk score (Aftab PACHECO, et al., 2019) failed to calculate for the following reasons:    The patient has a prior MI or stroke diagnosis    Review of patient's allergies indicates:  No Known Allergies    Past Medical History:   Diagnosis Date    Anxiety     CAD (coronary artery disease)     CHF (congestive heart failure)     Chronic low back pain     Diabetes 2/1/2012    Diabetes mellitus     Hyperkalemia 2/13/2022    Hypertension     Leukocytosis 2/13/2022    Metabolic acidosis with respiratory acidosis 2/13/2022     Past Surgical  History:   Procedure Laterality Date    ANGIOGRAM, CORONARY, WITH LEFT HEART CATHETERIZATION N/A 2024    Procedure: Angiogram, Coronary, with Left Heart Cath;  Surgeon: Dana Henriquez MD;  Location: [facility] CATH/EP LAB;  Service: Cardiology;  Laterality: N/A;    CARDIAC DEFIBRILLATOR PLACEMENT      CORONARY ARTERY BYPASS GRAFT      HYSTERECTOMY      REMOVAL OR REPLACEMENT, PULSE GENERATOR, ICD, DUAL LEAD  2024    Procedure: REPLACEMENT, PULSE GENERATOR, ICD, DUAL LEAD;  Surgeon: Rahul Suero MD;  Location: [facility] CATH/EP LAB;  Service: Cardiology;;     Social History     Tobacco Use    Smoking status: Former     Current packs/day: 0.00     Types: Cigarettes     Quit date:      Years since quittin.5    Smokeless tobacco: Never   Substance Use Topics    Alcohol use: No          REVIEW OF SYSTEMS  CONSTITUTIONAL: No chills, no fatigue, no fever.   EYES: No double vision, no blurred vision  NEURO: No headaches, no dizziness  RESPIRATORY: No cough, no wheezing.    CARDIOVASCULAR: See HPI   GI: No abdominal pain, no melena, no diarrhea, no nausea or vomiting.   : No  dysuria and frequency, no hematuria  SKIN: no bruising, no discoloration  ENDOCRINE: no polyphagia, no heat intolerance, no cold intolerance  PSYCHIATRIC: no depression, no anxiety, no memory loss  MUSCULOSKELETAL: no  neck pain, no muscle weakness,no back pain          Objective:        Vitals:    05/15/24 1501   BP: 118/72   Pulse: 84   Resp: 16       PHYSICAL EXAM  CONSTITUTIONAL: In no apparent distress  HEENT: Normocephalic. Pupils normal and conjunctivae normal. No pallor  NECK: No JVD  LUNGS: B/L air entry to the lungs, clear to auscultation. No rales, wheezing or rhonchi.  HEART: Normal rate and regular rhythm. Normal S1 and S2.  No murmur   ABDOMEN: soft, non-tender; bowel sounds normal  EXTREMITIES: No edema. Good palpable distal pulses.  NEURO: AAO X 3, no gross sensory or motor deficits  SKIN:  No rash  Psych:   "Normal affect    Lab Results   Component Value Date    WBC 14.56 (H) 06/05/2024    HGB 12.1 06/05/2024    HCT 38.8 06/05/2024     06/05/2024    CHOL 143 01/12/2024    TRIG 113 01/12/2024    HDL 45 01/12/2024    ALT 16 01/12/2024    AST 15 01/12/2024     07/03/2024    K 5.3 (H) 07/03/2024    CL 99 07/03/2024    CREATININE 1.8 (H) 07/03/2024    BUN 41 (H) 07/03/2024    CO2 23 07/03/2024    TSH 0.063 (L) 07/03/2024    INR 0.9 06/05/2024    HGBA1C 6.4 (H) 07/03/2024        @  Lab Results   Component Value Date    CHOL 143 01/12/2024    CHOL 148 12/01/2023    CHOL 149 05/12/2023     Lab Results   Component Value Date    HDL 45 01/12/2024    HDL 37 (L) 12/01/2023    HDL 38 (L) 05/12/2023     Lab Results   Component Value Date    LDLCALC 75.4 01/12/2024    LDLCALC 85.0 12/01/2023    LDLCALC 60.8 (L) 05/12/2023     No results found for: "DLDL"  Lab Results   Component Value Date    TRIG 113 01/12/2024    TRIG 130 12/01/2023    TRIG 251 (H) 05/12/2023       f1   Lab Results   Component Value Date    CHOLHDL 31.5 01/12/2024    CHOLHDL 25.0 12/01/2023    CHOLHDL 25.5 05/12/2023            Current Outpatient Medications   Medication Instructions    aspirin (ECOTRIN) 81 mg, Oral, Daily,      azelastine (ASTELIN) 137 mcg, Nasal, 2 times daily    blood sugar diagnostic (ACCU-CHEK GUIDE TEST STRIPS) Strp TEST AS DIRECTED FOUR TIMES DAILY    blood-glucose meter kit Use as instructed    clopidogreL (PLAVIX) 75 mg, Oral, Daily    coenzyme Q10 200 mg, Oral, 2 times daily,      DEXCOM G7  Misc Use to monitor glucose.    DEXCOM G7 SENSOR Katia Change every 10 days.    EScitalopram oxalate (LEXAPRO) 20 MG tablet TAKE 1/2 TABLET BY MOUTH TWICE DAILY    furosemide (LASIX) 40 mg, Oral, 2 times daily    insulin glargine, TOUJEO, (TOUJEO SOLOSTAR U-300 INSULIN) 300 unit/mL (1.5 mL) InPn pen Inject 30 units nightly.    lancets (LANCETS,THIN) Misc 1 each, Misc.(Non-Drug; Combo Route), 4 times daily    metoprolol succinate " "(TOPROL-XL) 100 mg, Oral, Daily    mirtazapine (REMERON) 7.5 mg, Oral, Nightly    nitroGLYCERIN (NITROSTAT) 0.3 mg, Sublingual, As needed (PRN)    omega-3 fatty acids/fish oil (FISH OIL-OMEGA-3 FATTY ACIDS) 300-1,000 mg capsule 1 capsule, Oral, Daily    ondansetron (ZOFRAN-ODT) 4 mg, Oral, Every 8 hours PRN    ondansetron (ZOFRAN-ODT) 4 mg, Oral, Every 6 hours PRN    pen needle, diabetic (DROPLET PEN NEEDLE) 32 gauge x 5/32" Ndle 1 each, Misc.(Non-Drug; Combo Route), 3 times daily    potassium citrate 99 mg Cap 1 capsule, Oral, Daily    ranolazine (RANEXA) 500 mg, Oral, 2 times daily    sacubitriL-valsartan (ENTRESTO) 24-26 mg per tablet 1 tablet, Oral, 2 times daily    semaglutide (OZEMPIC) 0.25 mg or 0.5 mg (2 mg/3 mL) pen injector Inject 0.25 mg into the skin every 7 days for 30 days, THEN 0.5 mg every 7 days.    simvastatin (ZOCOR) 40 mg, Oral, Nightly       Medication List with Changes/Refills   New Medications              SACUBITRIL-VALSARTAN (ENTRESTO) 24-26 MG PER TABLET    Take 1 tablet by mouth 2 (two) times daily.   Current Medications    ASPIRIN (ECOTRIN) 81 MG EC TABLET    Take 81 mg by mouth once daily.    AZELASTINE (ASTELIN) 137 MCG (0.1 %) NASAL SPRAY    1 spray (137 mcg total) by Nasal route 2 (two) times daily. for 7 days    BLOOD-GLUCOSE METER KIT    Use as instructed    COENZYME Q10 200 MG CAPSULE    Take 200 mg by mouth 2 (two) times daily.    DEXCOM G7  MISC    Use to monitor glucose.    DEXCOM G7 SENSOR SAGAR    Change every 10 days.    ESCITALOPRAM OXALATE (LEXAPRO) 20 MG TABLET    TAKE 1/2 TABLET BY MOUTH TWICE DAILY    FUROSEMIDE (LASIX) 40 MG TABLET    Take 1 tablet (40 mg total) by mouth 2 (two) times daily.    INSULIN GLARGINE, TOUJEO, (TOUJEO SOLOSTAR U-300 INSULIN) 300 UNIT/ML (1.5 ML) INPN PEN    Inject 30 units nightly.    LANCETS (LANCETS,THIN) MISC    1 each by Misc.(Non-Drug; Combo Route) route 4 (four) times daily.    METOPROLOL SUCCINATE (TOPROL-XL) 50 MG 24 HR TABLET   " " Take 2 tablets (100 mg total) by mouth once daily.    OMEGA-3 FATTY ACIDS/FISH OIL (FISH OIL-OMEGA-3 FATTY ACIDS) 300-1,000 MG CAPSULE    Take 1 capsule by mouth once daily.    ONDANSETRON (ZOFRAN-ODT) 4 MG TBDL    Take 1 tablet (4 mg total) by mouth every 8 (eight) hours as needed (nauseau).    PEN NEEDLE, DIABETIC (DROPLET PEN NEEDLE) 32 GAUGE X 5/32" NDLE    1 each by Misc.(Non-Drug; Combo Route) route 3 (three) times daily.    POTASSIUM CITRATE 99 MG CAP    Take 1 capsule by mouth once daily.    RANOLAZINE (RANEXA) 500 MG TB12    Take 1 tablet (500 mg total) by mouth 2 (two) times daily.    SEMAGLUTIDE (OZEMPIC) 0.25 MG OR 0.5 MG (2 MG/3 ML) PEN INJECTOR    Inject 0.25 mg into the skin every 7 days for 30 days, THEN 0.5 mg every 7 days.    SIMVASTATIN (ZOCOR) 40 MG TABLET    Take 1 tablet (40 mg total) by mouth every evening.                                                                  All pertinent labs, imaging, and EKGs reviewed.  Patient's most recent EKG tracing was personally interpreted by this provider.    Problem List Items Addressed This Visit          Cardiac/Vascular    CAD (coronary artery disease) (Chronic)    ICD (implantable cardioverter-defibrillator) in place (Chronic)    Overview     [facility] unit         Relevant Orders    Cardiac device check - [facility] (Completed)     Other Visit Diagnoses       Heart failure with reduced ejection fraction    -  Primary    Relevant Orders    Basic metabolic panel    Primary hypertension                Follow up in about 3 months (around 8/15/2024).     "

## 2024-08-06 ENCOUNTER — OFFICE VISIT (OUTPATIENT)
Dept: FAMILY MEDICINE | Facility: CLINIC | Age: 69
End: 2024-08-06
Payer: MEDICARE

## 2024-08-06 VITALS
TEMPERATURE: 98 F | DIASTOLIC BLOOD PRESSURE: 72 MMHG | SYSTOLIC BLOOD PRESSURE: 122 MMHG | RESPIRATION RATE: 16 BRPM | WEIGHT: 170.63 LBS | HEIGHT: 63 IN | OXYGEN SATURATION: 99 % | BODY MASS INDEX: 30.23 KG/M2 | HEART RATE: 79 BPM

## 2024-08-06 DIAGNOSIS — M54.42 CHRONIC LOW BACK PAIN WITH LEFT-SIDED SCIATICA, UNSPECIFIED BACK PAIN LATERALITY: Primary | ICD-10-CM

## 2024-08-06 DIAGNOSIS — Z12.11 SCREENING FOR COLON CANCER: ICD-10-CM

## 2024-08-06 DIAGNOSIS — E11.22 TYPE 2 DIABETES MELLITUS WITH STAGE 3A CHRONIC KIDNEY DISEASE, WITHOUT LONG-TERM CURRENT USE OF INSULIN: ICD-10-CM

## 2024-08-06 DIAGNOSIS — M20.12 VALGUS DEFORMITY OF BOTH GREAT TOES: ICD-10-CM

## 2024-08-06 DIAGNOSIS — Z12.39 ENCOUNTER FOR SCREENING FOR MALIGNANT NEOPLASM OF BREAST, UNSPECIFIED SCREENING MODALITY: ICD-10-CM

## 2024-08-06 DIAGNOSIS — M20.11 VALGUS DEFORMITY OF BOTH GREAT TOES: ICD-10-CM

## 2024-08-06 DIAGNOSIS — F31.9 BIPOLAR 1 DISORDER: ICD-10-CM

## 2024-08-06 DIAGNOSIS — N18.31 TYPE 2 DIABETES MELLITUS WITH STAGE 3A CHRONIC KIDNEY DISEASE, WITHOUT LONG-TERM CURRENT USE OF INSULIN: ICD-10-CM

## 2024-08-06 DIAGNOSIS — G89.29 CHRONIC LOW BACK PAIN WITH LEFT-SIDED SCIATICA, UNSPECIFIED BACK PAIN LATERALITY: Primary | ICD-10-CM

## 2024-08-06 DIAGNOSIS — Z12.31 ENCOUNTER FOR SCREENING MAMMOGRAM FOR MALIGNANT NEOPLASM OF BREAST: ICD-10-CM

## 2024-08-06 DIAGNOSIS — I50.32 CHRONIC DIASTOLIC CONGESTIVE HEART FAILURE: ICD-10-CM

## 2024-08-06 DIAGNOSIS — F10.21 HISTORY OF ALCOHOL DEPENDENCE: ICD-10-CM

## 2024-08-06 DIAGNOSIS — I73.9 PVD (PERIPHERAL VASCULAR DISEASE): ICD-10-CM

## 2024-08-06 PROCEDURE — 3044F HG A1C LEVEL LT 7.0%: CPT | Mod: HCNC,CPTII,S$GLB, | Performed by: FAMILY MEDICINE

## 2024-08-06 PROCEDURE — 1101F PT FALLS ASSESS-DOCD LE1/YR: CPT | Mod: HCNC,CPTII,S$GLB, | Performed by: FAMILY MEDICINE

## 2024-08-06 PROCEDURE — 99214 OFFICE O/P EST MOD 30 MIN: CPT | Mod: HCNC,S$GLB,, | Performed by: FAMILY MEDICINE

## 2024-08-06 PROCEDURE — 3061F NEG MICROALBUMINURIA REV: CPT | Mod: HCNC,CPTII,S$GLB, | Performed by: FAMILY MEDICINE

## 2024-08-06 PROCEDURE — 3288F FALL RISK ASSESSMENT DOCD: CPT | Mod: HCNC,CPTII,S$GLB, | Performed by: FAMILY MEDICINE

## 2024-08-06 PROCEDURE — 3066F NEPHROPATHY DOC TX: CPT | Mod: HCNC,CPTII,S$GLB, | Performed by: FAMILY MEDICINE

## 2024-08-06 PROCEDURE — 99999 PR PBB SHADOW E&M-EST. PATIENT-LVL V: CPT | Mod: PBBFAC,HCNC,, | Performed by: FAMILY MEDICINE

## 2024-08-06 PROCEDURE — 4010F ACE/ARB THERAPY RXD/TAKEN: CPT | Mod: HCNC,CPTII,S$GLB, | Performed by: FAMILY MEDICINE

## 2024-08-06 PROCEDURE — 1126F AMNT PAIN NOTED NONE PRSNT: CPT | Mod: HCNC,CPTII,S$GLB, | Performed by: FAMILY MEDICINE

## 2024-08-06 PROCEDURE — 3078F DIAST BP <80 MM HG: CPT | Mod: HCNC,CPTII,S$GLB, | Performed by: FAMILY MEDICINE

## 2024-08-06 PROCEDURE — 1159F MED LIST DOCD IN RCRD: CPT | Mod: HCNC,CPTII,S$GLB, | Performed by: FAMILY MEDICINE

## 2024-08-06 PROCEDURE — G2211 COMPLEX E/M VISIT ADD ON: HCPCS | Mod: HCNC,S$GLB,, | Performed by: FAMILY MEDICINE

## 2024-08-06 PROCEDURE — 3008F BODY MASS INDEX DOCD: CPT | Mod: HCNC,CPTII,S$GLB, | Performed by: FAMILY MEDICINE

## 2024-08-06 PROCEDURE — 3074F SYST BP LT 130 MM HG: CPT | Mod: HCNC,CPTII,S$GLB, | Performed by: FAMILY MEDICINE

## 2024-08-12 DIAGNOSIS — Z79.4 TYPE 2 DIABETES MELLITUS WITH HYPERGLYCEMIA, WITH LONG-TERM CURRENT USE OF INSULIN: ICD-10-CM

## 2024-08-12 DIAGNOSIS — E11.65 TYPE 2 DIABETES MELLITUS WITH HYPERGLYCEMIA, WITH LONG-TERM CURRENT USE OF INSULIN: ICD-10-CM

## 2024-08-12 RX ORDER — LANCETS 33 GAUGE
EACH MISCELLANEOUS
Qty: 200 EACH | Refills: 11 | Status: SHIPPED | OUTPATIENT
Start: 2024-08-12

## 2024-08-15 ENCOUNTER — HOSPITAL ENCOUNTER (OUTPATIENT)
Dept: RADIOLOGY | Facility: HOSPITAL | Age: 69
Discharge: HOME | End: 2024-08-15
Attending: FAMILY MEDICINE
Payer: MEDICARE

## 2024-08-15 DIAGNOSIS — Z12.39 ENCOUNTER FOR SCREENING FOR MALIGNANT NEOPLASM OF BREAST, UNSPECIFIED SCREENING MODALITY: ICD-10-CM

## 2024-08-15 DIAGNOSIS — Z12.31 ENCOUNTER FOR SCREENING MAMMOGRAM FOR MALIGNANT NEOPLASM OF BREAST: ICD-10-CM

## 2024-08-15 PROCEDURE — 77067 SCR MAMMO BI INCL CAD: CPT | Mod: 26,,, | Performed by: RADIOLOGY

## 2024-08-15 PROCEDURE — 77063 BREAST TOMOSYNTHESIS BI: CPT | Mod: 26,,, | Performed by: RADIOLOGY

## 2024-08-15 PROCEDURE — 77063 BREAST TOMOSYNTHESIS BI: CPT | Mod: TC,PO

## 2024-08-22 DIAGNOSIS — Z95.0 CARDIAC PACEMAKER IN SITU: Primary | ICD-10-CM

## 2024-08-23 ENCOUNTER — LAB VISIT (OUTPATIENT)
Dept: LAB | Facility: HOSPITAL | Age: 69
End: 2024-08-23
Attending: PHYSICIAN ASSISTANT
Payer: MEDICARE

## 2024-08-23 ENCOUNTER — OFFICE VISIT (OUTPATIENT)
Dept: ENDOCRINOLOGY | Facility: CLINIC | Age: 69
End: 2024-08-23
Payer: MEDICARE

## 2024-08-23 VITALS
WEIGHT: 169.56 LBS | TEMPERATURE: 98 F | HEART RATE: 69 BPM | DIASTOLIC BLOOD PRESSURE: 70 MMHG | SYSTOLIC BLOOD PRESSURE: 110 MMHG | HEIGHT: 63 IN | OXYGEN SATURATION: 99 % | BODY MASS INDEX: 30.04 KG/M2

## 2024-08-23 DIAGNOSIS — E11.59 HYPERTENSION ASSOCIATED WITH TYPE 2 DIABETES MELLITUS: ICD-10-CM

## 2024-08-23 DIAGNOSIS — I15.2 HYPERTENSION ASSOCIATED WITH TYPE 2 DIABETES MELLITUS: ICD-10-CM

## 2024-08-23 DIAGNOSIS — E11.69 COMBINED HYPERLIPIDEMIA ASSOCIATED WITH TYPE 2 DIABETES MELLITUS: ICD-10-CM

## 2024-08-23 DIAGNOSIS — E11.65 TYPE 2 DIABETES MELLITUS WITH HYPERGLYCEMIA, WITH LONG-TERM CURRENT USE OF INSULIN: ICD-10-CM

## 2024-08-23 DIAGNOSIS — M85.80 OSTEOPENIA, UNSPECIFIED LOCATION: ICD-10-CM

## 2024-08-23 DIAGNOSIS — E05.90 SUBCLINICAL HYPERTHYROIDISM: ICD-10-CM

## 2024-08-23 DIAGNOSIS — Z79.4 TYPE 2 DIABETES MELLITUS WITH HYPERGLYCEMIA, WITH LONG-TERM CURRENT USE OF INSULIN: ICD-10-CM

## 2024-08-23 DIAGNOSIS — E11.65 TYPE 2 DIABETES MELLITUS WITH HYPERGLYCEMIA, WITH LONG-TERM CURRENT USE OF INSULIN: Primary | ICD-10-CM

## 2024-08-23 DIAGNOSIS — Z79.4 TYPE 2 DIABETES MELLITUS WITH HYPERGLYCEMIA, WITH LONG-TERM CURRENT USE OF INSULIN: Primary | ICD-10-CM

## 2024-08-23 DIAGNOSIS — Z78.0 POSTMENOPAUSAL: ICD-10-CM

## 2024-08-23 DIAGNOSIS — E78.2 COMBINED HYPERLIPIDEMIA ASSOCIATED WITH TYPE 2 DIABETES MELLITUS: ICD-10-CM

## 2024-08-23 DIAGNOSIS — E55.9 HYPOVITAMINOSIS D: ICD-10-CM

## 2024-08-23 DIAGNOSIS — E66.01 MORBID (SEVERE) OBESITY DUE TO EXCESS CALORIES: ICD-10-CM

## 2024-08-23 LAB
ALBUMIN SERPL BCP-MCNC: 3.5 G/DL (ref 3.5–5.2)
ANION GAP SERPL CALC-SCNC: 9 MMOL/L (ref 8–16)
BUN SERPL-MCNC: 37 MG/DL (ref 8–23)
CALCIUM SERPL-MCNC: 9.7 MG/DL (ref 8.7–10.5)
CHLORIDE SERPL-SCNC: 101 MMOL/L (ref 95–110)
CO2 SERPL-SCNC: 26 MMOL/L (ref 23–29)
CREAT SERPL-MCNC: 1.6 MG/DL (ref 0.5–1.4)
EST. GFR  (NO RACE VARIABLE): 34.3 ML/MIN/1.73 M^2
GLUCOSE SERPL-MCNC: 126 MG/DL (ref 70–110)
PHOSPHATE SERPL-MCNC: 3.3 MG/DL (ref 2.7–4.5)
POTASSIUM SERPL-SCNC: 5.2 MMOL/L (ref 3.5–5.1)
SODIUM SERPL-SCNC: 136 MMOL/L (ref 136–145)

## 2024-08-23 PROCEDURE — 83520 IMMUNOASSAY QUANT NOS NONAB: CPT | Mod: HCNC | Performed by: PHYSICIAN ASSISTANT

## 2024-08-23 PROCEDURE — 80069 RENAL FUNCTION PANEL: CPT | Mod: HCNC | Performed by: PHYSICIAN ASSISTANT

## 2024-08-23 PROCEDURE — 84445 ASSAY OF TSI GLOBULIN: CPT | Mod: HCNC | Performed by: PHYSICIAN ASSISTANT

## 2024-08-23 PROCEDURE — 99999 PR PBB SHADOW E&M-EST. PATIENT-LVL IV: CPT | Mod: PBBFAC,HCNC,, | Performed by: PHYSICIAN ASSISTANT

## 2024-08-23 PROCEDURE — 36415 COLL VENOUS BLD VENIPUNCTURE: CPT | Mod: HCNC,PO | Performed by: PHYSICIAN ASSISTANT

## 2024-08-23 RX ORDER — SEMAGLUTIDE 1.34 MG/ML
1 INJECTION, SOLUTION SUBCUTANEOUS
Qty: 3 ML | Refills: 11 | Status: SHIPPED | OUTPATIENT
Start: 2024-08-23 | End: 2025-08-23

## 2024-08-23 NOTE — PROGRESS NOTES
CC: This 69 y.o. female presents for management of T2DM  and chronic conditions pending review including HTN, HLP    HPI: was diagnosed with T2DM in in .   Admitted in  for bs of 500.   Family hx of DM: mother, brother  Fhx of thyroid disease: none  Denies missing doses of DM medication.   hypoglycemia at home: none  monitoring BG at home:        Av  TIR: 82%  Low: 1%    She has a few pp excursions.    Diet:   3 meals daily.  No snacks. Avoids sugary beverages.     Exercise: none. Walks in the store daily.    CURRENT DM MEDS: Levemir 30 u qhs, Novolog 5 u>200, Ozempic 0.5 mg weekly.     Previous meds:  Metformin-dc at discharge in     Standards of Care:  Eye exam:  [facility]--didn't do DM exam  Podiatry exam:   DE: never    DEXA scan:   Spine: -1.7  Lfn: -1.7  Fx: 9.5%  Hfx: 1.2%    No falls, fractures or steriod injections.  Not taking ca or vd.    Subclinical hyperthyroidism  No diarrhea, palpitations, sweating, anxiety  PMHx, PSHx: reviewed in epic.  Social Hx: no E/T use. Former smoker who quit in .     Wt Readings from Last 10 Encounters:   24 76.9 kg (169 lb 8.5 oz)   24 77.4 kg (170 lb 10.2 oz)   24 75.8 kg (167 lb)   05/15/24 75.8 kg (167 lb)   24 77.5 kg (170 lb 13.7 oz)   24 75.8 kg (167 lb)   24 76.5 kg (168 lb 10.4 oz)   24 75.8 kg (167 lb)   24 76.2 kg (167 lb 15.9 oz)   01/15/24 78.2 kg (172 lb 6.4 oz)      ROS:   Gen: Appetite good, no weight gain or loss, denies fatigue and weakness.  Skin: Skin is intact and heals well, no rashes, no hair changes  Eyes: Denies visual disturbances  Resp: no SOB or URBAN, no cough  Cardiac: No palpitations, chest pain, no edema   GI: No nausea or vomiting, diarrhea, constipation, or abdominal pain.  /GYN: No nocturia, burning or pain.   MS/Neuro: Denies numbness/ tingling in BLE; Gait steady, speech clear  Psych: Denies drug/ETOH abuse, no hx of depression.  Other systems:  "negative.    /70 (BP Location: Right arm, Patient Position: Sitting, BP Method: Small (Manual))   Pulse 69   Temp 97.9 °F (36.6 °C) (Oral)   Ht 5' 3" (1.6 m)   Wt 76.9 kg (169 lb 8.5 oz)   SpO2 99%   BMI 30.03 kg/m²      PE:  GENERAL: Well developed, well nourished.  PSYCH: AAOx3, appropriate mood and affect, pleasant expression, conversant, appears relaxed, well groomed.   EYES: Conjunctiva, corneas clear  NEURO: ambulates with a cane, Gait steady  SKIN: Skin warm and dry no acanthosis nigracans.  3/24  Foot Exam: no sores or macerations noted.     Protective Sensation (w/ 10 gram monofilament):  Right: Intact  Left: Intact    Visual Inspection:  Normal -  Bilateral, Nails Intact - without Evidence of Foot Deformity- Bilateral, and Callus -  Bilateral    Pedal Pulses:   Right: Present  Left: Present    Posterior Tibialis Pulses:   Right:Present  Left: Present     Vibratory Sensation  Right:Decreased  Left:Decreased    Personally reviewed labs below:    No visits with results within 1 Month(s) from this visit.   Latest known visit with results is:   Lab Visit on 07/03/2024   Component Date Value Ref Range Status    TSH 07/03/2024 0.063 (L)  0.400 - 4.000 uIU/mL Final    Free T4 07/03/2024 1.06  0.71 - 1.51 ng/dL Final    Hemoglobin A1C 07/03/2024 6.4 (H)  4.0 - 5.6 % Final    Comment: ADA Screening Guidelines:  5.7-6.4%  Consistent with prediabetes  >or=6.5%  Consistent with diabetes    High levels of fetal hemoglobin interfere with the HbA1C  assay. Heterozygous hemoglobin variants (HbS, HgC, etc)do  not significantly interfere with this assay.   However, presence of multiple variants may affect accuracy.      Estimated Avg Glucose 07/03/2024 137 (H)  68 - 131 mg/dL Final    Glucose 07/03/2024 152 (H)  70 - 110 mg/dL Final    Sodium 07/03/2024 136  136 - 145 mmol/L Final    Potassium 07/03/2024 5.3 (H)  3.5 - 5.1 mmol/L Final    *No Visible Hemolysis    Chloride 07/03/2024 99  95 - 110 mmol/L Final    " CO2 07/03/2024 23  23 - 29 mmol/L Final    BUN 07/03/2024 41 (H)  8 - 23 mg/dL Final    Calcium 07/03/2024 10.4  8.7 - 10.5 mg/dL Final    Creatinine 07/03/2024 1.8 (H)  0.5 - 1.4 mg/dL Final    Albumin 07/03/2024 3.5  3.5 - 5.2 g/dL Final    Phosphorus 07/03/2024 4.6 (H)  2.7 - 4.5 mg/dL Final    eGFR 07/03/2024 29.8 (A)  >60 mL/min/1.73 m^2 Final    Anion Gap 07/03/2024 14  8 - 16 mmol/L Final    Microalbumin, Urine 07/03/2024 <5.0  ug/mL Final    Creatinine, Urine 07/03/2024 58.0  15.0 - 325.0 mg/dL Final    Microalb/Creat Ratio 07/03/2024 Unable to calculate  0.0 - 30.0 ug/mg Final         ASSESSMENT and PLAN:    1. Type 2 diabetes mellitus with hyperglycemia, with long-term current use of insulin  Hemoglobin A1C    Comprehensive Metabolic Panel    Lipid Panel    Microalbumin/Creatinine Ratio, Urine    semaglutide (OZEMPIC) 1 mg/dose (4 mg/3 mL)    CANCELED: Renal Function Panel      2. Hypertension associated with type 2 diabetes mellitus        3. Combined hyperlipidemia associated with type 2 diabetes mellitus        4. Subclinical hyperthyroidism  Thyroid Stimulating Immunoglobulin      5. Postmenopausal        6. Osteopenia, unspecified location        7. Hypovitaminosis D        8. Morbid (severe) obesity due to excess calories            T2DM with hyperglycemia-  A1c is below goal.    Medication Changes  Increase Ozempic to 1 mg weekly.     Levemir 30 u qhs    Novolog 5 u>200     Continue dexcom G7.  Discussed DM, progression of disease, long term complications, tx options.   Discussed A1c and BG goals.   Reviewed  hypoglycemia, s/s and appropriate tx.   Instructed to monitor BG and bring meter/ log to every clinic visit.   - takes ASA, ACEi, statin    HTN - controlled, continue meds as previously prescribed and monitor.     HLP - LDL , on statin therapy, LFTs WNL  Postmenopausal/osteopenia-DEXA scan. Start wt bearing and resistance exercises. Start 1500 mg of ca and 2000 IU of vd.  Subclinical  Hyperthyroidism-check abs. Not sx.   Hypovitaminosis d-stable-check vitamin D  Obesity-Body mass index is 30.03 kg/m². Increase exercise to 30 min qd.     Follow-Up   Rp, TSI, TRAB  F/u in 3 mths w/ labs prior - TFTs, A1c , c-peptide, dme, cmp, lp, mac

## 2024-08-26 LAB — TSI SER-ACNC: <0.1 IU/L

## 2024-08-27 LAB — TSH RECEP AB SER-ACNC: <1.1 IU/L (ref 0–1.75)

## 2024-09-09 DIAGNOSIS — I25.10 CORONARY ARTERY DISEASE DUE TO LIPID RICH PLAQUE: Chronic | ICD-10-CM

## 2024-09-09 DIAGNOSIS — I25.83 CORONARY ARTERY DISEASE DUE TO LIPID RICH PLAQUE: Chronic | ICD-10-CM

## 2024-09-09 RX ORDER — NITROGLYCERIN 0.3 MG/1
0.3 TABLET SUBLINGUAL
Qty: 100 TABLET | Refills: 0 | Status: SHIPPED | OUTPATIENT
Start: 2024-09-09

## 2024-09-12 DIAGNOSIS — E11.69 HYPERLIPIDEMIA ASSOCIATED WITH TYPE 2 DIABETES MELLITUS: ICD-10-CM

## 2024-09-12 DIAGNOSIS — E78.5 HYPERLIPIDEMIA ASSOCIATED WITH TYPE 2 DIABETES MELLITUS: ICD-10-CM

## 2024-09-13 RX ORDER — SIMVASTATIN 40 MG/1
40 TABLET, FILM COATED ORAL NIGHTLY
Qty: 90 TABLET | Refills: 3 | Status: SHIPPED | OUTPATIENT
Start: 2024-09-13 | End: 2025-09-08

## 2024-09-17 ENCOUNTER — TELEPHONE (OUTPATIENT)
Dept: FAMILY MEDICINE | Facility: CLINIC | Age: 69
End: 2024-09-17
Payer: MEDICARE

## 2024-09-17 DIAGNOSIS — M10.9 GOUT, UNSPECIFIED CAUSE, UNSPECIFIED CHRONICITY, UNSPECIFIED SITE: Primary | ICD-10-CM

## 2024-09-17 NOTE — TELEPHONE ENCOUNTER
Patient reports experiencing experiencing gout symptoms to foot.  Patient states she went to the ER in the past for gout and was given a prescription for Indomethacin; and would like to know if Dr. Marie will send in a prescription to the pharmacy.  Offered to schedule an appointment; patient requesting to send message to provider.  Please advise. Thank you.

## 2024-09-17 NOTE — TELEPHONE ENCOUNTER
----- Message from Myron Conroy sent at 9/17/2024 11:35 AM CDT -----  Contact: Self  Type: Needs Medical Advice  Who Called:  Patient    Symptoms (please be specific):  Gout  How long has patient had these symptoms:  Today    Pharmacy name and phone #:    [id number] - [place], [place] - [address]  [address]  [facility] [address]  Phone: [phone] Fax: [phone]    [facility] [place]  [address]  [facility] [address]  Phone: [phone] Fax: [phone]    Best Call Back Number: [phone]  Additional Information: Patient is requesting a call back, she states she has gout and his requesting a refill for indomethacin (INDOCIN) 50 MG capsule

## 2024-09-17 NOTE — TELEPHONE ENCOUNTER
Myron Conroy Staff     ----- Message from Myron Conroy sent at 9/17/2024 11:35 AM CDT -----  Contact: Self  Type: Needs Medical Advice  Who Called:  Patient    Symptoms (please be specific):  Gout  How long has patient had these symptoms:  Today    Pharmacy name and phone #:    [id number] - [place], [place] - [address] & [address]  [address]  [facility] [address]  Phone: [phone] Fax: [phone]    [facility]  [address]  [facility] [address]  Phone: [phone] Fax: [phone]    Best Call Back Number: [phone]  Additional Information: Patient is requesting a call back, she states she has gout and his requesting a refill for indomethacin (INDOCIN) 50 MG capsule

## 2024-09-19 ENCOUNTER — HOSPITAL ENCOUNTER (OUTPATIENT)
Dept: CARDIOLOGY | Facility: CLINIC | Age: 69
Discharge: HOME | End: 2024-09-19
Attending: INTERNAL MEDICINE
Payer: MEDICARE

## 2024-09-19 DIAGNOSIS — Z95.0 CARDIAC PACEMAKER IN SITU: ICD-10-CM

## 2024-09-19 LAB
CHARGE TIME (SEC): 7.8 SEC
HV IMPEDANCE (OHM): 50 OHM
IMPEDANCE RA LEAD: 440 OHMS
P/R-WAVE RA LEAD: 6.9 MV
SVC IMPEDANCE (OHM): 64 OHM
THRESHOLD MS RA LEAD: 0.5 MS
THRESHOLD V RA LEAD: 0.75 V

## 2024-10-01 DIAGNOSIS — E05.90 SUBCLINICAL HYPERTHYROIDISM: Primary | ICD-10-CM

## 2024-10-07 DIAGNOSIS — I25.83 CORONARY ARTERY DISEASE DUE TO LIPID RICH PLAQUE: Chronic | ICD-10-CM

## 2024-10-07 DIAGNOSIS — I25.10 CORONARY ARTERY DISEASE DUE TO LIPID RICH PLAQUE: Chronic | ICD-10-CM

## 2024-10-07 RX ORDER — NITROGLYCERIN 0.3 MG/1
TABLET SUBLINGUAL
Qty: 100 TABLET | Refills: 3 | Status: SHIPPED | OUTPATIENT
Start: 2024-10-07

## 2024-10-07 NOTE — TELEPHONE ENCOUNTER
Refill Decision Note   Celine Steen  is requesting a refill authorization.  Brief Assessment and Rationale for Refill:  Approve     Medication Therapy Plan:         Comments:     Note composed:1:26 PM 10/07/2024

## 2024-10-07 NOTE — TELEPHONE ENCOUNTER
No care due was identified.  [facility] Embedded Care Due Messages. Reference number: [id number].   10/07/2024 3:50:35 AM CDT

## 2024-10-22 ENCOUNTER — HOSPITAL ENCOUNTER (OUTPATIENT)
Dept: RADIOLOGY | Facility: HOSPITAL | Age: 69
Discharge: HOME | End: 2024-10-22
Attending: PHYSICIAN ASSISTANT
Payer: MEDICARE

## 2024-10-22 DIAGNOSIS — E05.90 SUBCLINICAL HYPERTHYROIDISM: ICD-10-CM

## 2024-10-22 PROCEDURE — 78014 THYROID IMAGING W/BLOOD FLOW: CPT | Mod: 26,,, | Performed by: RADIOLOGY

## 2024-10-22 PROCEDURE — 78014 THYROID IMAGING W/BLOOD FLOW: CPT | Mod: TC

## 2024-10-22 PROCEDURE — A9516 IODINE I-123 SOD IODIDE MIC: HCPCS | Performed by: PHYSICIAN ASSISTANT

## 2024-10-22 RX ORDER — SODIUM IODIDE I 123 200 UCI/1
362.8 CAPSULE, GELATIN COATED ORAL
Status: COMPLETED | OUTPATIENT
Start: 2024-10-22 | End: 2024-10-22

## 2024-10-22 RX ADMIN — SODIUM IODIDE I 123 362.8 MICROCI: 200 CAPSULE, GELATIN COATED ORAL at 08:10

## 2024-10-23 ENCOUNTER — HOSPITAL ENCOUNTER (OUTPATIENT)
Dept: RADIOLOGY | Facility: HOSPITAL | Age: 69
Discharge: HOME | End: 2024-10-23
Attending: PHYSICIAN ASSISTANT
Payer: MEDICARE

## 2024-10-25 ENCOUNTER — CLINICAL SUPPORT (OUTPATIENT)
Dept: CARDIOLOGY | Facility: CLINIC | Age: 69
End: 2024-10-25

## 2024-10-25 ENCOUNTER — HOSPITAL ENCOUNTER (OUTPATIENT)
Dept: CARDIOLOGY | Facility: CLINIC | Age: 69
Discharge: HOME | End: 2024-10-25
Attending: INTERNAL MEDICINE
Payer: MEDICARE

## 2024-10-25 DIAGNOSIS — Z95.810 PRESENCE OF AUTOMATIC (IMPLANTABLE) CARDIAC DEFIBRILLATOR: ICD-10-CM

## 2024-10-25 DIAGNOSIS — I49.8 OTHER SPECIFIED CARDIAC ARRHYTHMIAS: ICD-10-CM

## 2024-11-03 DIAGNOSIS — E11.59 TYPE 2 DIABETES MELLITUS WITH OTHER CIRCULATORY COMPLICATION, WITHOUT LONG-TERM CURRENT USE OF INSULIN: ICD-10-CM

## 2024-11-03 NOTE — TELEPHONE ENCOUNTER
Care Due:                  Date            Visit Type   Department     Provider  --------------------------------------------------------------------------------                                EP -                              PRIMARY      [facility]  Last Visit: 08-      CARE ([facility])   JAZIEL Marie                              EP -                              PRIMARY      [facility]  Next Visit: 08-      CARE ([facility])   JAZIEL Marie                                                            Last  Test          Frequency    Reason                     Performed    Due Date  --------------------------------------------------------------------------------    CMP.........  12 months..  mirtazapine, simvastatin.  01- 01-    Lipid Panel.  12 months..  simvastatin..............  01- 01-    [facility] Embedded Care Due Messages. Reference number: [id number].   11/03/2024 11:03:38 AM CST

## 2024-11-04 RX ORDER — PEN NEEDLE, DIABETIC 30 GX3/16"
NEEDLE, DISPOSABLE MISCELLANEOUS
Qty: 300 EACH | Refills: 3 | Status: SHIPPED | OUTPATIENT
Start: 2024-11-04

## 2024-11-04 RX ORDER — PEN NEEDLE, DIABETIC 32GX 5/32"
NEEDLE, DISPOSABLE MISCELLANEOUS
Qty: 100 EACH | Refills: 3 | Status: SHIPPED | OUTPATIENT
Start: 2024-11-04 | End: 2024-11-04

## 2024-11-04 NOTE — TELEPHONE ENCOUNTER
Refill Decision Note   Celine Steen  is requesting a refill authorization.  Brief Assessment and Rationale for Refill:  Approve     Medication Therapy Plan:  FLOS      Comments:     Note composed:11:09 AM 11/04/2024

## 2024-11-25 ENCOUNTER — CLINICAL SUPPORT (OUTPATIENT)
Dept: CARDIOLOGY | Facility: CLINIC | Age: 69
End: 2024-11-25

## 2024-11-25 DIAGNOSIS — I49.8 OTHER SPECIFIED CARDIAC ARRHYTHMIAS: ICD-10-CM

## 2024-11-25 DIAGNOSIS — Z95.810 PRESENCE OF AUTOMATIC (IMPLANTABLE) CARDIAC DEFIBRILLATOR: ICD-10-CM

## 2024-12-03 ENCOUNTER — PATIENT OUTREACH (OUTPATIENT)
Dept: ADMINISTRATIVE | Facility: HOSPITAL | Age: 69
End: 2024-12-03
Payer: MEDICARE

## 2024-12-09 ENCOUNTER — LAB VISIT (OUTPATIENT)
Dept: LAB | Facility: HOSPITAL | Age: 69
End: 2024-12-09
Attending: PHYSICIAN ASSISTANT
Payer: MEDICARE

## 2024-12-09 DIAGNOSIS — Z79.4 TYPE 2 DIABETES MELLITUS WITH HYPERGLYCEMIA, WITH LONG-TERM CURRENT USE OF INSULIN: ICD-10-CM

## 2024-12-09 DIAGNOSIS — E11.65 TYPE 2 DIABETES MELLITUS WITH HYPERGLYCEMIA, WITH LONG-TERM CURRENT USE OF INSULIN: ICD-10-CM

## 2024-12-09 LAB
ALBUMIN SERPL BCP-MCNC: 3.6 G/DL (ref 3.5–5.2)
ALP SERPL-CCNC: 54 U/L (ref 40–150)
ALT SERPL W/O P-5'-P-CCNC: 12 U/L (ref 10–44)
ANION GAP SERPL CALC-SCNC: 9 MMOL/L (ref 8–16)
AST SERPL-CCNC: 12 U/L (ref 10–40)
BILIRUB SERPL-MCNC: 0.4 MG/DL (ref 0.1–1)
BUN SERPL-MCNC: 25 MG/DL (ref 8–23)
CALCIUM SERPL-MCNC: 10.2 MG/DL (ref 8.7–10.5)
CHLORIDE SERPL-SCNC: 102 MMOL/L (ref 95–110)
CHOLEST SERPL-MCNC: 165 MG/DL (ref 120–199)
CHOLEST/HDLC SERPL: 3.5 {RATIO} (ref 2–5)
CO2 SERPL-SCNC: 26 MMOL/L (ref 23–29)
CREAT SERPL-MCNC: 1.4 MG/DL (ref 0.5–1.4)
EST. GFR  (NO RACE VARIABLE): 40.2 ML/MIN/1.73 M^2
ESTIMATED AVG GLUCOSE: 120 MG/DL (ref 68–131)
GLUCOSE SERPL-MCNC: 127 MG/DL (ref 70–110)
HBA1C MFR BLD: 5.8 % (ref 4–5.6)
HDLC SERPL-MCNC: 47 MG/DL (ref 40–75)
HDLC SERPL: 28.5 % (ref 20–50)
LDLC SERPL CALC-MCNC: 86.8 MG/DL (ref 63–159)
NONHDLC SERPL-MCNC: 118 MG/DL
POTASSIUM SERPL-SCNC: 4.6 MMOL/L (ref 3.5–5.1)
PROT SERPL-MCNC: 7.9 G/DL (ref 6–8.4)
SODIUM SERPL-SCNC: 137 MMOL/L (ref 136–145)
T4 FREE SERPL-MCNC: 0.94 NG/DL (ref 0.71–1.51)
TRIGL SERPL-MCNC: 156 MG/DL (ref 30–150)
TSH SERPL DL<=0.005 MIU/L-ACNC: 0.02 UIU/ML (ref 0.4–4)

## 2024-12-09 PROCEDURE — 86341 ISLET CELL ANTIBODY: CPT | Mod: 91,HCNC | Performed by: PHYSICIAN ASSISTANT

## 2024-12-09 PROCEDURE — 84681 ASSAY OF C-PEPTIDE: CPT | Mod: HCNC | Performed by: PHYSICIAN ASSISTANT

## 2024-12-09 PROCEDURE — 83036 HEMOGLOBIN GLYCOSYLATED A1C: CPT | Mod: HCNC | Performed by: PHYSICIAN ASSISTANT

## 2024-12-09 PROCEDURE — 84439 ASSAY OF FREE THYROXINE: CPT | Mod: HCNC | Performed by: PHYSICIAN ASSISTANT

## 2024-12-09 PROCEDURE — 36415 COLL VENOUS BLD VENIPUNCTURE: CPT | Mod: HCNC,PO | Performed by: PHYSICIAN ASSISTANT

## 2024-12-09 PROCEDURE — 80061 LIPID PANEL: CPT | Mod: HCNC | Performed by: PHYSICIAN ASSISTANT

## 2024-12-09 PROCEDURE — 84443 ASSAY THYROID STIM HORMONE: CPT | Mod: HCNC | Performed by: PHYSICIAN ASSISTANT

## 2024-12-09 PROCEDURE — 80053 COMPREHEN METABOLIC PANEL: CPT | Mod: HCNC | Performed by: PHYSICIAN ASSISTANT

## 2024-12-10 LAB — C PEPTIDE SERPL-MCNC: 8.75 NG/ML (ref 0.78–5.19)

## 2024-12-14 DIAGNOSIS — E11.59 HYPERTENSION ASSOCIATED WITH TYPE 2 DIABETES MELLITUS: ICD-10-CM

## 2024-12-14 DIAGNOSIS — I15.2 HYPERTENSION ASSOCIATED WITH TYPE 2 DIABETES MELLITUS: ICD-10-CM

## 2024-12-14 NOTE — TELEPHONE ENCOUNTER
No care due was identified.  [facility] Embedded Care Due Messages. Reference number: [id number].   12/14/2024 3:53:08 AM CST

## 2024-12-15 RX ORDER — METOPROLOL SUCCINATE 50 MG/1
100 TABLET, EXTENDED RELEASE ORAL
Qty: 180 TABLET | Refills: 2 | Status: SHIPPED | OUTPATIENT
Start: 2024-12-15

## 2024-12-15 NOTE — TELEPHONE ENCOUNTER
Refill Decision Note   Celine Steen  is requesting a refill authorization.  Brief Assessment and Rationale for Refill:  Approve     Medication Therapy Plan:         Comments:     Note composed:8:22 AM 12/15/2024

## 2024-12-16 ENCOUNTER — OFFICE VISIT (OUTPATIENT)
Dept: ENDOCRINOLOGY | Facility: CLINIC | Age: 69
End: 2024-12-16
Payer: MEDICARE

## 2024-12-16 VITALS
BODY MASS INDEX: 28.82 KG/M2 | OXYGEN SATURATION: 98 % | SYSTOLIC BLOOD PRESSURE: 104 MMHG | DIASTOLIC BLOOD PRESSURE: 70 MMHG | HEART RATE: 93 BPM | HEIGHT: 63 IN | TEMPERATURE: 99 F | WEIGHT: 162.69 LBS

## 2024-12-16 DIAGNOSIS — E11.69 COMBINED HYPERLIPIDEMIA ASSOCIATED WITH TYPE 2 DIABETES MELLITUS: ICD-10-CM

## 2024-12-16 DIAGNOSIS — Z79.4 TYPE 2 DIABETES MELLITUS WITH HYPERGLYCEMIA, WITH LONG-TERM CURRENT USE OF INSULIN: Primary | ICD-10-CM

## 2024-12-16 DIAGNOSIS — E55.9 HYPOVITAMINOSIS D: ICD-10-CM

## 2024-12-16 DIAGNOSIS — Z78.0 POSTMENOPAUSAL: ICD-10-CM

## 2024-12-16 DIAGNOSIS — Z91.89 HIGH RISK FOR HIP FRACTURE: ICD-10-CM

## 2024-12-16 DIAGNOSIS — Z78.0 OSTEOPENIA AFTER MENOPAUSE: ICD-10-CM

## 2024-12-16 DIAGNOSIS — M85.80 OSTEOPENIA AFTER MENOPAUSE: ICD-10-CM

## 2024-12-16 DIAGNOSIS — I15.2 HYPERTENSION ASSOCIATED WITH TYPE 2 DIABETES MELLITUS: ICD-10-CM

## 2024-12-16 DIAGNOSIS — E78.2 COMBINED HYPERLIPIDEMIA ASSOCIATED WITH TYPE 2 DIABETES MELLITUS: ICD-10-CM

## 2024-12-16 DIAGNOSIS — E05.00 GRAVES' DISEASE: ICD-10-CM

## 2024-12-16 DIAGNOSIS — E11.59 HYPERTENSION ASSOCIATED WITH TYPE 2 DIABETES MELLITUS: ICD-10-CM

## 2024-12-16 DIAGNOSIS — E11.65 TYPE 2 DIABETES MELLITUS WITH HYPERGLYCEMIA, WITH LONG-TERM CURRENT USE OF INSULIN: Primary | ICD-10-CM

## 2024-12-16 PROCEDURE — 99214 OFFICE O/P EST MOD 30 MIN: CPT | Mod: HCNC,S$GLB,, | Performed by: PHYSICIAN ASSISTANT

## 2024-12-16 PROCEDURE — 1126F AMNT PAIN NOTED NONE PRSNT: CPT | Mod: HCNC,CPTII,S$GLB, | Performed by: PHYSICIAN ASSISTANT

## 2024-12-16 PROCEDURE — 3078F DIAST BP <80 MM HG: CPT | Mod: HCNC,CPTII,S$GLB, | Performed by: PHYSICIAN ASSISTANT

## 2024-12-16 PROCEDURE — 3008F BODY MASS INDEX DOCD: CPT | Mod: HCNC,CPTII,S$GLB, | Performed by: PHYSICIAN ASSISTANT

## 2024-12-16 PROCEDURE — 1160F RVW MEDS BY RX/DR IN RCRD: CPT | Mod: HCNC,CPTII,S$GLB, | Performed by: PHYSICIAN ASSISTANT

## 2024-12-16 PROCEDURE — 1101F PT FALLS ASSESS-DOCD LE1/YR: CPT | Mod: HCNC,CPTII,S$GLB, | Performed by: PHYSICIAN ASSISTANT

## 2024-12-16 PROCEDURE — 1159F MED LIST DOCD IN RCRD: CPT | Mod: HCNC,CPTII,S$GLB, | Performed by: PHYSICIAN ASSISTANT

## 2024-12-16 PROCEDURE — 95251 CONT GLUC MNTR ANALYSIS I&R: CPT | Mod: HCNC,S$GLB,, | Performed by: PHYSICIAN ASSISTANT

## 2024-12-16 PROCEDURE — 3044F HG A1C LEVEL LT 7.0%: CPT | Mod: HCNC,CPTII,S$GLB, | Performed by: PHYSICIAN ASSISTANT

## 2024-12-16 PROCEDURE — 99999 PR PBB SHADOW E&M-EST. PATIENT-LVL V: CPT | Mod: PBBFAC,HCNC,, | Performed by: PHYSICIAN ASSISTANT

## 2024-12-16 PROCEDURE — 3288F FALL RISK ASSESSMENT DOCD: CPT | Mod: HCNC,CPTII,S$GLB, | Performed by: PHYSICIAN ASSISTANT

## 2024-12-16 PROCEDURE — 3061F NEG MICROALBUMINURIA REV: CPT | Mod: HCNC,CPTII,S$GLB, | Performed by: PHYSICIAN ASSISTANT

## 2024-12-16 PROCEDURE — 3066F NEPHROPATHY DOC TX: CPT | Mod: HCNC,CPTII,S$GLB, | Performed by: PHYSICIAN ASSISTANT

## 2024-12-16 PROCEDURE — 4010F ACE/ARB THERAPY RXD/TAKEN: CPT | Mod: HCNC,CPTII,S$GLB, | Performed by: PHYSICIAN ASSISTANT

## 2024-12-16 PROCEDURE — 3074F SYST BP LT 130 MM HG: CPT | Mod: HCNC,CPTII,S$GLB, | Performed by: PHYSICIAN ASSISTANT

## 2024-12-16 RX ORDER — SEMAGLUTIDE 2.68 MG/ML
2 INJECTION, SOLUTION SUBCUTANEOUS
Qty: 9 ML | Refills: 3 | Status: SHIPPED | OUTPATIENT
Start: 2024-12-16 | End: 2025-12-16

## 2024-12-16 RX ORDER — ZOLEDRONIC ACID 5 MG/100ML
5 INJECTION, SOLUTION INTRAVENOUS
OUTPATIENT
Start: 2024-12-17

## 2024-12-16 RX ORDER — SODIUM CHLORIDE 0.9 % (FLUSH) 0.9 %
10 SYRINGE (ML) INJECTION
OUTPATIENT
Start: 2024-12-17

## 2024-12-16 RX ORDER — ZOLEDRONIC ACID 5 MG/100ML
5 INJECTION, SOLUTION INTRAVENOUS
Status: CANCELLED | OUTPATIENT
Start: 2024-12-17

## 2024-12-16 RX ORDER — HEPARIN 100 UNIT/ML
500 SYRINGE INTRAVENOUS
OUTPATIENT
Start: 2024-12-17

## 2024-12-16 NOTE — PROGRESS NOTES
CC: This 69 y.o. female presents for management of T2DM  and chronic conditions pending review including HTN, HLP    HPI: was diagnosed with T2DM in in . Her  was recently dx with stage 4 lung cancer.   Admitted in  for bs of 500.   Family hx of DM: mother, brother  Fhx of thyroid disease: none  Denies missing doses of DM medication.   hypoglycemia at home: none  monitoring BG at home:        Av  TIR: 90%  Low: 0%    She has a few pp excursions.    Diet:   3 meals daily.  No snacks. Avoids sugary beverages.     Exercise: none. Walks in the store daily.    CURRENT DM MEDS: Levemir 30 u qhs, Novolog 5 u>200, Ozempic 1 mg weekly.     Previous meds:  Metformin-dc at discharge in     Standards of Care:  Eye exam:  [facility]--didn't do DM exam  Podiatry exam: 3/24 me  DE: never    Dexa scan   Spine: -1.4  Lfn: -2.4  Fx: 14%  Hfx: 3.2%    DEXA scan:   Spine: -1.7  Lfn: -1.7  Fx: 9.5%  Hfx: 1.2%    No falls, fractures or steriod injections.  Not taking ca or vd.    Graves' Disease  +hair loss, fatigue, insomnia  No diarrhea, palpitations, sweating, anxiety  Nm uptake and scan 10/24 showed increased uptake at 24 hours.    PMHx, PSHx: reviewed in epic.  Social Hx: no E/T use. Former smoker who quit in .     Wt Readings from Last 10 Encounters:   24 73.8 kg (162 lb 11.2 oz)   24 76.9 kg (169 lb 8.5 oz)   24 77.4 kg (170 lb 10.2 oz)   24 75.8 kg (167 lb)   05/15/24 75.8 kg (167 lb)   24 77.5 kg (170 lb 13.7 oz)   24 75.8 kg (167 lb)   24 76.5 kg (168 lb 10.4 oz)   24 75.8 kg (167 lb)   24 76.2 kg (167 lb 15.9 oz)      ROS:   Gen: Appetite good, +wt loss 7 lbs, denies fatigue and weakness.  Skin: Skin is intact and heals well, no rashes, no hair changes  Eyes: Denies visual disturbances  Resp: no SOB or URBAN, no cough  Cardiac: No palpitations, chest pain, no edema   GI: No nausea or vomiting, diarrhea, constipation, or  "abdominal pain.  /GYN: No nocturia, burning or pain.   MS/Neuro: Denies numbness/ tingling in BLE; Gait steady, speech clear  Psych: Denies drug/ETOH abuse, no hx of depression.  Other systems: negative.    /70 (BP Location: Right arm, Patient Position: Sitting)   Pulse 93   Temp 98.5 °F (36.9 °C) (Oral)   Ht 5' 3" (1.6 m)   Wt 73.8 kg (162 lb 11.2 oz)   SpO2 98%   BMI 28.82 kg/m²      PE:  GENERAL: Well developed, well nourished.  PSYCH: AAOx3, appropriate mood and affect, pleasant expression, conversant, appears relaxed, well groomed.   EYES: Conjunctiva, corneas clear  NEURO: ambulates with a cane, Gait steady  SKIN: Skin warm and dry no acanthosis nigracans.  3/24  Foot Exam: no sores or macerations noted.     Protective Sensation (w/ 10 gram monofilament):  Right: Intact  Left: Intact    Visual Inspection:  Normal -  Bilateral, Nails Intact - without Evidence of Foot Deformity- Bilateral, and Callus -  Bilateral    Pedal Pulses:   Right: Present  Left: Present    Posterior Tibialis Pulses:   Right:Present  Left: Present     Vibratory Sensation  Right:Decreased  Left:Decreased    Personally reviewed labs below:    Lab Visit on 12/09/2024   Component Date Value Ref Range Status    Free T4 12/09/2024 0.94  0.71 - 1.51 ng/dL Final    TSH 12/09/2024 0.018 (L)  0.400 - 4.000 uIU/mL Final    C-Peptide 12/09/2024 8.75 (H)  0.78 - 5.19 ng/mL Final    Hemoglobin A1C 12/09/2024 5.8 (H)  4.0 - 5.6 % Final    Comment: ADA Screening Guidelines:  5.7-6.4%  Consistent with prediabetes  >or=6.5%  Consistent with diabetes    High levels of fetal hemoglobin interfere with the HbA1C  assay. Heterozygous hemoglobin variants (HbS, HgC, etc)do  not significantly interfere with this assay.   However, presence of multiple variants may affect accuracy.      Estimated Avg Glucose 12/09/2024 120  68 - 131 mg/dL Final    Sodium 12/09/2024 137  136 - 145 mmol/L Final    Potassium 12/09/2024 4.6  3.5 - 5.1 mmol/L Final    " Chloride 12/09/2024 102  95 - 110 mmol/L Final    CO2 12/09/2024 26  23 - 29 mmol/L Final    Glucose 12/09/2024 127 (H)  70 - 110 mg/dL Final    BUN 12/09/2024 25 (H)  8 - 23 mg/dL Final    Creatinine 12/09/2024 1.4  0.5 - 1.4 mg/dL Final    Calcium 12/09/2024 10.2  8.7 - 10.5 mg/dL Final    Total Protein 12/09/2024 7.9  6.0 - 8.4 g/dL Final    Albumin 12/09/2024 3.6  3.5 - 5.2 g/dL Final    Total Bilirubin 12/09/2024 0.4  0.1 - 1.0 mg/dL Final    Comment: For infants and newborns, interpretation of results should be based  on gestational age, weight and in agreement with clinical  observations.    Premature Infant recommended reference ranges:  Up to 24 hours.............<8.0 mg/dL  Up to 48 hours............<12.0 mg/dL  3-5 days..................<15.0 mg/dL  6-29 days.................<15.0 mg/dL      Alkaline Phosphatase 12/09/2024 54  40 - 150 U/L Final    AST 12/09/2024 12  10 - 40 U/L Final    ALT 12/09/2024 12  10 - 44 U/L Final    eGFR 12/09/2024 40.2 (A)  >60 mL/min/1.73 m^2 Final    Anion Gap 12/09/2024 9  8 - 16 mmol/L Final    Cholesterol 12/09/2024 165  120 - 199 mg/dL Final    Comment: The National Cholesterol Education Program (NCEP) has set the  following guidelines (reference ranges) for Cholesterol:  Optimal.....................<200 mg/dL  Borderline High.............200-239 mg/dL  High........................> or = 240 mg/dL      Triglycerides 12/09/2024 156 (H)  30 - 150 mg/dL Final    Comment: The National Cholesterol Education Program (NCEP) has set the  following guidelines (reference values) for triglycerides:  Normal......................<150 mg/dL  Borderline High.............150-199 mg/dL  High........................200-499 mg/dL      HDL 12/09/2024 47  40 - 75 mg/dL Final    Comment: The National Cholesterol Education Program (NCEP) has set the  following guidelines (reference values) for HDL Cholesterol:  Low...............<40 mg/dL  Optimal...........>60 mg/dL      LDL Cholesterol  12/09/2024 86.8  63.0 - 159.0 mg/dL Final    Comment: The National Cholesterol Education Program (NCEP) has set the  following guidelines (reference values) for LDL Cholesterol:  Optimal.......................<130 mg/dL  Borderline High...............130-159 mg/dL  High..........................160-189 mg/dL  Very High.....................>190 mg/dL      HDL/Cholesterol Ratio 12/09/2024 28.5  20.0 - 50.0 % Final    Total Cholesterol/HDL Ratio 12/09/2024 3.5  2.0 - 5.0 Final    Non-HDL Cholesterol 12/09/2024 118  mg/dL Final    Comment: Risk category and Non-HDL cholesterol goals:  Coronary heart disease (CHD)or equivalent (10-year risk of CHD >20%):  Non-HDL cholesterol goal     <130 mg/dL  Two or more CHD risk factors and 10-year risk of CHD <= 20%:  Non-HDL cholesterol goal     <160 mg/dL  0 to 1 CHD risk factor:  Non-HDL cholesterol goal     <190 mg/dL        NM THYROID UPTAKE AND SCAN     CLINICAL HISTORY:  Thyrotoxicosis, unspecified without thyrotoxic crisis or storm thyroid cancer;     TECHNIQUE:  362.8 uCi of I-123 was administered orally per protocol.     COMPARISON:  None.     FINDINGS:  There is heterogeneous radiotracer uptake throughout the thyroid gland, with no hot or cold nodules identified.     Thyroid radioiodine uptake at 6 hours is 13.1%, with uptake at 24 hours of 30.8%.     Impression:     Diffusely heterogeneous radioiodine uptake throughout the thyroid gland, with abnormally elevated 6 hour and 24 hour radioiodine uptake.        Electronically signed by:Dru Hernandez  Date:                                            10/23/2024     ASSESSMENT and PLAN:    1. Type 2 diabetes mellitus with hyperglycemia, with long-term current use of insulin  semaglutide (OZEMPIC) 2 mg/dose (8 mg/3 mL) PnIj    T4, Free    TSH    Hemoglobin A1C    Renal Function Panel      2. Hypertension associated with type 2 diabetes mellitus        3. Combined hyperlipidemia associated with type 2 diabetes mellitus        4.  Graves' disease        5. Postmenopausal        6. Hypovitaminosis D  Vitamin D      7. Osteopenia after menopause        8. High risk for hip fracture          T2DM with hyperglycemia-  A1c is below goal.    Medication Changes  Increase Ozempic to 2 mg weekly.     Decrease Toujeo to 25 u nightly    Novolog 5 u>200     Continue dexcom G7.  Discussed DM, progression of disease, long term complications, tx options.   Discussed A1c and BG goals.   Reviewed  hypoglycemia, s/s and appropriate tx.   Instructed to monitor BG and bring meter/ log to every clinic visit.   - takes ASA, ACEi, statin    HTN - controlled, continue meds as previously prescribed and monitor.     HLP - LDL , on statin therapy, LFTs WNL  Postmenopausal/osteopenia w/ high fx risk-start reclast. Start wt bearing and resistance exercises. Start 1500 mg of ca and 2000 IU of vd.  Graves' Disease-Not sx. Monitor. Declines medication.  Hypovitaminosis d-stable-check vitamin D  Overweight-Body mass index is 28.82 kg/m². Increase exercise to 30 min qd.     Follow-Up     F/u in 4 mths w/ labs prior - A1c, rp, tfts

## 2024-12-16 NOTE — PATIENT INSTRUCTIONS
Medication Changes  Increase Ozempic to 2 mg weekly.     Decrease Toujeo to 25 u nightly    Novolog 5 u>200

## 2025-01-14 DIAGNOSIS — Z00.00 ENCOUNTER FOR MEDICARE ANNUAL WELLNESS EXAM: ICD-10-CM

## 2025-01-17 DIAGNOSIS — I15.2 HYPERTENSION ASSOCIATED WITH TYPE 2 DIABETES MELLITUS: ICD-10-CM

## 2025-01-17 DIAGNOSIS — E11.59 HYPERTENSION ASSOCIATED WITH TYPE 2 DIABETES MELLITUS: ICD-10-CM

## 2025-01-17 RX ORDER — LISINOPRIL 10 MG/1
10 TABLET ORAL
Qty: 90 TABLET | Refills: 3 | OUTPATIENT
Start: 2025-01-17

## 2025-01-17 NOTE — TELEPHONE ENCOUNTER
Refill Decision Note   Celine Steen  is requesting a refill authorization.  Brief Assessment and Rationale for Refill:  Quick Discontinue     Medication Therapy Plan:  discontinued 5/15/24 by Dana Henriquez MD;reason: Alternate therapy      Comments:     Note composed:11:31 AM 01/17/2025

## 2025-01-17 NOTE — TELEPHONE ENCOUNTER
No care due was identified.  [facility] Embedded Care Due Messages. Reference number: [id number].   1/17/2025 3:55:13 AM CST

## 2025-01-24 ENCOUNTER — HOSPITAL ENCOUNTER (OUTPATIENT)
Dept: CARDIOLOGY | Facility: CLINIC | Age: 70
Discharge: HOME | End: 2025-01-24
Attending: INTERNAL MEDICINE
Payer: MEDICARE

## 2025-01-24 DIAGNOSIS — Z95.810 PRESENCE OF AUTOMATIC (IMPLANTABLE) CARDIAC DEFIBRILLATOR: ICD-10-CM

## 2025-01-24 DIAGNOSIS — I49.8 OTHER SPECIFIED CARDIAC ARRHYTHMIAS: ICD-10-CM

## 2025-01-24 PROCEDURE — 93295 DEV INTERROG REMOTE 1/2/MLT: CPT | Mod: HCNC,S$GLB,, | Performed by: INTERNAL MEDICINE

## 2025-01-24 PROCEDURE — 93296 REM INTERROG EVL PM/IDS: CPT | Mod: HCNC,PN | Performed by: INTERNAL MEDICINE

## 2025-02-04 ENCOUNTER — INFUSION (OUTPATIENT)
Dept: INFUSION THERAPY | Facility: HOSPITAL | Age: 70
End: 2025-02-04
Attending: PHYSICIAN ASSISTANT
Payer: MEDICARE

## 2025-02-04 VITALS
WEIGHT: 157.31 LBS | BODY MASS INDEX: 27.87 KG/M2 | HEART RATE: 84 BPM | SYSTOLIC BLOOD PRESSURE: 86 MMHG | DIASTOLIC BLOOD PRESSURE: 51 MMHG | OXYGEN SATURATION: 95 % | RESPIRATION RATE: 18 BRPM | TEMPERATURE: 97 F | HEIGHT: 63 IN

## 2025-02-04 DIAGNOSIS — Z78.0 OSTEOPENIA AFTER MENOPAUSE: ICD-10-CM

## 2025-02-04 DIAGNOSIS — M85.80 OSTEOPENIA AFTER MENOPAUSE: ICD-10-CM

## 2025-02-04 DIAGNOSIS — Z91.89 HIGH RISK FOR HIP FRACTURE: Primary | ICD-10-CM

## 2025-02-04 PROCEDURE — 63600175 PHARM REV CODE 636 W HCPCS: Performed by: PHYSICIAN ASSISTANT

## 2025-02-04 PROCEDURE — 96365 THER/PROPH/DIAG IV INF INIT: CPT

## 2025-02-04 RX ORDER — ZOLEDRONIC ACID 5 MG/100ML
5 INJECTION, SOLUTION INTRAVENOUS
OUTPATIENT
Start: 2025-02-04

## 2025-02-04 RX ORDER — SODIUM CHLORIDE 0.9 % (FLUSH) 0.9 %
10 SYRINGE (ML) INJECTION
Status: DISCONTINUED | OUTPATIENT
Start: 2025-02-04 | End: 2025-02-04 | Stop reason: HOSPADM

## 2025-02-04 RX ORDER — SODIUM CHLORIDE 0.9 % (FLUSH) 0.9 %
10 SYRINGE (ML) INJECTION
OUTPATIENT
Start: 2025-02-04

## 2025-02-04 RX ORDER — HEPARIN 100 UNIT/ML
500 SYRINGE INTRAVENOUS
OUTPATIENT
Start: 2025-02-04

## 2025-02-04 RX ORDER — ZOLEDRONIC ACID 5 MG/100ML
5 INJECTION, SOLUTION INTRAVENOUS
Status: COMPLETED | OUTPATIENT
Start: 2025-02-04 | End: 2025-02-04

## 2025-02-04 RX ADMIN — ZOLEDRONIC ACID 5 MG: 5 INJECTION, SOLUTION INTRAVENOUS at 03:02

## 2025-02-04 NOTE — PLAN OF CARE
Problem: Fatigue  Goal: Improved Activity Tolerance  Outcome: Progressing  Intervention: Promote Improved Energy  Flowsheets (Taken 2/4/2025 8271)  Fatigue Management:   fatigue-related activity identified   paced activity encouraged   frequent rest breaks encouraged  Sleep/Rest Enhancement:   noise level reduced   relaxation techniques promoted   regular sleep/rest pattern promoted  Activity Management:   Ambulated -L4   Up in chair - L3  Environmental Support:   calm environment promoted   distractions minimized   rest periods encouraged

## 2025-02-06 ENCOUNTER — OFFICE VISIT (OUTPATIENT)
Dept: FAMILY MEDICINE | Facility: CLINIC | Age: 70
End: 2025-02-06
Payer: MEDICARE

## 2025-02-06 VITALS
HEIGHT: 63 IN | HEART RATE: 90 BPM | DIASTOLIC BLOOD PRESSURE: 60 MMHG | BODY MASS INDEX: 28.21 KG/M2 | WEIGHT: 159.19 LBS | TEMPERATURE: 98 F | SYSTOLIC BLOOD PRESSURE: 100 MMHG | OXYGEN SATURATION: 100 %

## 2025-02-06 DIAGNOSIS — F31.9 BIPOLAR 1 DISORDER: ICD-10-CM

## 2025-02-06 DIAGNOSIS — N18.32 STAGE 3B CHRONIC KIDNEY DISEASE: ICD-10-CM

## 2025-02-06 DIAGNOSIS — F41.9 ANXIETY: Primary | ICD-10-CM

## 2025-02-06 DIAGNOSIS — Z95.810 ICD (IMPLANTABLE CARDIOVERTER-DEFIBRILLATOR) IN PLACE: ICD-10-CM

## 2025-02-06 DIAGNOSIS — I10 ESSENTIAL HYPERTENSION: Chronic | ICD-10-CM

## 2025-02-06 DIAGNOSIS — F10.21 HISTORY OF ALCOHOL DEPENDENCE: ICD-10-CM

## 2025-02-06 DIAGNOSIS — N18.31 TYPE 2 DIABETES MELLITUS WITH STAGE 3A CHRONIC KIDNEY DISEASE, WITHOUT LONG-TERM CURRENT USE OF INSULIN: ICD-10-CM

## 2025-02-06 DIAGNOSIS — E11.22 TYPE 2 DIABETES MELLITUS WITH STAGE 3A CHRONIC KIDNEY DISEASE, WITHOUT LONG-TERM CURRENT USE OF INSULIN: ICD-10-CM

## 2025-02-06 DIAGNOSIS — I25.10 CORONARY ARTERY DISEASE INVOLVING NATIVE CORONARY ARTERY OF NATIVE HEART WITHOUT ANGINA PECTORIS: Chronic | ICD-10-CM

## 2025-02-06 DIAGNOSIS — I50.32 CHRONIC DIASTOLIC CONGESTIVE HEART FAILURE: ICD-10-CM

## 2025-02-06 PROCEDURE — 1101F PT FALLS ASSESS-DOCD LE1/YR: CPT | Mod: HCNC,CPTII,S$GLB,

## 2025-02-06 PROCEDURE — 1160F RVW MEDS BY RX/DR IN RCRD: CPT | Mod: HCNC,CPTII,S$GLB,

## 2025-02-06 PROCEDURE — 99999 PR PBB SHADOW E&M-EST. PATIENT-LVL IV: CPT | Mod: PBBFAC,HCNC,,

## 2025-02-06 PROCEDURE — 3078F DIAST BP <80 MM HG: CPT | Mod: HCNC,CPTII,S$GLB,

## 2025-02-06 PROCEDURE — 99214 OFFICE O/P EST MOD 30 MIN: CPT | Mod: HCNC,S$GLB,,

## 2025-02-06 PROCEDURE — G2211 COMPLEX E/M VISIT ADD ON: HCPCS | Mod: HCNC,S$GLB,,

## 2025-02-06 PROCEDURE — 1126F AMNT PAIN NOTED NONE PRSNT: CPT | Mod: HCNC,CPTII,S$GLB,

## 2025-02-06 PROCEDURE — 3008F BODY MASS INDEX DOCD: CPT | Mod: HCNC,CPTII,S$GLB,

## 2025-02-06 PROCEDURE — 3074F SYST BP LT 130 MM HG: CPT | Mod: HCNC,CPTII,S$GLB,

## 2025-02-06 PROCEDURE — 1159F MED LIST DOCD IN RCRD: CPT | Mod: HCNC,CPTII,S$GLB,

## 2025-02-06 PROCEDURE — 3288F FALL RISK ASSESSMENT DOCD: CPT | Mod: HCNC,CPTII,S$GLB,

## 2025-02-06 RX ORDER — CLOPIDOGREL BISULFATE 75 MG/1
75 TABLET ORAL DAILY
Qty: 90 TABLET | Refills: 3 | Status: SHIPPED | OUTPATIENT
Start: 2025-02-06

## 2025-02-06 RX ORDER — HYDROXYZINE HYDROCHLORIDE 25 MG/1
25 TABLET, FILM COATED ORAL 3 TIMES DAILY PRN
Qty: 90 TABLET | Refills: 2 | Status: SHIPPED | OUTPATIENT
Start: 2025-02-06

## 2025-02-06 RX ORDER — ESCITALOPRAM OXALATE 10 MG/1
10 TABLET ORAL 2 TIMES DAILY
Qty: 180 TABLET | Refills: 3 | Status: SHIPPED | OUTPATIENT
Start: 2025-02-06

## 2025-02-06 NOTE — PROGRESS NOTES
Subjective:       Patient ID: Celine Steen is a 69 y.o. female.    Chief Complaint: 6 month follow up       Celine Steen is a 69 y.o. female with DM2, CAD, CHF, HTN, anxiety who presents to clinic for 6 month follow up. Labs updated in December with endocrinology. She states she is doing well overall. She states she has been under a lot of stress and has had an increase in anxiety due to her 's medical condition. She states he was recently placed on Hospice. She reports taking Lexapro 10 mg twice daily for her anxiety, which provides significant relief. She states she took Xanax several years ago as needed for anxiety, which helped. She is due for screening colonoscopy and diabetic eye exam.         Review of Systems   Respiratory:  Negative for shortness of breath.    Cardiovascular:  Negative for chest pain.   Gastrointestinal:  Negative for abdominal pain.   Neurological:  Negative for dizziness and headaches.   Psychiatric/Behavioral:  Negative for dysphoric mood. The patient is nervous/anxious.          Past Medical History:   Diagnosis Date    Anxiety     CAD (coronary artery disease)     CHF (congestive heart failure)     Chronic low back pain     Diabetes 2/1/2012    Diabetes mellitus     Hyperkalemia 2/13/2022    Hypertension     Leukocytosis 2/13/2022    Metabolic acidosis with respiratory acidosis 2/13/2022       Review of patient's allergies indicates:  No Known Allergies      Current Outpatient Medications:     aspirin (ECOTRIN) 81 MG EC tablet, Take 81 mg by mouth once daily., Disp: , Rfl:     blood sugar diagnostic (ACCU-CHEK GUIDE TEST STRIPS) Strp, TEST AS DIRECTED FOUR TIMES DAILY, Disp: 200 strip, Rfl: 11    coenzyme Q10 200 mg capsule, Take 200 mg by mouth 2 (two) times daily., Disp: , Rfl:     DEXCOM G7  Misc, Use to monitor glucose., Disp: 1 each, Rfl: 0    DEXCOM G7 SENSOR Katia, Change every 10 days., Disp: 3 each, Rfl: 11    furosemide (LASIX) 40 MG tablet, TAKE 1 TABLET(40  "MG) BY MOUTH TWICE DAILY, Disp: 180 tablet, Rfl: 3    insulin glargine, TOUJEO, (TOUJEO SOLOSTAR U-300 INSULIN) 300 unit/mL (1.5 mL) InPn pen, Inject 30 units nightly., Disp: 30 mL, Rfl: 3    lancets (TRUEPLUS LANCETS) 33 gauge Misc, TEST AS DIRECTED FOUR TIMES DAILY, Disp: 200 each, Rfl: 11    metoprolol succinate (TOPROL-XL) 50 MG 24 hr tablet, TAKE 2 TABLETS(100 MG) BY MOUTH EVERY DAY, Disp: 180 tablet, Rfl: 2    mirtazapine (REMERON) 7.5 MG Tab, TAKE 1 TABLET(7.5 MG) BY MOUTH EVERY EVENING, Disp: 30 tablet, Rfl: 11    nitroGLYCERIN (NITROSTAT) 0.3 MG SL tablet, DISSOLVE 1 TABLET UNDER THE TONGUE AS NEEDED FOR CHEST PAIN, Disp: 100 tablet, Rfl: 3    omega-3 fatty acids/fish oil (FISH OIL-OMEGA-3 FATTY ACIDS) 300-1,000 mg capsule, Take 1 capsule by mouth once daily., Disp: , Rfl:     ondansetron (ZOFRAN-ODT) 4 MG TbDL, Take 1 tablet (4 mg total) by mouth every 8 (eight) hours as needed (nauseau)., Disp: 30 tablet, Rfl: 0    ondansetron (ZOFRAN-ODT) 4 MG TbDL, Take 1 tablet (4 mg total) by mouth every 6 (six) hours as needed (nausea)., Disp: 1 tablet, Rfl: 0    pen needle, diabetic (BD VINCE 2ND GEN PEN NEEDLE) 32 gauge x 5/32" Ndle, USE AS DIRECTED THREE TIMES DAILY, Disp: 300 each, Rfl: 3    potassium citrate 99 mg Cap, Take 1 capsule by mouth once daily., Disp: , Rfl:     ranolazine (RANEXA) 500 MG Tb12, Take 1 tablet (500 mg total) by mouth 2 (two) times daily., Disp: 180 tablet, Rfl: 3    sacubitriL-valsartan (ENTRESTO) 24-26 mg per tablet, Take 1 tablet by mouth 2 (two) times daily., Disp: 180 tablet, Rfl: 1    semaglutide (OZEMPIC) 2 mg/dose (8 mg/3 mL) PnIj, Inject 2 mg into the skin every 7 days., Disp: 9 mL, Rfl: 3    simvastatin (ZOCOR) 40 MG tablet, Take 1 tablet (40 mg total) by mouth every evening., Disp: 90 tablet, Rfl: 3    azelastine (ASTELIN) 137 mcg (0.1 %) nasal spray, 1 spray (137 mcg total) by Nasal route 2 (two) times daily. for 7 days, Disp: 30 mL, Rfl: 0    blood-glucose meter kit, Use as " "instructed, Disp: 1 each, Rfl: 0    clopidogreL (PLAVIX) 75 mg tablet, Take 1 tablet (75 mg total) by mouth once daily., Disp: 90 tablet, Rfl: 3    EScitalopram oxalate (LEXAPRO) 10 MG tablet, Take 1 tablet (10 mg total) by mouth 2 (two) times a day., Disp: 180 tablet, Rfl: 3    hydrOXYzine HCL (ATARAX) 25 MG tablet, Take 1 tablet (25 mg total) by mouth 3 (three) times daily as needed for Anxiety (insomnia)., Disp: 90 tablet, Rfl: 2    Objective:        Physical Exam  Vitals reviewed.   Constitutional:       Appearance: Normal appearance.   HENT:      Head: Normocephalic and atraumatic.   Eyes:      Conjunctiva/sclera: Conjunctivae normal.   Cardiovascular:      Rate and Rhythm: Normal rate and regular rhythm.      Heart sounds: Normal heart sounds.   Pulmonary:      Effort: Pulmonary effort is normal.      Breath sounds: Normal breath sounds.   Musculoskeletal:      Cervical back: Normal range of motion.   Neurological:      Mental Status: She is alert and oriented to person, place, and time.   Psychiatric:         Behavior: Behavior normal.           Visit Vitals  /60 (BP Location: Right arm, Patient Position: Sitting)   Pulse 90   Temp 97.9 °F (36.6 °C) (Oral)   Ht 5' 3" (1.6 m)   Wt 72.2 kg (159 lb 2.8 oz)   SpO2 100%   BMI 28.20 kg/m²      Assessment:         1. Anxiety    2. Essential hypertension    3. Coronary artery disease involving native coronary artery of native heart without angina pectoris    4. Type 2 diabetes mellitus with stage 3a chronic kidney disease, without long-term current use of insulin    5. Stage 3b chronic kidney disease    6. History of alcohol dependence    7. Bipolar 1 disorder    8. Chronic diastolic congestive heart failure    9. ICD (implantable cardioverter-defibrillator) in place    10. BMI 28.0-28.9,adult        Plan:         Diagnoses and all orders for this visit:    Anxiety  -     EScitalopram oxalate (LEXAPRO) 10 MG tablet; Take 1 tablet (10 mg total) by mouth 2 (two) " times a day.  -     hydrOXYzine HCL (ATARAX) 25 MG tablet; Take 1 tablet (25 mg total) by mouth 3 (three) times daily as needed for Anxiety (insomnia).  -      She also takes Remeron as needed for insomnia.     Essential hypertension         -     Well controlled on Metoprolol. Continue to monitor.     Coronary artery disease involving native coronary artery of native heart without angina pectoris  -     clopidogreL (PLAVIX) 75 mg tablet; Take 1 tablet (75 mg total) by mouth once daily.         -     On ASA, Plavix and statin. Continue to monitor.    Type 2 diabetes mellitus with stage 3a chronic kidney disease, without long-term current use of insulin          -    Well controlled on Toujeo and Ozempic. Last A1c 5.8. Following with endocrinology.    Stage 3b chronic kidney disease           -    Stable on most recent CMP. Continue to monitor.    History of alcohol dependence           -    Stable, no relapse.    Bipolar 1 disorder            -    Stable on Lexapro and Remeron. Continue to monitor.    Chronic diastolic congestive heart failure              -    Stable on Entresto. Following with cardiology.     ICD (implantable cardioverter-defibrillator) in place          -    Stable. Following with cardiology.    BMI 28.0-28.9,adult           -   Pt has lost some weight on Ozempic 2 mg (about 10 lbs since August). Following with endocrinology.       Follow up in 6 months with PCP.         Family Medicine Physician Assistant     Future Appointments       Date Provider Specialty Appt Notes    4/14/2025  Lab 4 month lab    4/21/2025 MAGALI Pang PA-C Endocrinology 4 month dm /dexcom    8/8/2025 Natan Marie MD Family Medicine Annual    9/18/2025  Cardiology [facility]             Tests to Keep You Healthy    Mammogram: Met on 8/15/2024  Eye Exam: DUE  Colon Cancer Screening: ORDERED  Last Blood Pressure <= 139/89 (2/6/2025): Yes  Last HbA1c < 8 (12/09/2024): Yes       I spent a total of 30 minutes on the day  of the visit.This includes face to face time and non-face to face time preparing to see the patient (eg, review of tests), obtaining and/or reviewing separately obtained history, documenting clinical information in the electronic or other health record, independently interpreting results and communicating results to the patient/family/caregiver, or care coordinator.

## 2025-02-11 LAB
OHS CV DC REMOTE DEVICE TYPE: NORMAL
OHS CV DC REMOTE DEVICE TYPE: NORMAL
OHS CV RV PACING PERCENT: 1 %
OHS CV RV PACING PERCENT: 1 %

## 2025-03-18 ENCOUNTER — OFFICE VISIT (OUTPATIENT)
Dept: FAMILY MEDICINE | Facility: CLINIC | Age: 70
End: 2025-03-18
Payer: MEDICARE

## 2025-03-18 ENCOUNTER — TELEPHONE (OUTPATIENT)
Dept: FAMILY MEDICINE | Facility: CLINIC | Age: 70
End: 2025-03-18

## 2025-03-18 VITALS
TEMPERATURE: 99 F | BODY MASS INDEX: 28.56 KG/M2 | OXYGEN SATURATION: 95 % | HEIGHT: 63 IN | SYSTOLIC BLOOD PRESSURE: 110 MMHG | DIASTOLIC BLOOD PRESSURE: 68 MMHG | HEART RATE: 59 BPM | RESPIRATION RATE: 18 BRPM | WEIGHT: 161.19 LBS

## 2025-03-18 DIAGNOSIS — N18.31 TYPE 2 DIABETES MELLITUS WITH STAGE 3A CHRONIC KIDNEY DISEASE, WITHOUT LONG-TERM CURRENT USE OF INSULIN: ICD-10-CM

## 2025-03-18 DIAGNOSIS — E11.22 TYPE 2 DIABETES MELLITUS WITH STAGE 3A CHRONIC KIDNEY DISEASE, WITHOUT LONG-TERM CURRENT USE OF INSULIN: ICD-10-CM

## 2025-03-18 DIAGNOSIS — F41.9 ANXIETY: Primary | ICD-10-CM

## 2025-03-18 DIAGNOSIS — Z00.00 ENCOUNTER FOR PREVENTIVE HEALTH EXAMINATION: Primary | ICD-10-CM

## 2025-03-18 DIAGNOSIS — R26.9 ABNORMALITY OF GAIT AND MOBILITY: ICD-10-CM

## 2025-03-18 DIAGNOSIS — I15.2 HYPERTENSION ASSOCIATED WITH DIABETES: ICD-10-CM

## 2025-03-18 DIAGNOSIS — E11.59 HYPERTENSION ASSOCIATED WITH DIABETES: ICD-10-CM

## 2025-03-18 DIAGNOSIS — Z99.81 DEPENDENCE ON SUPPLEMENTAL OXYGEN: ICD-10-CM

## 2025-03-18 DIAGNOSIS — Z95.810 ICD (IMPLANTABLE CARDIOVERTER-DEFIBRILLATOR) IN PLACE: Chronic | ICD-10-CM

## 2025-03-18 PROCEDURE — 99999 PR PBB SHADOW E&M-EST. PATIENT-LVL V: CPT | Mod: PBBFAC,HCNC,, | Performed by: NURSE PRACTITIONER

## 2025-03-18 RX ORDER — FLUOXETINE 10 MG/1
10 CAPSULE ORAL DAILY
Qty: 30 CAPSULE | Refills: 11 | Status: SHIPPED | OUTPATIENT
Start: 2025-03-18 | End: 2026-03-18

## 2025-03-18 NOTE — TELEPHONE ENCOUNTER
Prozac sent to pharmacy. Stop Lexapro start Prozac next day for direct switch. Please let me know if any questions.

## 2025-03-18 NOTE — Clinical Note
Dread Tomlinson Ms. Steen was seen today for her AWV.  She was not able to have the lexapro prescribed filled due to insurance coverage.  She is asking if you would send in prozac for her.    Thank you, Micheline Bender NP

## 2025-03-18 NOTE — Clinical Note
Dread Reyes, I saw this patient today for AWV.  She was prescribed lexapro which the pharmacy is telling her isn't covered by her insurance,  She wonders if you would send in prozac for her instead?  Thank you, Micheline Bender, NP

## 2025-03-18 NOTE — PATIENT INSTRUCTIONS
Counseling and Referral of Other Preventative  (Italic type indicates deductible and co-insurance are waived)    Patient Name: Celine Steen  Today's Date: 3/18/2025    Health Maintenance       Date Due Completion Date    Diabetic Eye Exam Never done ---    TETANUS VACCINE Never done ---    RSV Vaccine (Age 60+ and Pregnant patients) (1 - Risk 60-74 years 1-dose series) Never done ---    Shingles Vaccine (2 of 2) 04/06/2020 2/10/2020    Colorectal Cancer Screening 03/06/2023 3/6/2013 (Done)    Override on 3/6/2013: Done (Done at [facility] in 2011)    Influenza Vaccine (1) 09/01/2024 12/11/2012    COVID-19 Vaccine (4 - 2024-25 season) 09/01/2024 1/11/2022    Foot Exam 03/01/2025 3/1/2024 (Done)    Override on 3/1/2024: Done    Hemoglobin A1c 06/09/2025 12/9/2024    Diabetes Urine Screening 07/03/2025 7/3/2024    Mammogram 08/15/2025 8/15/2024    Override on 1/20/2010: Done (legacy documents)    Lipid Panel 12/09/2025 12/9/2024    High Dose Statin 03/18/2026 3/18/2025    Aspirin/Antiplatelet Therapy 03/18/2026 3/18/2025    DEXA Scan 07/03/2027 7/3/2024        No orders of the defined types were placed in this encounter.    The following information is provided to all patients.  This information is to help you find resources for any of the problems found today that may be affecting your health:                  Living healthy guide: www.[facility].[place].gov      Understanding Diabetes: www.diabetes.org      Eating healthy: www.cdc.gov/healthyweight      CDC home safety checklist: www.cdc.gov/steadi/patient.html      Agency on Aging: www.goea.[place].gov      Alcoholics anonymous (AA): www.aa.org      Physical Activity: www.lara.nih.gov/ky1ynxt      Tobacco use: www.quitwithusla.org

## 2025-03-18 NOTE — PROGRESS NOTES
"  Celine Steen presented for a  Medicare AWV and comprehensive Health Risk Assessment today. The following components were reviewed and updated:    Medical history  Family History  Social history  Allergies and Current Medications  Health Risk Assessment  Health Maintenance  Care Team     Patient screened moderate and/or high risk for one or more social determinants of health (SDOH). Patient connected to community resources through the ED Navigator.      ** See Completed Assessments for Annual Wellness Visit within the encounter summary.**         The following assessments were completed:  Living Situation  CAGE  Depression Screening  Timed Get Up and Go  Whisper Test  Cognitive Function Screening  Nutrition Screening  ADL Screening  PAQ Screening    Clock in media   Opioid documentation:      Patient does not have a current opioid prescription.        Vitals:    03/18/25 1416   BP: 110/68   BP Location: Right arm   Patient Position: Sitting   Pulse: (!) 59   Resp: 18   Temp: 98.6 °F (37 °C)   TempSrc: Oral   SpO2: 95%   Weight: 73.1 kg (161 lb 2.5 oz)   Height: 5' 3" (1.6 m)     Body mass index is 28.55 kg/m².  Physical Exam  Constitutional:       Appearance: She is well-developed.   HENT:      Head: Normocephalic and atraumatic.      Nose: Nose normal.   Eyes:      General: Lids are normal.      Conjunctiva/sclera: Conjunctivae normal.   Cardiovascular:      Rate and Rhythm: Normal rate.   Pulmonary:      Effort: Pulmonary effort is normal.   Neurological:      Mental Status: She is alert and oriented to person, place, and time.   Psychiatric:         Speech: Speech normal.         Behavior: Behavior normal.               Diagnoses and health risks identified today and associated recommendations/orders:    1. Encounter for preventive health examination  Discussed health maintenance guidelines appropriate for age.        2. Type 2 diabetes mellitus with stage 3a chronic kidney disease, without long-term current " use of insulin  Controlled, continue current medication regimen  Last HgA1c -   ADA diet  Increase physical activity   Followed by       3. Hypertension associated with diabetes  Controlled, continue current medication regimen  Low salt diet  Increase physical activity  Followed by pcp      4. ICD (implantable cardioverter-defibrillator) in place  Stable, continue care and monitoring per cardiology     5. Dependence on supplemental oxygen  Continue prn use     6. Abnormality of gait and mobility  Stable ,continue use of assistive decvices       Provided Celine with a 5-10 year written screening schedule and personal prevention plan. Recommendations were developed using the USPSTF age appropriate recommendations. Education, counseling, and referrals were provided as needed. After Visit Summary printed and given to patient which includes a list of additional screenings\tests needed.    Follow up for One year for Annual Wellness Visit.    Micheline Bender NP      I offered to discuss advanced care planning, including how to pick a person who would make decisions for you if you were unable to make them for yourself, called a health care power of , and what kind of decisions you might make such as use of life sustaining treatments such as ventilators and tube feeding when faced with a life limiting illness recorded on a living will that they will need to know. (How you want to be cared for as you near the end of your natural life)     X Patient is interested in learning more about how to make advanced directives.  I provided them paperwork and offered to discuss this with them.

## 2025-04-10 DIAGNOSIS — Z79.4 TYPE 2 DIABETES MELLITUS WITH HYPERGLYCEMIA, WITH LONG-TERM CURRENT USE OF INSULIN: ICD-10-CM

## 2025-04-10 DIAGNOSIS — E11.65 TYPE 2 DIABETES MELLITUS WITH HYPERGLYCEMIA, WITH LONG-TERM CURRENT USE OF INSULIN: ICD-10-CM

## 2025-04-10 RX ORDER — BLOOD-GLUCOSE SENSOR
EACH MISCELLANEOUS
Qty: 3 EACH | Refills: 11 | Status: SHIPPED | OUTPATIENT
Start: 2025-04-10

## 2025-04-14 ENCOUNTER — LAB VISIT (OUTPATIENT)
Dept: LAB | Facility: HOSPITAL | Age: 70
End: 2025-04-14
Attending: PHYSICIAN ASSISTANT
Payer: MEDICARE

## 2025-04-14 DIAGNOSIS — Z79.4 TYPE 2 DIABETES MELLITUS WITH HYPERGLYCEMIA, WITH LONG-TERM CURRENT USE OF INSULIN: ICD-10-CM

## 2025-04-14 DIAGNOSIS — E55.9 HYPOVITAMINOSIS D: ICD-10-CM

## 2025-04-14 DIAGNOSIS — E11.65 TYPE 2 DIABETES MELLITUS WITH HYPERGLYCEMIA, WITH LONG-TERM CURRENT USE OF INSULIN: ICD-10-CM

## 2025-04-14 LAB
25(OH)D3+25(OH)D2 SERPL-MCNC: 31 NG/ML (ref 30–96)
ALBUMIN SERPL BCP-MCNC: 3.4 G/DL (ref 3.5–5.2)
ANION GAP (OHS): 14 MMOL/L (ref 8–16)
BUN SERPL-MCNC: 20 MG/DL (ref 8–23)
CALCIUM SERPL-MCNC: 10 MG/DL (ref 8.7–10.5)
CHLORIDE SERPL-SCNC: 102 MMOL/L (ref 95–110)
CO2 SERPL-SCNC: 23 MMOL/L (ref 23–29)
CREAT SERPL-MCNC: 1.1 MG/DL (ref 0.5–1.4)
EAG (OHS): 103 MG/DL (ref 68–131)
GFR SERPLBLD CREATININE-BSD FMLA CKD-EPI: 54 ML/MIN/1.73/M2
GLUCOSE SERPL-MCNC: 93 MG/DL (ref 70–110)
HBA1C MFR BLD: 5.2 % (ref 4–5.6)
PHOSPHATE SERPL-MCNC: 3.7 MG/DL (ref 2.7–4.5)
POTASSIUM SERPL-SCNC: 4.3 MMOL/L (ref 3.5–5.1)
SODIUM SERPL-SCNC: 139 MMOL/L (ref 136–145)
T4 FREE SERPL-MCNC: 1.22 NG/DL (ref 0.71–1.51)
TSH SERPL-ACNC: <0.01 UIU/ML (ref 0.4–4)

## 2025-04-14 PROCEDURE — 80069 RENAL FUNCTION PANEL: CPT | Mod: HCNC

## 2025-04-14 PROCEDURE — 84439 ASSAY OF FREE THYROXINE: CPT | Mod: HCNC

## 2025-04-14 PROCEDURE — 82306 VITAMIN D 25 HYDROXY: CPT | Mod: HCNC

## 2025-04-14 PROCEDURE — 83036 HEMOGLOBIN GLYCOSYLATED A1C: CPT | Mod: HCNC

## 2025-04-14 PROCEDURE — 84443 ASSAY THYROID STIM HORMONE: CPT | Mod: HCNC

## 2025-04-14 PROCEDURE — 36415 COLL VENOUS BLD VENIPUNCTURE: CPT | Mod: HCNC,PO

## 2025-04-21 RX ORDER — RANOLAZINE 500 MG/1
500 TABLET, EXTENDED RELEASE ORAL 2 TIMES DAILY
Qty: 180 TABLET | Refills: 3 | Status: SHIPPED | OUTPATIENT
Start: 2025-04-21 | End: 2026-04-21

## 2025-04-21 NOTE — TELEPHONE ENCOUNTER
----- Message from Kiet sent at 4/21/2025 11:45 AM CDT -----  Type:  RX Refill RequestWho Called:  pt Refill or New Rx:  new refillRX Name and Strength:   ranolazine (RANEXA) 500 MG [id number] is the patient currently taking it? (ex. 1XDay):  as directed Is this a 30 day or 90 day RX:  90Preferred Pharmacy with phone number:  [id number] - [place], [place] - [address] [facility] [address] [facility] [id number]  [place] [address]Hpsiu: [phone] Fax: [phone] or Mail Order:  localOrdering Provider:  seth Soler Call Back Number:  [phone] Information:  PT states that the pharm is having trouble getting in contact with dr for refill ranolazine (RANEXA) 500 MG Tb12. Out od dr. Please call back.  ----- Message -----  From: Kiet Lockwood  Sent: 4/21/2025  11:50 AM CDT  To: Seth Cortez Staff    Type:  RX Refill RequestWho Called:  pt Refill or New Rx:  new refillRX Name and Strength:   ranolazine (RANEXA) 500 MG [id number] is the patient currently taking it? (ex. 1XDay):  as directed Is this a 30 day or 90 day RX:  90Preferred Pharmacy with phone number:  [facility][id number] - [place], [place] - [address] [facility] [address] [facility] [address]  [place] [address]Ipdox: [phone] Fax: [phone] or Mail Order:  localOrdering Provider:  seth Soler Call Back Number:  [phone] Information:  PT states that the pharm is having trouble getting in contact with dr for refill ranolazine (RANEXA) 500 MG Tb12. Please call back.

## 2025-04-24 ENCOUNTER — TELEPHONE (OUTPATIENT)
Dept: FAMILY MEDICINE | Facility: CLINIC | Age: 70
End: 2025-04-24
Payer: MEDICARE

## 2025-04-24 NOTE — TELEPHONE ENCOUNTER
Spoke with patient   patient do if the Dexcom sensor Failed?  She put in a new one that one failed too.    Message sent to Diabetic education.  Please advise      States she finally received her Ozmepic 2 mg, She missed her dose last week.   Instructed to take it today   No

## 2025-04-24 NOTE — TELEPHONE ENCOUNTER
Called patient and provided the Dexcom customer support line to get replacements from the company for two failed sensors.  Provided my direct phone line if she has any issues.  She wishes to complete this at another time because her  is in hospice.      I cannot address the Ozempic 2 mg missed dose last week.

## 2025-04-24 NOTE — TELEPHONE ENCOUNTER
----- Message from Lucia sent at 4/24/2025  3:08 PM CDT -----  Type:  Needs Medical AdviceWho Called: self Symptoms (please be specific): pt is needing nurse to give her a call about her ozempic rx, and how she needs to take it etc. Please call pt to discuss. Would the patient rather a call back or a response via [facility]? [facility] Call Back Number: [phone] (home) Additional Information: please advise and thank you.

## 2025-05-02 ENCOUNTER — CLINICAL SUPPORT (OUTPATIENT)
Dept: CARDIOLOGY | Facility: CLINIC | Age: 70
End: 2025-05-02

## 2025-05-02 ENCOUNTER — HOSPITAL ENCOUNTER (OUTPATIENT)
Dept: CARDIOLOGY | Facility: CLINIC | Age: 70
Discharge: HOME | End: 2025-05-02
Attending: INTERNAL MEDICINE
Payer: MEDICARE

## 2025-05-02 DIAGNOSIS — I49.8 OTHER SPECIFIED CARDIAC ARRHYTHMIAS: ICD-10-CM

## 2025-05-02 DIAGNOSIS — Z95.810 PRESENCE OF AUTOMATIC (IMPLANTABLE) CARDIAC DEFIBRILLATOR: ICD-10-CM

## 2025-05-02 PROCEDURE — 93296 REM INTERROG EVL PM/IDS: CPT | Mod: HCNC,PN | Performed by: INTERNAL MEDICINE

## 2025-05-02 PROCEDURE — 93295 DEV INTERROG REMOTE 1/2/MLT: CPT | Mod: HCNC,S$GLB,, | Performed by: INTERNAL MEDICINE

## 2025-05-08 DIAGNOSIS — E11.69 HYPERLIPIDEMIA ASSOCIATED WITH TYPE 2 DIABETES MELLITUS: ICD-10-CM

## 2025-05-08 DIAGNOSIS — E78.5 HYPERLIPIDEMIA ASSOCIATED WITH TYPE 2 DIABETES MELLITUS: ICD-10-CM

## 2025-05-08 RX ORDER — SIMVASTATIN 40 MG/1
40 TABLET, FILM COATED ORAL NIGHTLY
Qty: 90 TABLET | Refills: 0 | Status: SHIPPED | OUTPATIENT
Start: 2025-05-08

## 2025-05-08 NOTE — TELEPHONE ENCOUNTER
Provider Staff:  Action required for this patient    Requires labs      Please see care gap opportunities below in Care Due Message.    Thanks!  [facility]     Appointments      Date Provider   Last Visit   8/6/2024 Natan Marie MD   Next Visit   8/8/2025 Natan Marie MD     Refill Decision Note   Celine Steen  is requesting a refill authorization.  Brief Assessment and Rationale for Refill:  Approve     Medication Therapy Plan:         Comments:     Note composed:10:46 AM 05/08/2025

## 2025-05-08 NOTE — TELEPHONE ENCOUNTER
Care Due:                  Date            Visit Type   Department     Provider  --------------------------------------------------------------------------------                                EP -                              PRIMARY      [facility]  Last Visit: 08-      CARE ([facility])   JAZIEL Marie                              EP -                              PRIMARY      [facility]  Next Visit: 08-      CARE ([facility])   JAZIEL Marie                                                            Last  Test          Frequency    Reason                     Performed    Due Date  --------------------------------------------------------------------------------    CBC.........  12 months..  mirtazapine..............  06- 05-    [facility] Embedded Care Due Messages. Reference number: [id number].   5/08/2025 8:39:11 AM CDT

## 2025-05-11 DIAGNOSIS — Z79.4 TYPE 2 DIABETES MELLITUS WITH HYPERGLYCEMIA, WITH LONG-TERM CURRENT USE OF INSULIN: Primary | ICD-10-CM

## 2025-05-11 DIAGNOSIS — E11.65 TYPE 2 DIABETES MELLITUS WITH HYPERGLYCEMIA, WITH LONG-TERM CURRENT USE OF INSULIN: Primary | ICD-10-CM

## 2025-05-12 RX ORDER — INSULIN GLARGINE 300 [IU]/ML
25 INJECTION, SOLUTION SUBCUTANEOUS NIGHTLY
Qty: 9 ML | Refills: 3 | Status: SHIPPED | OUTPATIENT
Start: 2025-05-12

## 2025-05-14 DIAGNOSIS — F51.01 PRIMARY INSOMNIA: ICD-10-CM

## 2025-05-14 RX ORDER — MIRTAZAPINE 7.5 MG/1
7.5 TABLET, FILM COATED ORAL NIGHTLY
Qty: 90 TABLET | Refills: 0 | Status: SHIPPED | OUTPATIENT
Start: 2025-05-14

## 2025-05-14 NOTE — TELEPHONE ENCOUNTER
No care due was identified.  [facility] Embedded Care Due Messages. Reference number: [id number].   5/14/2025 3:48:54 AM CDT

## 2025-05-14 NOTE — TELEPHONE ENCOUNTER
Refill Routing Note   Medication(s) are not appropriate for processing by [facility] for the following reason(s):        Drug-disease interaction: mirtazapine and Bipolar 1 disorder     [facility] action(s):  Defer             Pharmacist review requested: Yes     Appointments  past 12m or future 3m with PCP    Date Provider   Last Visit   8/6/2024 Natan Marie MD   Next Visit   8/8/2025 Natan Marie MD   ED visits in past 90 days: 0        Note composed:9:57 AM 05/14/2025

## 2025-05-14 NOTE — TELEPHONE ENCOUNTER
Refill Decision Note   Celine Steen  is requesting a refill authorization.  Brief Assessment and Rationale for Refill:  Approve     Medication Therapy Plan:         Pharmacist review requested: Yes   Extended chart review required: Yes   Comments:     Note composed:10:03 AM 05/14/2025

## 2025-05-15 ENCOUNTER — RESULTS FOLLOW-UP (OUTPATIENT)
Dept: ENDOCRINOLOGY | Facility: CLINIC | Age: 70
End: 2025-05-15
Payer: MEDICARE

## 2025-06-16 DIAGNOSIS — E05.00 GRAVES' DISEASE: Primary | ICD-10-CM

## 2025-06-16 RX ORDER — METHIMAZOLE 5 MG/1
5 TABLET ORAL DAILY
Qty: 30 TABLET | Refills: 11 | Status: SHIPPED | OUTPATIENT
Start: 2025-06-16 | End: 2026-06-16

## 2025-07-09 DIAGNOSIS — E11.9 TYPE 2 DIABETES MELLITUS WITHOUT COMPLICATION: ICD-10-CM

## 2025-07-13 DIAGNOSIS — I50.33 ACUTE ON CHRONIC DIASTOLIC CONGESTIVE HEART FAILURE: ICD-10-CM

## 2025-07-14 RX ORDER — FUROSEMIDE 40 MG/1
40 TABLET ORAL 2 TIMES DAILY
Qty: 180 TABLET | Refills: 3 | Status: SHIPPED | OUTPATIENT
Start: 2025-07-14

## 2025-07-17 DIAGNOSIS — E11.9 TYPE 2 DIABETES MELLITUS WITHOUT COMPLICATION, UNSPECIFIED WHETHER LONG TERM INSULIN USE: ICD-10-CM

## 2025-07-30 ENCOUNTER — LAB VISIT (OUTPATIENT)
Dept: LAB | Facility: HOSPITAL | Age: 70
End: 2025-07-30
Attending: PHYSICIAN ASSISTANT
Payer: MEDICARE

## 2025-07-30 DIAGNOSIS — E11.9 TYPE 2 DIABETES MELLITUS WITHOUT COMPLICATION: ICD-10-CM

## 2025-07-30 DIAGNOSIS — E05.00 GRAVES' DISEASE: ICD-10-CM

## 2025-07-30 LAB
ALBUMIN/CREAT UR: NORMAL
CREAT UR-MCNC: 34 MG/DL (ref 15–325)
MICROALBUMIN UR-MCNC: <5 UG/ML (ref ?–5000)
T4 FREE SERPL-MCNC: 0.79 NG/DL (ref 0.71–1.51)
TSH SERPL-ACNC: 1.38 UIU/ML (ref 0.4–4)

## 2025-07-30 PROCEDURE — 36415 COLL VENOUS BLD VENIPUNCTURE: CPT | Mod: HCNC,PO

## 2025-07-30 PROCEDURE — 84439 ASSAY OF FREE THYROXINE: CPT | Mod: HCNC

## 2025-07-30 PROCEDURE — 84443 ASSAY THYROID STIM HORMONE: CPT | Mod: HCNC

## 2025-07-30 PROCEDURE — 82570 ASSAY OF URINE CREATININE: CPT | Mod: HCNC

## 2025-08-01 ENCOUNTER — HOSPITAL ENCOUNTER (OUTPATIENT)
Dept: CARDIOLOGY | Facility: CLINIC | Age: 70
Discharge: HOME | End: 2025-08-01
Attending: INTERNAL MEDICINE
Payer: MEDICARE

## 2025-08-01 ENCOUNTER — CLINICAL SUPPORT (OUTPATIENT)
Dept: CARDIOLOGY | Facility: CLINIC | Age: 70
End: 2025-08-01

## 2025-08-01 ENCOUNTER — CLINICAL SUPPORT (OUTPATIENT)
Dept: CARDIOLOGY | Facility: CLINIC | Age: 70
End: 2025-08-01
Payer: MEDICARE

## 2025-08-01 DIAGNOSIS — Z95.810 PRESENCE OF AUTOMATIC (IMPLANTABLE) CARDIAC DEFIBRILLATOR: ICD-10-CM

## 2025-08-01 DIAGNOSIS — I49.8 OTHER SPECIFIED CARDIAC ARRHYTHMIAS: ICD-10-CM

## 2025-08-01 LAB
OHS CV DC REMOTE DEVICE TYPE: NORMAL
OHS CV RV PACING PERCENT: 1 %

## 2025-08-01 PROCEDURE — 93295 DEV INTERROG REMOTE 1/2/MLT: CPT | Mod: HCNC,S$GLB,, | Performed by: INTERNAL MEDICINE

## 2025-08-01 PROCEDURE — 93296 REM INTERROG EVL PM/IDS: CPT | Mod: HCNC,PN | Performed by: INTERNAL MEDICINE

## 2025-08-18 ENCOUNTER — HOSPITAL ENCOUNTER (OUTPATIENT)
Dept: RADIOLOGY | Facility: CLINIC | Age: 70
Discharge: HOME | End: 2025-08-18
Attending: FAMILY MEDICINE
Payer: MEDICARE

## 2025-08-18 ENCOUNTER — OFFICE VISIT (OUTPATIENT)
Dept: FAMILY MEDICINE | Facility: CLINIC | Age: 70
End: 2025-08-18
Payer: MEDICARE

## 2025-08-18 VITALS
HEART RATE: 80 BPM | TEMPERATURE: 98 F | BODY MASS INDEX: 27.73 KG/M2 | OXYGEN SATURATION: 96 % | WEIGHT: 156.5 LBS | HEIGHT: 63 IN | RESPIRATION RATE: 16 BRPM | DIASTOLIC BLOOD PRESSURE: 60 MMHG | SYSTOLIC BLOOD PRESSURE: 110 MMHG

## 2025-08-18 DIAGNOSIS — G89.29 CHRONIC PAIN OF RIGHT KNEE: ICD-10-CM

## 2025-08-18 DIAGNOSIS — R60.9 EDEMA, UNSPECIFIED TYPE: ICD-10-CM

## 2025-08-18 DIAGNOSIS — E11.22 TYPE 2 DIABETES MELLITUS WITH STAGE 3A CHRONIC KIDNEY DISEASE, WITHOUT LONG-TERM CURRENT USE OF INSULIN: ICD-10-CM

## 2025-08-18 DIAGNOSIS — M54.16 LUMBAR RADICULOPATHY: ICD-10-CM

## 2025-08-18 DIAGNOSIS — M25.561 CHRONIC PAIN OF RIGHT KNEE: ICD-10-CM

## 2025-08-18 DIAGNOSIS — M25.561 CHRONIC PAIN OF RIGHT KNEE: Primary | ICD-10-CM

## 2025-08-18 DIAGNOSIS — G89.29 CHRONIC PAIN OF RIGHT KNEE: Primary | ICD-10-CM

## 2025-08-18 DIAGNOSIS — M79.89 SWELLING OF LEFT LOWER EXTREMITY: ICD-10-CM

## 2025-08-18 DIAGNOSIS — N18.31 TYPE 2 DIABETES MELLITUS WITH STAGE 3A CHRONIC KIDNEY DISEASE, WITHOUT LONG-TERM CURRENT USE OF INSULIN: ICD-10-CM

## 2025-08-18 PROCEDURE — 3044F HG A1C LEVEL LT 7.0%: CPT | Mod: CPTII,HCNC,S$GLB, | Performed by: FAMILY MEDICINE

## 2025-08-18 PROCEDURE — 1159F MED LIST DOCD IN RCRD: CPT | Mod: CPTII,HCNC,S$GLB, | Performed by: FAMILY MEDICINE

## 2025-08-18 PROCEDURE — 3074F SYST BP LT 130 MM HG: CPT | Mod: CPTII,HCNC,S$GLB, | Performed by: FAMILY MEDICINE

## 2025-08-18 PROCEDURE — 73560 X-RAY EXAM OF KNEE 1 OR 2: CPT | Mod: 26,HCNC,RT, | Performed by: STUDENT IN AN ORGANIZED HEALTH CARE EDUCATION/TRAINING PROGRAM

## 2025-08-18 PROCEDURE — 99999 PR PBB SHADOW E&M-EST. PATIENT-LVL IV: CPT | Mod: PBBFAC,HCNC,, | Performed by: FAMILY MEDICINE

## 2025-08-18 PROCEDURE — G2211 COMPLEX E/M VISIT ADD ON: HCPCS | Mod: HCNC,S$GLB,, | Performed by: FAMILY MEDICINE

## 2025-08-18 PROCEDURE — 72100 X-RAY EXAM L-S SPINE 2/3 VWS: CPT | Mod: TC,HCNC,PO

## 2025-08-18 PROCEDURE — 3061F NEG MICROALBUMINURIA REV: CPT | Mod: CPTII,HCNC,S$GLB, | Performed by: FAMILY MEDICINE

## 2025-08-18 PROCEDURE — 3078F DIAST BP <80 MM HG: CPT | Mod: CPTII,HCNC,S$GLB, | Performed by: FAMILY MEDICINE

## 2025-08-18 PROCEDURE — 3288F FALL RISK ASSESSMENT DOCD: CPT | Mod: CPTII,HCNC,S$GLB, | Performed by: FAMILY MEDICINE

## 2025-08-18 PROCEDURE — 99214 OFFICE O/P EST MOD 30 MIN: CPT | Mod: HCNC,S$GLB,, | Performed by: FAMILY MEDICINE

## 2025-08-18 PROCEDURE — 3066F NEPHROPATHY DOC TX: CPT | Mod: CPTII,HCNC,S$GLB, | Performed by: FAMILY MEDICINE

## 2025-08-18 PROCEDURE — 73560 X-RAY EXAM OF KNEE 1 OR 2: CPT | Mod: TC,HCNC,PO,RT

## 2025-08-18 PROCEDURE — 1125F AMNT PAIN NOTED PAIN PRSNT: CPT | Mod: CPTII,HCNC,S$GLB, | Performed by: FAMILY MEDICINE

## 2025-08-18 PROCEDURE — 1101F PT FALLS ASSESS-DOCD LE1/YR: CPT | Mod: CPTII,HCNC,S$GLB, | Performed by: FAMILY MEDICINE

## 2025-08-18 PROCEDURE — 72100 X-RAY EXAM L-S SPINE 2/3 VWS: CPT | Mod: 26,HCNC,, | Performed by: STUDENT IN AN ORGANIZED HEALTH CARE EDUCATION/TRAINING PROGRAM

## 2025-08-18 PROCEDURE — 3008F BODY MASS INDEX DOCD: CPT | Mod: CPTII,HCNC,S$GLB, | Performed by: FAMILY MEDICINE

## 2025-08-18 RX ORDER — GABAPENTIN 100 MG/1
100 CAPSULE ORAL 2 TIMES DAILY
Qty: 60 CAPSULE | Refills: 11 | Status: SHIPPED | OUTPATIENT
Start: 2025-08-18 | End: 2026-08-18

## 2025-08-20 ENCOUNTER — TELEPHONE (OUTPATIENT)
Dept: FAMILY MEDICINE | Facility: CLINIC | Age: 70
End: 2025-08-20
Payer: MEDICARE

## 2025-08-20 ENCOUNTER — HOSPITAL ENCOUNTER (OUTPATIENT)
Dept: RADIOLOGY | Facility: HOSPITAL | Age: 70
Discharge: HOME | End: 2025-08-20
Attending: FAMILY MEDICINE
Payer: MEDICARE

## 2025-08-20 DIAGNOSIS — M79.89 SWELLING OF LEFT LOWER EXTREMITY: ICD-10-CM

## 2025-08-20 DIAGNOSIS — R60.9 EDEMA, UNSPECIFIED TYPE: ICD-10-CM

## 2025-08-20 PROCEDURE — 93971 EXTREMITY STUDY: CPT | Mod: 26,LT,, | Performed by: RADIOLOGY

## 2025-08-20 PROCEDURE — 93971 EXTREMITY STUDY: CPT | Mod: TC,PO,LT

## 2025-09-02 LAB
OHS CV DC REMOTE DEVICE TYPE: NORMAL
OHS CV RV PACING PERCENT: 1 %

## (undated) DEVICE — SHEATH INTRODUCER 6FR 11CM

## (undated) DEVICE — CATH DXTERITY JR40 100CM 6FR

## (undated) DEVICE — SUT CTD VICRYL CT-1 UND BR

## (undated) DEVICE — KIT ACCESS NDL ECHOGENIC 6FR

## (undated) DEVICE — CATH JL3.5 5FR

## (undated) DEVICE — SUT SILK 0 PSL 30 INCH

## (undated) DEVICE — Device

## (undated) DEVICE — KIT WRENCH

## (undated) DEVICE — CATH MARKER PIGTAIL 5FRX110CM

## (undated) DEVICE — SUT 4.0 VICRYL

## (undated) DEVICE — GUIDEWIRE AMPLATZ .035X260

## (undated) DEVICE — GUIDEWIRE INQWIRE SE 3MM JTIP

## (undated) DEVICE — DEVICE PLASMABLADE X 3.0S LT

## (undated) DEVICE — CATH IMPULSE 5F 100CM FR4

## (undated) DEVICE — GUIDEWIRE V-18 CONTROL .18

## (undated) DEVICE — SHEATH PINNACLE 6FR 25CM

## (undated) DEVICE — SUT VICRYL PLUS 4-0 P3 18IN

## (undated) DEVICE — SHEATH INTRODUCER 5FR 10CM

## (undated) DEVICE — CATH DXTERITY IMA 100CM 6FR

## (undated) DEVICE — WIRE GUIDE .035 260CML